# Patient Record
Sex: MALE | Race: WHITE | NOT HISPANIC OR LATINO | Employment: OTHER | URBAN - METROPOLITAN AREA
[De-identification: names, ages, dates, MRNs, and addresses within clinical notes are randomized per-mention and may not be internally consistent; named-entity substitution may affect disease eponyms.]

---

## 2023-08-30 ENCOUNTER — HOSPITAL ENCOUNTER (OUTPATIENT)
Dept: DATA CONVERSION | Facility: HOSPITAL | Age: 58
Discharge: HOME | End: 2023-08-30
Payer: MEDICARE

## 2023-08-30 DIAGNOSIS — E80.0: ICD-10-CM

## 2023-08-30 LAB
ALBUMIN SERPL-MCNC: 4.4 GM/DL (ref 3.5–5)
ALBUMIN/GLOB SERPL: 1.2 RATIO (ref 1.5–3)
ALP BLD-CCNC: 203 U/L (ref 35–125)
ALT SERPL-CCNC: 15 U/L (ref 5–40)
AST SERPL-CCNC: 23 U/L (ref 5–40)
BILIRUB DIRECT SERPL-MCNC: 0.5 MG/DL (ref 0–0.2)
BILIRUB INDIRECT SERPL-MCNC: 0.9 MG/DL (ref 0–0.8)
BILIRUB SERPL-MCNC: 1.4 MG/DL (ref 0.1–1.2)
GLOBULIN SER-MCNC: 3.7 G/DL (ref 1.9–3.7)
PROT SERPL-MCNC: 8.1 G/DL (ref 5.9–7.9)

## 2023-09-22 ENCOUNTER — HOSPITAL ENCOUNTER (OUTPATIENT)
Dept: DATA CONVERSION | Facility: HOSPITAL | Age: 58
Discharge: HOME | End: 2023-09-22
Payer: MEDICARE

## 2023-09-22 DIAGNOSIS — R17 UNSPECIFIED JAUNDICE: ICD-10-CM

## 2023-09-22 DIAGNOSIS — R74.8 ABNORMAL LEVELS OF OTHER SERUM ENZYMES: ICD-10-CM

## 2023-10-10 ENCOUNTER — LAB (OUTPATIENT)
Dept: LAB | Facility: LAB | Age: 58
End: 2023-10-10
Payer: MEDICARE

## 2023-10-10 DIAGNOSIS — R17 UNSPECIFIED JAUNDICE: ICD-10-CM

## 2023-10-10 DIAGNOSIS — R79.89 OTHER SPECIFIED ABNORMAL FINDINGS OF BLOOD CHEMISTRY: Primary | ICD-10-CM

## 2023-10-10 LAB
HGB RETIC QN: 34 PG (ref 28–38)
IMMATURE RETIC FRACTION: 11.1 %
LDH SERPL-CCNC: 247 U/L (ref 91–227)
RETICS #: 0.09 X10*6/UL (ref 0.02–0.12)
RETICS/RBC NFR AUTO: 2 % (ref 0.5–2)

## 2023-10-10 PROCEDURE — 82105 ALPHA-FETOPROTEIN SERUM: CPT | Mod: WAIVER OF LIABILITY ON FILE

## 2023-10-10 PROCEDURE — 36415 COLL VENOUS BLD VENIPUNCTURE: CPT

## 2023-10-10 PROCEDURE — 86235 NUCLEAR ANTIGEN ANTIBODY: CPT

## 2023-10-10 PROCEDURE — 86706 HEP B SURFACE ANTIBODY: CPT

## 2023-10-10 PROCEDURE — 80074 ACUTE HEPATITIS PANEL: CPT | Mod: WAIVER OF LIABILITY ON FILE

## 2023-10-10 PROCEDURE — 86381 MITOCHONDRIAL ANTIBODY EACH: CPT

## 2023-10-10 PROCEDURE — 82390 ASSAY OF CERULOPLASMIN: CPT

## 2023-10-10 PROCEDURE — 86225 DNA ANTIBODY NATIVE: CPT

## 2023-10-10 PROCEDURE — 82103 ALPHA-1-ANTITRYPSIN TOTAL: CPT

## 2023-10-10 PROCEDURE — 83010 ASSAY OF HAPTOGLOBIN QUANT: CPT

## 2023-10-10 PROCEDURE — 86038 ANTINUCLEAR ANTIBODIES: CPT

## 2023-10-10 PROCEDURE — 86015 ACTIN ANTIBODY EACH: CPT

## 2023-10-10 PROCEDURE — 83615 LACTATE (LD) (LDH) ENZYME: CPT

## 2023-10-10 PROCEDURE — 85045 AUTOMATED RETICULOCYTE COUNT: CPT

## 2023-10-11 LAB
A1AT SERPL NEPH-MCNC: 212 MG/DL (ref 84–218)
AFP SERPL-MCNC: <4 NG/ML (ref 0–9)
ANA PATTERN: ABNORMAL
ANA SER QL HEP2 SUBST: POSITIVE
ANA TITR SER IF: ABNORMAL {TITER}
CENTROMERE B AB SER-ACNC: <0.2 AI
CERULOPLASMIN SERPL-MCNC: 51.6 MG/DL (ref 20–60)
CHROMATIN AB SERPL-ACNC: <0.2 AI
DSDNA AB SER-ACNC: <1 IU/ML
ENA JO1 AB SER QL IA: <0.2 AI
ENA RNP AB SER IA-ACNC: <0.2 AI
ENA SCL70 AB SER QL IA: <0.2 AI
ENA SM AB SER IA-ACNC: <0.2 AI
ENA SM+RNP AB SER QL IA: <0.2 AI
ENA SS-A AB SER IA-ACNC: <0.2 AI
ENA SS-B AB SER IA-ACNC: <0.2 AI
HAPTOGLOB SERPL NEPH-MCNC: 210 MG/DL (ref 30–200)
HAV IGM SER QL: NONREACTIVE
HBV CORE IGM SER QL: NONREACTIVE
HBV SURFACE AB SER-ACNC: 20.8 MIU/ML
HBV SURFACE AG SERPL QL IA: NONREACTIVE
HCV AB SER QL: NONREACTIVE
MITOCHONDRIA AB SER QL IF: NEGATIVE
RIBOSOMAL P AB SER-ACNC: 0.2 AI
SMOOTH MUSCLE AB SER QL IF: NEGATIVE

## 2023-10-12 DIAGNOSIS — Z76.0 MEDICATION REFILL: ICD-10-CM

## 2023-10-12 RX ORDER — PAROXETINE 10 MG/1
10 TABLET, FILM COATED ORAL
Qty: 90 TABLET | Refills: 3 | Status: SHIPPED | OUTPATIENT
Start: 2023-10-12

## 2023-10-13 ENCOUNTER — HOSPITAL ENCOUNTER (OUTPATIENT)
Dept: RADIOLOGY | Facility: EXTERNAL LOCATION | Age: 58
Discharge: HOME | End: 2023-10-13
Payer: MEDICARE

## 2023-10-13 DIAGNOSIS — M25.522 LEFT ELBOW PAIN: ICD-10-CM

## 2023-10-18 ENCOUNTER — TELEPHONE (OUTPATIENT)
Dept: PRIMARY CARE | Facility: CLINIC | Age: 58
End: 2023-10-18
Payer: MEDICARE

## 2023-10-18 NOTE — TELEPHONE ENCOUNTER
It looks like pt has positive Hep B antibodies. This could be from Hep B vaccination or past exposure to Hep B.

## 2023-10-18 NOTE — TELEPHONE ENCOUNTER
Pt's wife left message stating that they are concerned about recent lab results from the GI doctor the pt was referred to. Pt's wife stated that they were told the pt tested positive for hep B. Pt's wife stated they have called the GI office multiple times but cannot get a hold of anyone. Pt wife also stated that it looks like it says the pt has hep B antibodies from the MyChart results. The pt wife is requesting clarification from our office if we have access to these results.

## 2023-10-20 ENCOUNTER — LAB (OUTPATIENT)
Dept: LAB | Facility: LAB | Age: 58
End: 2023-10-20
Payer: MEDICARE

## 2023-10-20 DIAGNOSIS — R79.89 OTHER SPECIFIED ABNORMAL FINDINGS OF BLOOD CHEMISTRY: Primary | ICD-10-CM

## 2023-10-20 LAB — GGT SERPL-CCNC: 188 U/L (ref 15–85)

## 2023-10-20 PROCEDURE — 82977 ASSAY OF GGT: CPT | Mod: WAIVER OF LIABILITY ON FILE

## 2023-10-20 PROCEDURE — 86803 HEPATITIS C AB TEST: CPT

## 2023-10-20 PROCEDURE — 84080 ASSAY ALKALINE PHOSPHATASES: CPT

## 2023-10-20 PROCEDURE — 84078 ASSAY ALKALINE PHOSPHATASE: CPT

## 2023-10-20 PROCEDURE — 36415 COLL VENOUS BLD VENIPUNCTURE: CPT

## 2023-10-21 LAB — HCV AB SER QL: NONREACTIVE

## 2023-10-23 LAB — ALP BONE SERPL-MCNC: 15.4 UG/L (ref 6.5–20.1)

## 2023-10-24 LAB
ALP BONE SERPL-CCNC: 40 U/L (ref 0–55)
ALP LIVER SERPL-CCNC: 168 U/L (ref 0–94)
ALP OTHER SERPL-CCNC: 0 U/L
ALP SERPL-CCNC: 208 U/L (ref 40–120)

## 2023-10-30 ENCOUNTER — TELEPHONE (OUTPATIENT)
Dept: GASTROENTEROLOGY | Facility: HOSPITAL | Age: 58
End: 2023-10-30

## 2023-10-30 DIAGNOSIS — K76.0 NON-ALCOHOLIC FATTY LIVER DISEASE: Primary | ICD-10-CM

## 2023-10-31 ENCOUNTER — TELEPHONE (OUTPATIENT)
Dept: PRIMARY CARE | Facility: CLINIC | Age: 58
End: 2023-10-31
Payer: MEDICARE

## 2023-10-31 DIAGNOSIS — M70.30 BURSITIS OF ELBOW, UNSPECIFIED BURSA, UNSPECIFIED LATERALITY: Primary | ICD-10-CM

## 2023-11-01 NOTE — TELEPHONE ENCOUNTER
Pt wife was callled and states she wants to know what you think she should do because uc said to drain and ortho said no. Would you like a uc follow up?

## 2023-11-01 NOTE — TELEPHONE ENCOUNTER
ORTHO would be the experts in this, not urgent care. If she would like a second opinion from a different ORTHO specialist, I can place the referral.

## 2023-11-01 NOTE — TELEPHONE ENCOUNTER
Spoke with wife - he went to  yesterday and afterwards was able to be see by Dr. Christie Mcmillan at [facility] ortho in [place]. Stated they will not drain them due to the risk of infection. Wife stated it was already previously infected and treated with abx. She wanted to know if you knew any ortho that would drain it for him? If so, they need a referral.

## 2023-11-01 NOTE — TELEPHONE ENCOUNTER
Spoke with patients wife. She stated patient went to  and was told he has bursitis of the elbow. Wife stated it is a sac that needs to be drained (can feel the fluid), but they cannot find anyone to drain it. They find places that say they will then they get there and tell them they dont do that. They want dr aguirre opinion on if you know somewhere they can go or if it just needs to be left alone?

## 2023-11-02 NOTE — TELEPHONE ENCOUNTER
Patients wife made aware. Stated they will hold off on the referral for now and will call back if they change their mind.

## 2023-11-20 ENCOUNTER — APPOINTMENT (OUTPATIENT)
Dept: PRIMARY CARE | Facility: CLINIC | Age: 58
End: 2023-11-20
Payer: MEDICARE

## 2023-11-25 ENCOUNTER — HOSPITAL ENCOUNTER (OUTPATIENT)
Dept: RADIOLOGY | Facility: EXTERNAL LOCATION | Age: 58
Discharge: HOME | End: 2023-11-25

## 2023-11-25 DIAGNOSIS — R05.1 ACUTE COUGH: ICD-10-CM

## 2023-12-25 NOTE — PROGRESS NOTES
"Hepatology Clinic Consult Note    Reason For Consult  Elevated alk phos, hyperbilirubinemia, nodular liver on US    History Of Present Illness  Carl Vazquez is a 58 y.o. male with a past medical history of CAD s/p quadruple bypass 2014 and PCI x2 2020, hypothyroidism, Hodgkin's diease (age 16) s/p chemo/radiation s/p splenectomy, bioprosthetic aortic valve replacement 2014, on chronic asa/plavix, DM, and HLD who presents for follow up of Elevated alk phos, hyperbilirubinemia, nodular liver on US    Has known he has an elevated alk phos for the last 20-25 years. Bilirubin is newly elevated this year. Has not noticed any yellowing of skin or eyes.    Saw a GI provider 3-4 times in the office at [facility] (Khoi Sosa). Got a fibroscan and was told there is quite a bit of liver disease in the liver, though he is not sure if it is \"fatty liver\" are \"scarring in the liver\". I am unable to see this report.     He has a long history of cardiac disease and follows with a cardiologist in [facility]. He had a quadruple bypass in 2014 then required PCI x2 in 2020. He had an aortic valve replacement (bioprosthetic) in 2014 as well. Last Echo reports EF 50-55% and moderate pulmonary hypertension with well-functioning bioprosthetic valve. Is on torsemide for leg swelling which he was told is due to his heart. Has been on this since 2014. Fluid retention usually happens in left leg for the last 10 years.    Regarding his Hodgkin's lymphoma dx at age 16, he did get radiation from the waist up. Got about 20 sessions in the chest, 30 sessions in the abdomen.    Used to be 210 now 175 lbs. Has made some dietary changes and is more active. Reports has been fairly stable at 175. May have had some additional weight loss in the last 6 months    Social History:  -Drugs: none  -Alcohol - social drinker on weekends 4 beers and Wednesdays 2 beers 12oz.  -Tobacco: denies    Surgery:  Thoracotomy with decortication 2018  LN neck " biopsy  Splenectomy  Tumor debulking in the abdomen  Tonsillectomy       Past Medical History  CAD s/p quadruple bypass 2014 and PCI x2 2020, hypothyroidism, Hodgkin's diease (age 16) s/p chemo/radiation s/p splenectomy, bioprosthetic aortic valve replacement 2014, on chronic asa/plavix, DM, and HLD    Surgical History  Past Surgical History:   Procedure Laterality Date    CT GUIDED CHEST TUBE PLACEMENT  7/25/2018    CT GUIDED CHEST TUBE PLACEMENT [facility]     Family History  No family history of liver disease in the family. No family history of colon cancer. Has an identical twin brother who is healthy       Allergies  Allergies   Allergen Reactions    Phenobarbital Rash       Home Medications    Current Outpatient Medications:     metFORMIN, OSM, (Fortamet) 500 mg 24 hr tablet, Take 1 tablet (500 mg) by mouth once daily in the evening. Take with meals. Do not crush, chew, or split., Disp: , Rfl:     PARoxetine (Paxil) 10 mg tablet, TAKE 1 TABLET DAILY IN THE MORNING (Patient taking differently: Take 0.5 tablets (5 mg) by mouth once daily in the morning. Take before meals.), Disp: 90 tablet, Rfl: 3    aspirin 81 mg EC tablet, Take 1 tablet (81 mg) by mouth once daily., Disp: , Rfl:     atorvastatin (Lipitor) 20 mg tablet, Take 1 tablet (20 mg) by mouth once daily., Disp: , Rfl:     clopidogrel (Plavix) 75 mg tablet, Take 1 tablet (75 mg) by mouth once daily., Disp: , Rfl:     levothyroxine (Synthroid, Levoxyl) 25 mcg tablet, Take 1 tablet (25 mcg) by mouth once daily in the morning. Take before meals., Disp: , Rfl:     lisinopril 5 mg tablet, Take 1 tablet (5 mg) by mouth once daily., Disp: , Rfl:     metoprolol succinate XL (Toprol-XL) 25 mg 24 hr tablet, Take 1 tablet (25 mg) by mouth 2 times a day. Do not crush or chew., Disp: , Rfl:     pantoprazole (ProtoNix) 40 mg EC tablet, Take 1 tablet (40 mg) by mouth once daily in the morning. Take before meals. Do not crush, chew, or split., Disp: , Rfl:      torsemide (Demadex) 100 mg tablet, Take 1 tablet (100 mg) by mouth once daily., Disp: , Rfl:     TORSEMIDE ORAL, Take 50 mg by mouth once daily., Disp: , Rfl:     Review of Systems  Constitutional/ General: No fever, + weight loss  Eyes: no yellow discoloration  ENT: normal   Cardiovascular: no chest pain, no palpitations  Respiratory: no shortness of breath  Gastrointestinal: denies abdominal pain, nausea, vomiting  Integumentary: no rashes, no yellow discoloration of skin  Neurologic: no weakness or numbness of extremities  Psychiatric: no mood fluctuations  Musculoskeletal: no joint swelling   Genitourinary: no dysuria, no hematuria    All other systems have been reviewed and are negative except as noted in the HPI and above.      Physical Exam  General: well-nourished, no acute distress  HEENT: PERRLA, EOM intact, no scleral icterus, moist MM  Respiratory: CTA bilaterally, normal work of breathing  Cardiovascular: RRR, no murmurs/rubs/gallops  Abdomen: Soft, nontender, nondistended, bowel sounds present, no masses palpated, no organomeagly  Extremities: no edema, no asterixis  Neuro: alert and oriented, CNII-XII grossly intact, moves all 4 extremities with no focal deficits     Last Recorded Vitals  Blood pressure 129/78, pulse 73, temperature 36.4 °C (97.6 °F).      Relevant Results    Current Outpatient Medications:     metFORMIN, OSM, (Fortamet) 500 mg 24 hr tablet, Take 1 tablet (500 mg) by mouth once daily in the evening. Take with meals. Do not crush, chew, or split., Disp: , Rfl:     PARoxetine (Paxil) 10 mg tablet, TAKE 1 TABLET DAILY IN THE MORNING (Patient taking differently: Take 0.5 tablets (5 mg) by mouth once daily in the morning. Take before meals.), Disp: 90 tablet, Rfl: 3    aspirin 81 mg EC tablet, Take 1 tablet (81 mg) by mouth once daily., Disp: , Rfl:     atorvastatin (Lipitor) 20 mg tablet, Take 1 tablet (20 mg) by mouth once daily., Disp: , Rfl:     clopidogrel (Plavix) 75 mg tablet, Take  1 tablet (75 mg) by mouth once daily., Disp: , Rfl:     levothyroxine (Synthroid, Levoxyl) 25 mcg tablet, Take 1 tablet (25 mcg) by mouth once daily in the morning. Take before meals., Disp: , Rfl:     lisinopril 5 mg tablet, Take 1 tablet (5 mg) by mouth once daily., Disp: , Rfl:     metoprolol succinate XL (Toprol-XL) 25 mg 24 hr tablet, Take 1 tablet (25 mg) by mouth 2 times a day. Do not crush or chew., Disp: , Rfl:     pantoprazole (ProtoNix) 40 mg EC tablet, Take 1 tablet (40 mg) by mouth once daily in the morning. Take before meals. Do not crush, chew, or split., Disp: , Rfl:     torsemide (Demadex) 100 mg tablet, Take 1 tablet (100 mg) by mouth once daily., Disp: , Rfl:     TORSEMIDE ORAL, Take 50 mg by mouth once daily., Disp: , Rfl:      Lab Results   Component Value Date    WBC 8.4 2023    HGB 14.0 2023    HCT 42.5 2023    MCV 95.9 2023     2023     Lab Results   Component Value Date    GLUCOSE 100 (H) 2023    CALCIUM 9.7 2023     2023    K 4.4 2023    CO2 30 2023    CL 96 (L) 2023    BUN 28 (H) 2023    CREATININE 1.2 2023     Lab Results   Component Value Date    ALT 15 2023    AST 23 2023     (H) 10/20/2023    ALKPHOS 208 (H) 10/20/2023    BILITOT 1.4 (H) 2023     AFP <4  HBV surface Ag nonreactive  HAV IgM nonreactive  HBV core IgM nonreactive  HBV surface Ab 20.8  HCV nonreactive  Ceruloplasmin 51.6  AMANDA 1:320, homogeneous  AMA negative  A1AT 212  ASMA negative  NICK panel negative    Imagin/2023 US abdomen  IMPRESSION:  Slight nonspecific lobulation of the hepatic contour, without focal lesions;  correlate clinically to exclude cirrhosis. Right renal cyst. No gallstones  or biliary duct dilatation.    2018 CT AP  IMPRESSION: Massive diffuse small bowel wall thickening involving all or  most of the ileum, worse compared to 7/15/2018. Large amount of ascites  present is also mildly  worse. The findings are of uncertain etiology and  again suggest a nonspecific enteritis which may be inflammatory or  infectious in nature. Ischemia is not excluded on the basis of this exam.     Continued large right pleural effusion with prominent parenchymal density at  the right base similar to prior study. This may relate to aspiration  pneumonia versus partial lung collapse. Neoplasm not excluded.       GI Procedures:    7/2018 EGD  Findings:       The esophagus and gastroesophageal junction were examined with white        light from a forward view and retroflexed position. There were        esophageal mucosal changes suspicious for short-segment Barboza's        esophagus. These changes involved the mucosa at the upper extent of the        gastric folds (39 cm from the incisors) extending to the Z-line (37 cm        from the incisors). Two tongues of salmon-colored mucosa were present at        37 cm. The maximum longitudinal extent of these esophageal mucosal        changes was 2 cm in length. Mucosa was biopsied with a cold forceps for        histology in a targeted manner at intervals of 1 cm in the lower third        of the esophagus. One specimen bottle was sent to pathology.        Verification of patient identification for the specimen was done by the        nurse.       Food was found on the greater curvature of the stomach.       The ampulla, duodenal bulb, first portion of the duodenum and second        portion of the duodenum were normal.  Impression:       - Esophageal mucosal changes suspicious for short-segment Barboza's        esophagus. Biopsied.       - Food was found in the stomach.    Pathology: focal inflammation, no Barboza's    7/2018 Colonoscopy  Findings:       The perianal and digital rectal examinations were normal.       Non-bleeding rectal varices were found.       Non-bleeding left-sided colonic varices were found.       The terminal ileum appeared normal. Biopsies were taken  with a cold        forceps for histology. Estimated blood loss was minimal.       The mucosa vascular pattern in the proximal ascending colon was        segmentally decreased. This was biopsied with a cold forceps for        histology. Estimated blood loss was minimal.       The transverse colon, hepatic flexure, mid ascending colon, distal        ascending colon, cecum, appendiceal orifice and ileocecal valve appeared        normal.  Impression:       - Rectal varices.       - Left-sided colonic varices.       - The examined portion of the ileum was normal. Biopsied.       - Decreased mucosa vascular pattern in the proximal ascending colon.        Biopsied.       - The transverse colon, hepatic flexure, mid ascending colon, distal        ascending colon, cecum, appendiceal orifice and ileocecal valve are        normal.    Pathology: TI and colonic biopsies normal mucosa     ASSESSMENT/PLAN:    Carl Vazquez is a 58 y.o. male with a past medical history of CAD s/p quadruple bypass 2014 and PCI x2 2020, hypothyroidism, Hodgkin's diease (age 16) s/p chemo/radiation s/p splenectomy, bioprosthetic aortic valve replacement 2014, on chronic asa/plavix, DM, and HLD who presents for follow up of Elevated alk phos, hyperbilirubinemia, nodular liver on US    Differential for his elevated alk phos and mild hyperbilirubinemia remains broad at this time and includes cirrhosis, chronic venous insufficiency/stenosis, PSC, PBC (though less likely), infiltrative disease, less likely infectious etiologies. Consider cardiac cirrhosis/hepatic congestion as well given long standing cardiac disease. He has a long standing history of elevated alk phos witch recent new mild hyperbilirubinemia. US liver 9/2023 showed slight nodularity of the liver for the first time. He had fibroscan at [facility] in 10/2023, but results of this are not available to view    Following labs were reviewed and noted from 10/2023  AFP <4  HBV surface Ag  nonreactive  HAV IgM nonreactive  HBV core IgM nonreactive  HBV surface Ab 20.8  HCV nonreactive  Ceruloplasmin 51.6  AMANDA 1:320, homogeneous  AMA negative  A1AT 212  ASMA negative  NICK panel negative    Plan:  -Obtain MRI/MRCP to assess for possible PSC and patency of vasculature given history of abdominal radiation  -Will attempt to obtain records from outside cardiologist  -Will attempt to obtain outside Fibroscan results  -Check MELD labs  -Finish chronic liver disease work up: ferritin/iron panel, immunoglobulins, TTG IgA.    Patient was seen and discussed with Dr. Alberto Reed MD  PGY4 Gastroenterology Fellow  [facility]

## 2023-12-26 ENCOUNTER — OFFICE VISIT (OUTPATIENT)
Dept: GASTROENTEROLOGY | Facility: CLINIC | Age: 58
End: 2023-12-26
Payer: MEDICARE

## 2023-12-26 VITALS — TEMPERATURE: 97.6 F | SYSTOLIC BLOOD PRESSURE: 129 MMHG | HEART RATE: 73 BPM | DIASTOLIC BLOOD PRESSURE: 78 MMHG

## 2023-12-26 DIAGNOSIS — R94.5 ABNORMAL RESULTS OF LIVER FUNCTION STUDIES: ICD-10-CM

## 2023-12-26 DIAGNOSIS — R74.8 ELEVATED ALKALINE PHOSPHATASE LEVEL: ICD-10-CM

## 2023-12-26 DIAGNOSIS — R93.2 ABNORMAL LIVER DIAGNOSTIC IMAGING: Primary | ICD-10-CM

## 2023-12-26 PROCEDURE — 1036F TOBACCO NON-USER: CPT | Performed by: STUDENT IN AN ORGANIZED HEALTH CARE EDUCATION/TRAINING PROGRAM

## 2023-12-26 PROCEDURE — 99214 OFFICE O/P EST MOD 30 MIN: CPT | Performed by: STUDENT IN AN ORGANIZED HEALTH CARE EDUCATION/TRAINING PROGRAM

## 2023-12-26 RX ORDER — METOPROLOL SUCCINATE 25 MG/1
25 TABLET, EXTENDED RELEASE ORAL 2 TIMES DAILY
COMMUNITY
End: 2024-01-22 | Stop reason: WASHOUT

## 2023-12-26 RX ORDER — ATORVASTATIN CALCIUM 20 MG/1
20 TABLET, FILM COATED ORAL DAILY
COMMUNITY
End: 2024-01-22 | Stop reason: WASHOUT

## 2023-12-26 RX ORDER — LEVOTHYROXINE SODIUM 25 UG/1
25 TABLET ORAL
COMMUNITY

## 2023-12-26 RX ORDER — ASPIRIN 81 MG/1
81 TABLET ORAL DAILY
COMMUNITY

## 2023-12-26 RX ORDER — LISINOPRIL 5 MG/1
5 TABLET ORAL DAILY
COMMUNITY

## 2023-12-26 RX ORDER — PANTOPRAZOLE SODIUM 40 MG/1
40 TABLET, DELAYED RELEASE ORAL
COMMUNITY
End: 2024-02-01

## 2023-12-26 RX ORDER — TORSEMIDE 100 MG/1
100 TABLET ORAL DAILY
COMMUNITY

## 2023-12-26 RX ORDER — CLOPIDOGREL BISULFATE 75 MG/1
75 TABLET ORAL DAILY
COMMUNITY
End: 2024-06-06

## 2023-12-26 RX ORDER — METFORMIN HYDROCHLORIDE EXTENDED-RELEASE TABLETS 500 MG/1
500 TABLET, FILM COATED, EXTENDED RELEASE ORAL
COMMUNITY

## 2023-12-26 NOTE — PATIENT INSTRUCTIONS
Carl Vazquez,     Thank you for coming in for your hepatology office visit today. As per our discussion, I recommend:     Have labs done for check of the liver.   Have an MRCP MRI of the liver, call [phone] to schedule this test.     I would like to see you back in the office in 4 months.   If you are not provided with a date for your follow up visit at the end of your encounter today at the check out desk, I would encourage you to call [phone] to schedule your appointment with me.   If you have any trouble or need assistance with having this done, please reach out to my 's office at [phone] (Ms Pricila Lai) or my nurse coordinator at [phone] (Ms Angelica MEDINA).     I look forward to seeing you again. Thank you for allowing me to participate in your care.     Sincerely,  Salas Dominguez MD  Hepatology

## 2023-12-27 PROBLEM — R74.8 ELEVATED ALKALINE PHOSPHATASE LEVEL: Status: ACTIVE | Noted: 2023-12-27

## 2023-12-27 PROBLEM — R93.2 ABNORMAL LIVER DIAGNOSTIC IMAGING: Status: ACTIVE | Noted: 2023-12-27

## 2024-01-04 ENCOUNTER — LAB (OUTPATIENT)
Dept: LAB | Facility: LAB | Age: 59
End: 2024-01-04
Payer: MEDICARE

## 2024-01-04 DIAGNOSIS — R94.5 ABNORMAL RESULTS OF LIVER FUNCTION STUDIES: ICD-10-CM

## 2024-01-04 DIAGNOSIS — R93.2 ABNORMAL LIVER DIAGNOSTIC IMAGING: ICD-10-CM

## 2024-01-04 DIAGNOSIS — R74.8 ELEVATED ALKALINE PHOSPHATASE LEVEL: ICD-10-CM

## 2024-01-04 LAB
ALBUMIN SERPL BCP-MCNC: 4.4 G/DL (ref 3.4–5)
ALP SERPL-CCNC: 135 U/L (ref 33–120)
ALT SERPL W P-5'-P-CCNC: 14 U/L (ref 10–52)
ANION GAP SERPL CALC-SCNC: 14 MMOL/L (ref 10–20)
AST SERPL W P-5'-P-CCNC: 17 U/L (ref 9–39)
BASOPHILS # BLD AUTO: 0.13 X10*3/UL (ref 0–0.1)
BASOPHILS NFR BLD AUTO: 1.5 %
BILIRUB DIRECT SERPL-MCNC: 0.3 MG/DL (ref 0–0.3)
BILIRUB SERPL-MCNC: 1 MG/DL (ref 0–1.2)
BUN SERPL-MCNC: 25 MG/DL (ref 6–23)
CALCIUM SERPL-MCNC: 9.6 MG/DL (ref 8.6–10.6)
CHLORIDE SERPL-SCNC: 99 MMOL/L (ref 98–107)
CO2 SERPL-SCNC: 28 MMOL/L (ref 21–32)
CREAT SERPL-MCNC: 1.21 MG/DL (ref 0.5–1.3)
EOSINOPHIL # BLD AUTO: 0.24 X10*3/UL (ref 0–0.7)
EOSINOPHIL NFR BLD AUTO: 2.8 %
ERYTHROCYTE [DISTWIDTH] IN BLOOD BY AUTOMATED COUNT: 15.3 % (ref 11.5–14.5)
FERRITIN SERPL-MCNC: 155 NG/ML (ref 20–300)
GFR SERPL CREATININE-BSD FRML MDRD: 69 ML/MIN/1.73M*2
GLUCOSE SERPL-MCNC: 122 MG/DL (ref 74–99)
HCT VFR BLD AUTO: 39.9 % (ref 41–52)
HGB BLD-MCNC: 13.5 G/DL (ref 13.5–17.5)
IMM GRANULOCYTES # BLD AUTO: 0.03 X10*3/UL (ref 0–0.7)
IMM GRANULOCYTES NFR BLD AUTO: 0.3 % (ref 0–0.9)
INR PPP: 1 (ref 0.9–1.1)
IRON SATN MFR SERPL: 23 % (ref 25–45)
IRON SERPL-MCNC: 84 UG/DL (ref 35–150)
LYMPHOCYTES # BLD AUTO: 2.25 X10*3/UL (ref 1.2–4.8)
LYMPHOCYTES NFR BLD AUTO: 25.8 %
MCH RBC QN AUTO: 32.8 PG (ref 26–34)
MCHC RBC AUTO-ENTMCNC: 33.8 G/DL (ref 32–36)
MCV RBC AUTO: 97 FL (ref 80–100)
MONOCYTES # BLD AUTO: 1.71 X10*3/UL (ref 0.1–1)
MONOCYTES NFR BLD AUTO: 19.6 %
NEUTROPHILS # BLD AUTO: 4.35 X10*3/UL (ref 1.2–7.7)
NEUTROPHILS NFR BLD AUTO: 50 %
NRBC BLD-RTO: 0 /100 WBCS (ref 0–0)
PLATELET # BLD AUTO: 260 X10*3/UL (ref 150–450)
POTASSIUM SERPL-SCNC: 4.3 MMOL/L (ref 3.5–5.3)
PROT SERPL-MCNC: 7.2 G/DL (ref 6.4–8.2)
PROTHROMBIN TIME: 11.5 SECONDS (ref 9.8–12.8)
RBC # BLD AUTO: 4.11 X10*6/UL (ref 4.5–5.9)
SODIUM SERPL-SCNC: 137 MMOL/L (ref 136–145)
TIBC SERPL-MCNC: 372 UG/DL (ref 240–445)
TTG IGA SER IA-ACNC: <1 U/ML
UIBC SERPL-MCNC: 288 UG/DL (ref 110–370)
WBC # BLD AUTO: 8.7 X10*3/UL (ref 4.4–11.3)

## 2024-01-04 PROCEDURE — 80053 COMPREHEN METABOLIC PANEL: CPT

## 2024-01-04 PROCEDURE — 83540 ASSAY OF IRON: CPT

## 2024-01-04 PROCEDURE — 85025 COMPLETE CBC W/AUTO DIFF WBC: CPT

## 2024-01-04 PROCEDURE — 83516 IMMUNOASSAY NONANTIBODY: CPT

## 2024-01-04 PROCEDURE — 82248 BILIRUBIN DIRECT: CPT

## 2024-01-04 PROCEDURE — 83550 IRON BINDING TEST: CPT

## 2024-01-04 PROCEDURE — 85610 PROTHROMBIN TIME: CPT

## 2024-01-04 PROCEDURE — 82104 ALPHA-1-ANTITRYPSIN PHENO: CPT

## 2024-01-04 PROCEDURE — 82784 ASSAY IGA/IGD/IGG/IGM EACH: CPT

## 2024-01-04 PROCEDURE — 36415 COLL VENOUS BLD VENIPUNCTURE: CPT

## 2024-01-04 PROCEDURE — 82728 ASSAY OF FERRITIN: CPT

## 2024-01-05 LAB
IGA SERPL-MCNC: 245 MG/DL (ref 70–400)
IGG SERPL-MCNC: 1400 MG/DL (ref 700–1600)
IGM SERPL-MCNC: 79 MG/DL (ref 40–230)

## 2024-01-08 LAB
A1AT PHENOTYP SERPL-IMP: NORMAL
A1AT SERPL-MCNC: 193 MG/DL (ref 90–200)

## 2024-01-09 ENCOUNTER — HOSPITAL ENCOUNTER (OUTPATIENT)
Dept: RADIOLOGY | Facility: HOSPITAL | Age: 59
Discharge: HOME | End: 2024-01-09
Payer: COMMERCIAL

## 2024-01-09 VITALS — BODY MASS INDEX: 25.48 KG/M2 | HEIGHT: 70 IN | WEIGHT: 178 LBS

## 2024-01-09 DIAGNOSIS — R74.8 ELEVATED ALKALINE PHOSPHATASE LEVEL: ICD-10-CM

## 2024-01-09 DIAGNOSIS — R93.2 ABNORMAL LIVER DIAGNOSTIC IMAGING: ICD-10-CM

## 2024-01-09 PROCEDURE — A9575 INJ GADOTERATE MEGLUMI 0.1ML: HCPCS | Performed by: STUDENT IN AN ORGANIZED HEALTH CARE EDUCATION/TRAINING PROGRAM

## 2024-01-09 PROCEDURE — 74183 MRI ABD W/O CNTR FLWD CNTR: CPT | Performed by: RADIOLOGY

## 2024-01-09 PROCEDURE — 74183 MRI ABD W/O CNTR FLWD CNTR: CPT

## 2024-01-09 PROCEDURE — 2550000001 HC RX 255 CONTRASTS: Performed by: STUDENT IN AN ORGANIZED HEALTH CARE EDUCATION/TRAINING PROGRAM

## 2024-01-09 RX ORDER — GADOTERATE MEGLUMINE 376.9 MG/ML
15 INJECTION INTRAVENOUS
Status: COMPLETED | OUTPATIENT
Start: 2024-01-09 | End: 2024-01-09

## 2024-01-09 RX ADMIN — GADOTERATE MEGLUMINE 15 ML: 376.9 INJECTION INTRAVENOUS at 14:29

## 2024-01-11 ENCOUNTER — TELEPHONE (OUTPATIENT)
Dept: GASTROENTEROLOGY | Facility: HOSPITAL | Age: 59
End: 2024-01-11
Payer: MEDICARE

## 2024-01-18 DIAGNOSIS — R74.8 ELEVATED ALKALINE PHOSPHATASE LEVEL: ICD-10-CM

## 2024-01-18 DIAGNOSIS — K76.0 NON-ALCOHOLIC FATTY LIVER DISEASE: ICD-10-CM

## 2024-01-22 PROBLEM — F41.9 ANXIETY: Status: ACTIVE | Noted: 2024-01-22

## 2024-01-22 PROBLEM — J90 PLEURAL EFFUSION: Status: ACTIVE | Noted: 2024-01-22

## 2024-01-22 PROBLEM — I27.20 PULMONARY HYPERTENSION (MULTI): Status: ACTIVE | Noted: 2024-01-22

## 2024-01-22 PROBLEM — R10.9 ABDOMINAL PAIN: Status: ACTIVE | Noted: 2024-01-22

## 2024-01-22 PROBLEM — J18.9 PNEUMONIA: Status: ACTIVE | Noted: 2024-01-22

## 2024-01-22 PROBLEM — K52.9 ILEITIS: Status: ACTIVE | Noted: 2024-01-22

## 2024-01-22 PROBLEM — E04.9 GOITER: Status: ACTIVE | Noted: 2024-01-22

## 2024-01-22 PROBLEM — E11.9 TYPE 2 DIABETES MELLITUS WITHOUT COMPLICATION (MULTI): Status: ACTIVE | Noted: 2023-08-02

## 2024-01-22 PROBLEM — E80.6 HYPERBILIRUBINEMIA: Status: ACTIVE | Noted: 2024-01-22

## 2024-01-22 PROBLEM — R94.31 ELECTROCARDIOGRAM ABNORMAL: Status: ACTIVE | Noted: 2024-01-22

## 2024-01-22 PROBLEM — E01.0 THYROMEGALY: Status: ACTIVE | Noted: 2024-01-22

## 2024-01-22 PROBLEM — C81.90 HODGKIN LYMPHOMA (MULTI): Status: ACTIVE | Noted: 2024-01-22

## 2024-01-22 PROBLEM — E78.5 DYSLIPIDEMIA: Status: ACTIVE | Noted: 2024-01-22

## 2024-01-22 PROBLEM — E80.0: Status: ACTIVE | Noted: 2024-01-22

## 2024-01-22 PROBLEM — C44.91 BASAL CELL CARCINOMA: Status: ACTIVE | Noted: 2024-01-22

## 2024-01-22 PROBLEM — K21.9 GASTROESOPHAGEAL REFLUX DISEASE: Status: ACTIVE | Noted: 2024-01-22

## 2024-01-22 PROBLEM — L08.9 FINGER INFECTION: Status: ACTIVE | Noted: 2023-03-20

## 2024-01-22 PROBLEM — I51.9 HEART DISEASE: Status: ACTIVE | Noted: 2024-01-22

## 2024-01-22 PROBLEM — I10 ESSENTIAL HYPERTENSION: Status: ACTIVE | Noted: 2024-01-22

## 2024-01-22 PROBLEM — R07.9 CHEST PAIN: Status: ACTIVE | Noted: 2024-01-22

## 2024-01-22 PROBLEM — I50.9 HEART FAILURE (MULTI): Status: ACTIVE | Noted: 2024-01-22

## 2024-01-22 PROBLEM — I95.9 LOW BLOOD PRESSURE: Status: ACTIVE | Noted: 2024-01-22

## 2024-01-22 PROBLEM — R05.1 ACUTE COUGH: Status: ACTIVE | Noted: 2023-11-25

## 2024-01-22 PROBLEM — Z95.2 HX OF REPLACEMENT OF AORTIC VALVE: Status: ACTIVE | Noted: 2024-01-22

## 2024-01-22 PROBLEM — R73.09 BLOOD GLUCOSE ABNORMAL: Status: ACTIVE | Noted: 2024-01-22

## 2024-01-22 PROBLEM — K43.2 INCISIONAL HERNIA: Status: ACTIVE | Noted: 2024-01-22

## 2024-01-22 PROBLEM — G89.18 POSTOPERATIVE PAIN: Status: ACTIVE | Noted: 2024-01-22

## 2024-01-22 PROBLEM — I82.90 VENOUS THROMBOSIS: Status: ACTIVE | Noted: 2024-01-22

## 2024-01-22 PROBLEM — I50.22 CHRONIC SYSTOLIC HEART FAILURE (MULTI): Status: ACTIVE | Noted: 2024-01-22

## 2024-01-22 PROBLEM — R91.8 LUNG MASS: Status: ACTIVE | Noted: 2024-01-22

## 2024-01-22 PROBLEM — R17 JAUNDICE: Status: ACTIVE | Noted: 2023-10-10

## 2024-01-22 PROBLEM — R06.02 SHORTNESS OF BREATH: Status: ACTIVE | Noted: 2024-01-22

## 2024-01-22 PROBLEM — I25.10 CORONARY ARTERIOSCLEROSIS: Status: ACTIVE | Noted: 2024-01-22

## 2024-01-22 PROBLEM — M25.529 ELBOW PAIN: Status: ACTIVE | Noted: 2023-10-08

## 2024-01-22 RX ORDER — METOPROLOL TARTRATE 25 MG/1
25 TABLET, FILM COATED ORAL 2 TIMES DAILY
COMMUNITY
Start: 2023-04-27 | End: 2024-04-15

## 2024-01-22 RX ORDER — ATORVASTATIN CALCIUM 40 MG/1
40 TABLET, FILM COATED ORAL DAILY
COMMUNITY
End: 2024-01-23 | Stop reason: SDUPTHER

## 2024-01-22 RX ORDER — MELOXICAM 15 MG/1
15 TABLET ORAL
COMMUNITY
Start: 2023-08-17 | End: 2024-01-23 | Stop reason: ALTCHOICE

## 2024-01-22 RX ORDER — ACETAMINOPHEN AND CODEINE PHOSPHATE 300; 30 MG/1; MG/1
1 TABLET ORAL EVERY 6 HOURS PRN
COMMUNITY
Start: 2023-06-29 | End: 2024-01-23 | Stop reason: ALTCHOICE

## 2024-01-22 RX ORDER — SILDENAFIL 50 MG/1
50 TABLET, FILM COATED ORAL AS NEEDED
COMMUNITY
End: 2024-04-04 | Stop reason: SDUPTHER

## 2024-01-23 ENCOUNTER — OFFICE VISIT (OUTPATIENT)
Dept: CARDIOLOGY | Facility: CLINIC | Age: 59
End: 2024-01-23
Payer: MEDICARE

## 2024-01-23 ENCOUNTER — APPOINTMENT (OUTPATIENT)
Dept: CARDIOLOGY | Facility: CLINIC | Age: 59
End: 2024-01-23
Payer: MEDICARE

## 2024-01-23 VITALS
BODY MASS INDEX: 24.5 KG/M2 | SYSTOLIC BLOOD PRESSURE: 120 MMHG | DIASTOLIC BLOOD PRESSURE: 67 MMHG | HEIGHT: 71 IN | RESPIRATION RATE: 18 BRPM | OXYGEN SATURATION: 99 % | TEMPERATURE: 98.6 F | HEART RATE: 68 BPM | WEIGHT: 175 LBS

## 2024-01-23 DIAGNOSIS — I10 ESSENTIAL HYPERTENSION: ICD-10-CM

## 2024-01-23 DIAGNOSIS — R07.9 CHEST PAIN, UNSPECIFIED TYPE: ICD-10-CM

## 2024-01-23 DIAGNOSIS — I25.10 CORONARY ARTERIOSCLEROSIS: ICD-10-CM

## 2024-01-23 DIAGNOSIS — E78.5 DYSLIPIDEMIA: ICD-10-CM

## 2024-01-23 DIAGNOSIS — I35.9 AORTIC VALVE DISORDER: Primary | ICD-10-CM

## 2024-01-23 PROCEDURE — 4010F ACE/ARB THERAPY RXD/TAKEN: CPT | Performed by: INTERNAL MEDICINE

## 2024-01-23 PROCEDURE — 93000 ELECTROCARDIOGRAM COMPLETE: CPT | Performed by: INTERNAL MEDICINE

## 2024-01-23 PROCEDURE — 3078F DIAST BP <80 MM HG: CPT | Performed by: INTERNAL MEDICINE

## 2024-01-23 PROCEDURE — 99214 OFFICE O/P EST MOD 30 MIN: CPT | Performed by: INTERNAL MEDICINE

## 2024-01-23 PROCEDURE — 3074F SYST BP LT 130 MM HG: CPT | Performed by: INTERNAL MEDICINE

## 2024-01-23 PROCEDURE — 1036F TOBACCO NON-USER: CPT | Performed by: INTERNAL MEDICINE

## 2024-01-23 RX ORDER — ATORVASTATIN CALCIUM 80 MG/1
80 TABLET, FILM COATED ORAL DAILY
Qty: 90 TABLET | Refills: 3 | Status: SHIPPED | OUTPATIENT
Start: 2024-01-23 | End: 2025-01-22

## 2024-01-23 RX ORDER — BISMUTH SUBSALICYLATE 262 MG
1 TABLET,CHEWABLE ORAL DAILY
COMMUNITY

## 2024-01-23 ASSESSMENT — PATIENT HEALTH QUESTIONNAIRE - PHQ9
2. FEELING DOWN, DEPRESSED OR HOPELESS: NOT AT ALL
SUM OF ALL RESPONSES TO PHQ9 QUESTIONS 1 AND 2: 0
1. LITTLE INTEREST OR PLEASURE IN DOING THINGS: NOT AT ALL

## 2024-01-23 ASSESSMENT — PAIN SCALES - GENERAL: PAINLEVEL: 0-NO PAIN

## 2024-01-23 NOTE — PROGRESS NOTES
History of present illness:  58 year old male patient here today following up on hx of CAD/CHF and angina status post CABG 4V, patient had open right thoracotomy with pleurodesis for recurrent pleural effusion. Patient had hospitalization for NSTEMI status post cardiac catheterization on 06/29/2020, had PCI to RCA with 2 overlapped stents, RCA graft was down and still has LM and filing LCX severe disease, asymptomatic now and similar to cath six years ago. 2D Echo on 08/24/2020 was normal. Patient returns to my office for follow-up. Has been having no chest pain or shortness of breath. Physically active. Denies any cardiac symptoms.     Past Medical History:   Diagnosis Date    Aortic valve disorder 01/06/2015    Basal cell carcinoma 01/22/2024    Chest pain 01/22/2024    Chronic systolic heart failure (CMS/HCC) 01/22/2024    Coronary arteriosclerosis 01/22/2024    Essential hypertension 01/22/2024    Hodgkin lymphoma (CMS/HCC) 01/22/2024    Hx of replacement of aortic valve 01/22/2024    Mixed hyperlipidemia 02/23/2005    Pleural effusion 01/22/2024    Shortness of breath 01/22/2024    Type 2 diabetes mellitus without complication (CMS/HCC) 08/02/2023    Venous thrombosis 01/22/2024       Past Surgical History:   Procedure Laterality Date    CT GUIDED CHEST TUBE PLACEMENT  7/25/2018    CT GUIDED CHEST TUBE PLACEMENT [facility]       Allergies   Allergen Reactions    Phenobarbital Rash and Hives        reports that he has never smoked. He has never been exposed to tobacco smoke. He has never used smokeless tobacco. He reports current alcohol use of about 4.0 standard drinks of alcohol per week. He reports that he does not use drugs.    Family History   Problem Relation Name Age of Onset    No Known Problems Mother      No Known Problems Father         Patient's Medications   New Prescriptions    No medications on file   Previous Medications    ASPIRIN 81 MG EC TABLET    Take 1 tablet (81 mg) by mouth once  daily.    ATORVASTATIN (LIPITOR) 40 MG TABLET    Take 1 tablet (40 mg) by mouth once daily.    CLOPIDOGREL (PLAVIX) 75 MG TABLET    Take 1 tablet (75 mg) by mouth once daily.    LEVOTHYROXINE (SYNTHROID, LEVOXYL) 25 MCG TABLET    Take 1 tablet (25 mcg) by mouth once daily in the morning. Take before meals.    LISINOPRIL 5 MG TABLET    Take 1 tablet (5 mg) by mouth once daily.    METFORMIN, OSM, (FORTAMET) 500 MG 24 HR TABLET    Take 1 tablet (500 mg) by mouth once daily in the evening. Take with meals. Do not crush, chew, or split.    METOPROLOL TARTRATE (LOPRESSOR) 25 MG TABLET    Take 1 tablet (25 mg) by mouth 2 times a day.    MULTIVITAMIN TABLET    Take 1 tablet by mouth once daily.    PANTOPRAZOLE (PROTONIX) 40 MG EC TABLET    Take 1 tablet (40 mg) by mouth once daily in the morning. Take before meals. Do not crush, chew, or split.    PAROXETINE (PAXIL) 10 MG TABLET    TAKE 1 TABLET DAILY IN THE MORNING    SILDENAFIL (VIAGRA) 50 MG TABLET    Take 1 tablet (50 mg) by mouth if needed.    TORSEMIDE (DEMADEX) 100 MG TABLET    Take 1 tablet (100 mg) by mouth once daily.   Modified Medications    No medications on file   Discontinued Medications    ACETAMINOPHEN-CODEINE (TYLENOL W/ CODEINE #3) 300-30 MG TABLET    Take 1 tablet by mouth every 6 hours if needed.    MELOXICAM (MOBIC) 15 MG TABLET    Take 1 tablet (15 mg) by mouth once daily.       Objective   Physical Exam  General: Patient in no acute distress   HEENT: Atraumatic normocephalic.  Neck: Supple, jugular venous pressure within normal limit.  No bruits  Lungs: Clear to auscultation bilaterally  Cardiovascular: Regular rate and rhythm, normal heart sounds, no murmurs rubs or gallops  Abdomen: Soft nontender nondistended.  Normal bowel sounds.  Extremities: Warm to touch, no edema.      Lab Review   Lab on 01/04/2024   Component Date Value    Glucose 01/04/2024 122 (H)     Sodium 01/04/2024 137     Potassium 01/04/2024 4.3     Chloride 01/04/2024 99      Bicarbonate 01/04/2024 28     Anion Gap 01/04/2024 14     Urea Nitrogen 01/04/2024 25 (H)     Creatinine 01/04/2024 1.21     eGFR 01/04/2024 69     Calcium 01/04/2024 9.6     WBC 01/04/2024 8.7     nRBC 01/04/2024 0.0     RBC 01/04/2024 4.11 (L)     Hemoglobin 01/04/2024 13.5     Hematocrit 01/04/2024 39.9 (L)     MCV 01/04/2024 97     MCH 01/04/2024 32.8     MCHC 01/04/2024 33.8     RDW 01/04/2024 15.3 (H)     Platelets 01/04/2024 260     Neutrophils % 01/04/2024 50.0     Immature Granulocytes %,* 01/04/2024 0.3     Lymphocytes % 01/04/2024 25.8     Monocytes % 01/04/2024 19.6     Eosinophils % 01/04/2024 2.8     Basophils % 01/04/2024 1.5     Neutrophils Absolute 01/04/2024 4.35     Immature Granulocytes Ab* 01/04/2024 0.03     Lymphocytes Absolute 01/04/2024 2.25     Monocytes Absolute 01/04/2024 1.71 (H)     Eosinophils Absolute 01/04/2024 0.24     Basophils Absolute 01/04/2024 0.13 (H)     Albumin 01/04/2024 4.4     Bilirubin, Total 01/04/2024 1.0     Bilirubin, Direct 01/04/2024 0.3     Alkaline Phosphatase 01/04/2024 135 (H)     ALT 01/04/2024 14     AST 01/04/2024 17     Total Protein 01/04/2024 7.2     Protime 01/04/2024 11.5     INR 01/04/2024 1.0     Alpha-1 Antitrypsin Phen* 01/04/2024 MM     Alpha-1-Antitrypsin 01/04/2024 193     IgG 01/04/2024 1,400     IgA 01/04/2024 245     IgM 01/04/2024 79     Tissue Transglutaminase,* 01/04/2024 <1.0     Ferritin 01/04/2024 155     Iron 01/04/2024 84     UIBC 01/04/2024 288     TIBC 01/04/2024 372     % Saturation 01/04/2024 23 (L)         Assessment/Plan   Patient Active Problem List   Diagnosis    Abnormal liver diagnostic imaging    High alkaline phosphatase    Acute cough    Abdominal pain    Chest pain    Lumbago    Anxiety    Aortic valve disorder    Coronary arteriosclerosis    Basal cell carcinoma    Blood glucose abnormal    Chronic systolic heart failure (CMS/HCC)    DM (diabetes mellitus) (CMS/HCC)    Dyslipidemia    Elbow pain    Electrocardiogram  abnormal    Erythropoietic porphyria (CMS/HCC)    Essential hypertension    Finger infection    Gastroesophageal reflux disease    Heart disease    Low blood pressure    Heart failure (CMS/HCC)    Hodgkin lymphoma (CMS/HCC)    Hyperbilirubinemia    Mixed hyperlipidemia    Ileitis    Incisional hernia    Jaundice    Lung mass    Pleural effusion    Pneumonia    Postoperative pain    Pulmonary hypertension (CMS/HCC)    Shortness of breath    Goiter    Thyromegaly    Type 2 diabetes mellitus without complication (CMS/HCC)    Venous thrombosis    Hx of replacement of aortic valve      58 year old male patient here today following up on hx of CAD/CHF and angina status post CABG 4V, patient had open right thoracotomy with pleurodesis for recurrent pleural effusion. Patient had hospitalization for NSTEMI status post cardiac catheterization on 06/29/2020, had PCI to RCA with 2 overlapped stents, RCA graft was down and still has LM and filing LCX severe disease, asymptomatic now and similar to cath six years ago. 2D Echo on 08/24/2020 was normal. Patient returns to my office for follow-up. Has been having no chest pain or shortness of breath. Physically active. Denies any cardiac symptoms. EKG today showed normal sinus rhythm nonsmalities. Patient appears stable discussed with him lifestyle modification. Will continue current medications follow-up in 6 months.    Andrea Hong MD

## 2024-01-30 ENCOUNTER — APPOINTMENT (OUTPATIENT)
Dept: GASTROENTEROLOGY | Facility: CLINIC | Age: 59
End: 2024-01-30
Payer: MEDICARE

## 2024-01-30 ENCOUNTER — TELEPHONE (OUTPATIENT)
Dept: PRIMARY CARE | Facility: CLINIC | Age: 59
End: 2024-01-30

## 2024-01-30 DIAGNOSIS — F41.9 ANXIETY: Primary | ICD-10-CM

## 2024-01-30 NOTE — TELEPHONE ENCOUNTER
Call placed to patient, a female answered the phone, stated she was going to get the patient and the call failed. Will try to reach patient again.

## 2024-01-30 NOTE — TELEPHONE ENCOUNTER
"Patient called back, states he had to put his dog down yesterday and has been experiencing some anxiety and feels \"edgy\". Pt is asking if PCP can prescribe something to help with his anxiety. Pt is already on paxil but is still very upset. Pt states he feels the next week will be difficult for him. Please advise.   "

## 2024-01-31 RX ORDER — HYDROXYZINE PAMOATE 25 MG/1
25 CAPSULE ORAL 3 TIMES DAILY PRN
Qty: 30 CAPSULE | Refills: 0 | Status: SHIPPED | OUTPATIENT
Start: 2024-01-31 | End: 2024-02-10

## 2024-01-31 NOTE — TELEPHONE ENCOUNTER
Spoke with pt wife who states it will be [facility] in [place]. Please advise as pharmacy was populated.

## 2024-04-04 DIAGNOSIS — N52.9 ERECTILE DYSFUNCTION, UNSPECIFIED ERECTILE DYSFUNCTION TYPE: ICD-10-CM

## 2024-04-04 RX ORDER — SILDENAFIL 50 MG/1
50 TABLET, FILM COATED ORAL AS NEEDED
Qty: 10 TABLET | Refills: 5 | Status: SHIPPED | OUTPATIENT
Start: 2024-04-04

## 2024-04-04 NOTE — TELEPHONE ENCOUNTER
Patient is requesting a refill of the following medication(s) for a 10 day supply with refills.   Please send the attached prescription(s) as soon as possible.   Thank you.     Requested Prescriptions     Pending Prescriptions Disp Refills    sildenafil (Viagra) 50 mg tablet 10 tablet 5     Sig: Take 1 tablet (50 mg) by mouth if needed for erectile dysfunction for up to 60 doses.

## 2024-04-12 DIAGNOSIS — I10 ESSENTIAL HYPERTENSION: ICD-10-CM

## 2024-04-12 NOTE — TELEPHONE ENCOUNTER
Patients pharmacy is requesting refill of the following medication(s) for a 90 day supply with refills.   Please send the attached prescription(s) as soon as possible.   Thank you.    Requested Prescriptions     Pending Prescriptions Disp Refills    metoprolol tartrate (Lopressor) 25 mg tablet [Pharmacy Med Name: METOPROLOL TARTRATE TABS 25MG] 180 tablet 3     Sig: TAKE 1 TABLET TWICE A DAY WITH FOOD

## 2024-04-15 RX ORDER — METOPROLOL TARTRATE 25 MG/1
25 TABLET, FILM COATED ORAL
Qty: 180 TABLET | Refills: 3 | Status: SHIPPED | OUTPATIENT
Start: 2024-04-15

## 2024-04-18 ENCOUNTER — APPOINTMENT (OUTPATIENT)
Dept: RADIOLOGY | Facility: CLINIC | Age: 59
End: 2024-04-18
Payer: COMMERCIAL

## 2024-05-02 ENCOUNTER — APPOINTMENT (OUTPATIENT)
Dept: RADIOLOGY | Facility: CLINIC | Age: 59
End: 2024-05-02
Payer: MEDICARE

## 2024-05-06 ENCOUNTER — APPOINTMENT (OUTPATIENT)
Dept: RADIOLOGY | Facility: CLINIC | Age: 59
End: 2024-05-06
Payer: MEDICARE

## 2024-05-14 ENCOUNTER — APPOINTMENT (OUTPATIENT)
Dept: GASTROENTEROLOGY | Facility: CLINIC | Age: 59
End: 2024-05-14
Payer: COMMERCIAL

## 2024-06-06 DIAGNOSIS — I50.22 CHRONIC SYSTOLIC HEART FAILURE (MULTI): ICD-10-CM

## 2024-06-06 RX ORDER — CLOPIDOGREL BISULFATE 75 MG/1
75 TABLET ORAL DAILY
Qty: 90 TABLET | Refills: 3 | Status: SHIPPED | OUTPATIENT
Start: 2024-06-06

## 2024-06-06 NOTE — TELEPHONE ENCOUNTER
Patients pharmacy is requesting refill of the following medication(s) for a 90 day supply with refills.   Please send the attached prescription(s) as soon as possible.   Thank you.    Requested Prescriptions     Pending Prescriptions Disp Refills    clopidogrel (Plavix) 75 mg tablet [Pharmacy Med Name: CLOPIDOGREL BISULFATE TABS 75MG] 90 tablet 3     Sig: TAKE 1 TABLET DAILY

## 2024-07-24 ENCOUNTER — APPOINTMENT (OUTPATIENT)
Dept: CARDIOLOGY | Facility: CLINIC | Age: 59
End: 2024-07-24
Payer: COMMERCIAL

## 2024-07-24 VITALS
SYSTOLIC BLOOD PRESSURE: 113 MMHG | WEIGHT: 178 LBS | TEMPERATURE: 98 F | OXYGEN SATURATION: 98 % | RESPIRATION RATE: 18 BRPM | DIASTOLIC BLOOD PRESSURE: 73 MMHG | HEIGHT: 71 IN | HEART RATE: 85 BPM | BODY MASS INDEX: 24.92 KG/M2

## 2024-07-24 DIAGNOSIS — R94.31 ELECTROCARDIOGRAM ABNORMAL: ICD-10-CM

## 2024-07-24 DIAGNOSIS — I51.9 HEART DISEASE: ICD-10-CM

## 2024-07-24 DIAGNOSIS — I10 ESSENTIAL HYPERTENSION: ICD-10-CM

## 2024-07-24 DIAGNOSIS — I50.30 DIASTOLIC HEART FAILURE, UNSPECIFIED HF CHRONICITY (MULTI): ICD-10-CM

## 2024-07-24 DIAGNOSIS — E78.2 MIXED HYPERLIPIDEMIA: ICD-10-CM

## 2024-07-24 DIAGNOSIS — Z95.2 HX OF REPLACEMENT OF AORTIC VALVE: ICD-10-CM

## 2024-07-24 DIAGNOSIS — R07.9 CHEST PAIN, UNSPECIFIED TYPE: ICD-10-CM

## 2024-07-24 DIAGNOSIS — E78.5 DYSLIPIDEMIA: ICD-10-CM

## 2024-07-24 DIAGNOSIS — I27.20 PULMONARY HYPERTENSION (MULTI): Primary | ICD-10-CM

## 2024-07-24 DIAGNOSIS — I95.89 OTHER SPECIFIED HYPOTENSION: ICD-10-CM

## 2024-07-24 PROCEDURE — 3078F DIAST BP <80 MM HG: CPT | Performed by: INTERNAL MEDICINE

## 2024-07-24 PROCEDURE — 3074F SYST BP LT 130 MM HG: CPT | Performed by: INTERNAL MEDICINE

## 2024-07-24 PROCEDURE — 3008F BODY MASS INDEX DOCD: CPT | Performed by: INTERNAL MEDICINE

## 2024-07-24 PROCEDURE — 99214 OFFICE O/P EST MOD 30 MIN: CPT | Performed by: INTERNAL MEDICINE

## 2024-07-24 PROCEDURE — 4010F ACE/ARB THERAPY RXD/TAKEN: CPT | Performed by: INTERNAL MEDICINE

## 2024-07-24 ASSESSMENT — LIFESTYLE VARIABLES
HAS A RELATIVE, FRIEND, DOCTOR, OR ANOTHER HEALTH PROFESSIONAL EXPRESSED CONCERN ABOUT YOUR DRINKING OR SUGGESTED YOU CUT DOWN: NO
AUDIT-C TOTAL SCORE: 5
HOW MANY STANDARD DRINKS CONTAINING ALCOHOL DO YOU HAVE ON A TYPICAL DAY: 3 OR 4
HOW OFTEN DO YOU HAVE SIX OR MORE DRINKS ON ONE OCCASION: NEVER
HOW OFTEN DURING THE LAST YEAR HAVE YOU BEEN UNABLE TO REMEMBER WHAT HAPPENED THE NIGHT BEFORE BECAUSE YOU HAD BEEN DRINKING: NEVER
HOW OFTEN DO YOU HAVE A DRINK CONTAINING ALCOHOL: 4 OR MORE TIMES A WEEK
HOW OFTEN DURING THE LAST YEAR HAVE YOU HAD A FEELING OF GUILT OR REMORSE AFTER DRINKING: NEVER
HOW OFTEN DURING THE LAST YEAR HAVE YOU FAILED TO DO WHAT WAS NORMALLY EXPECTED FROM YOU BECAUSE OF DRINKING: NEVER
SKIP TO QUESTIONS 9-10: 0
HOW OFTEN DURING THE LAST YEAR HAVE YOU NEEDED AN ALCOHOLIC DRINK FIRST THING IN THE MORNING TO GET YOURSELF GOING AFTER A NIGHT OF HEAVY DRINKING: NEVER
HAVE YOU OR SOMEONE ELSE BEEN INJURED AS A RESULT OF YOUR DRINKING: NO
HOW OFTEN DURING THE LAST YEAR HAVE YOU FOUND THAT YOU WERE NOT ABLE TO STOP DRINKING ONCE YOU HAD STARTED: NEVER
AUDIT TOTAL SCORE: 5

## 2024-07-24 ASSESSMENT — ENCOUNTER SYMPTOMS
OCCASIONAL FEELINGS OF UNSTEADINESS: 0
DEPRESSION: 0
LOSS OF SENSATION IN FEET: 0

## 2024-07-24 ASSESSMENT — PATIENT HEALTH QUESTIONNAIRE - PHQ9
1. LITTLE INTEREST OR PLEASURE IN DOING THINGS: NOT AT ALL
2. FEELING DOWN, DEPRESSED OR HOPELESS: NOT AT ALL
SUM OF ALL RESPONSES TO PHQ9 QUESTIONS 1 AND 2: 0

## 2024-07-24 ASSESSMENT — PAIN SCALES - GENERAL: PAINLEVEL: 0-NO PAIN

## 2024-07-24 NOTE — PROGRESS NOTES
History of present illness:  59 year old male patient here today following up on hx of CAD/CHF and angina status post CABG 4V, patient had open right thoracotomy with pleurodesis for recurrent pleural effusion. Patient had hospitalization for NSTEMI status post cardiac catheterization on 06/29/2020, had PCI to RCA with 2 overlapped stents, RCA graft was down and still has LM and filing LCX severe disease, asymptomatic now and similar to cath six years ago. 2D Echo on 08/24/2020 was normal. Patient returns to my office for follow-up. Has been having no chest pain or shortness of breath. Physically active. Denies any cardiac symptoms.    Past Medical History:   Diagnosis Date    Aortic valve disorder 01/06/2015    Basal cell carcinoma 01/22/2024    Chest pain 01/22/2024    Chronic systolic heart failure (Multi) 01/22/2024    Coronary arteriosclerosis 01/22/2024    Essential hypertension 01/22/2024    Hodgkin lymphoma (Multi) 01/22/2024    Hx of replacement of aortic valve 01/22/2024    Mixed hyperlipidemia 02/23/2005    Pleural effusion 01/22/2024    Shortness of breath 01/22/2024    Type 2 diabetes mellitus without complication (Multi) 08/02/2023    Venous thrombosis 01/22/2024       Past Surgical History:   Procedure Laterality Date    CT GUIDED CHEST TUBE PLACEMENT  7/25/2018    CT GUIDED CHEST TUBE PLACEMENT [facility]       Allergies   Allergen Reactions    Phenobarbital Rash and Hives        reports that he has never smoked. He has never been exposed to tobacco smoke. He has never used smokeless tobacco. He reports current alcohol use of about 6.0 standard drinks of alcohol per week. He reports that he does not use drugs.    Family History   Problem Relation Name Age of Onset    No Known Problems Mother      No Known Problems Father         Patient's Medications   New Prescriptions    No medications on file   Previous Medications    ASPIRIN 81 MG EC TABLET    Take 1 tablet (81 mg) by mouth once daily.     ATORVASTATIN (LIPITOR) 80 MG TABLET    Take 1 tablet (80 mg) by mouth once daily.    CLOPIDOGREL (PLAVIX) 75 MG TABLET    TAKE 1 TABLET DAILY    HYDROXYZINE PAMOATE (VISTARIL) 25 MG CAPSULE    Take 1 capsule (25 mg) by mouth 3 times a day as needed for anxiety for up to 10 days.    LEVOTHYROXINE (SYNTHROID, LEVOXYL) 25 MCG TABLET    Take 1 tablet (25 mcg) by mouth once daily in the morning. Take before meals.    LISINOPRIL 5 MG TABLET    Take 1 tablet (5 mg) by mouth once daily.    METFORMIN, OSM, (FORTAMET) 500 MG 24 HR TABLET    Take 1 tablet (500 mg) by mouth once daily in the evening. Take with meals. Do not crush, chew, or split.    METOPROLOL TARTRATE (LOPRESSOR) 25 MG TABLET    TAKE 1 TABLET TWICE A DAY WITH FOOD    MULTIVITAMIN TABLET    Take 1 tablet by mouth once daily.    PANTOPRAZOLE (PROTONIX) 40 MG EC TABLET    Take 1 tablet (40 mg) by mouth once daily in the morning. Take before meals.    PAROXETINE (PAXIL) 10 MG TABLET    TAKE 1 TABLET DAILY IN THE MORNING    SILDENAFIL (VIAGRA) 50 MG TABLET    Take 1 tablet (50 mg) by mouth if needed for erectile dysfunction for up to 60 doses.    TORSEMIDE (DEMADEX) 100 MG TABLET    Take 1 tablet (100 mg) by mouth once daily.   Modified Medications    No medications on file   Discontinued Medications    No medications on file       Objective   Physical Exam  General: Patient in no acute distress   HEENT: Atraumatic normocephalic.  Neck: Supple, jugular venous pressure within normal limit.  No bruits  Lungs: Clear to auscultation bilaterally  Cardiovascular: Regular rate and rhythm, normal heart sounds, no murmurs rubs or gallops  Abdomen: Soft nontender nondistended.  Normal bowel sounds.  Extremities: Warm to touch, no edema.      Lab Review   No visits with results within 2 Month(s) from this visit.   Latest known visit with results is:   Lab on 01/04/2024   Component Date Value    Glucose 01/04/2024 122 (H)     Sodium 01/04/2024 137     Potassium 01/04/2024  4.3     Chloride 01/04/2024 99     Bicarbonate 01/04/2024 28     Anion Gap 01/04/2024 14     Urea Nitrogen 01/04/2024 25 (H)     Creatinine 01/04/2024 1.21     eGFR 01/04/2024 69     Calcium 01/04/2024 9.6     WBC 01/04/2024 8.7     nRBC 01/04/2024 0.0     RBC 01/04/2024 4.11 (L)     Hemoglobin 01/04/2024 13.5     Hematocrit 01/04/2024 39.9 (L)     MCV 01/04/2024 97     MCH 01/04/2024 32.8     MCHC 01/04/2024 33.8     RDW 01/04/2024 15.3 (H)     Platelets 01/04/2024 260     Neutrophils % 01/04/2024 50.0     Immature Granulocytes %,* 01/04/2024 0.3     Lymphocytes % 01/04/2024 25.8     Monocytes % 01/04/2024 19.6     Eosinophils % 01/04/2024 2.8     Basophils % 01/04/2024 1.5     Neutrophils Absolute 01/04/2024 4.35     Immature Granulocytes Ab* 01/04/2024 0.03     Lymphocytes Absolute 01/04/2024 2.25     Monocytes Absolute 01/04/2024 1.71 (H)     Eosinophils Absolute 01/04/2024 0.24     Basophils Absolute 01/04/2024 0.13 (H)     Albumin 01/04/2024 4.4     Bilirubin, Total 01/04/2024 1.0     Bilirubin, Direct 01/04/2024 0.3     Alkaline Phosphatase 01/04/2024 135 (H)     ALT 01/04/2024 14     AST 01/04/2024 17     Total Protein 01/04/2024 7.2     Protime 01/04/2024 11.5     INR 01/04/2024 1.0     Alpha-1 Antitrypsin Phen* 01/04/2024 MM     Alpha-1-Antitrypsin 01/04/2024 193     IgG 01/04/2024 1,400     IgA 01/04/2024 245     IgM 01/04/2024 79     Tissue Transglutaminase,* 01/04/2024 <1.0     Ferritin 01/04/2024 155     Iron 01/04/2024 84     UIBC 01/04/2024 288     TIBC 01/04/2024 372     % Saturation 01/04/2024 23 (L)         Assessment/Plan   Patient Active Problem List   Diagnosis    Abnormal liver diagnostic imaging    High alkaline phosphatase    Acute cough    Abdominal pain    Chest pain    Lumbago    Anxiety    Aortic valve disorder    Coronary arteriosclerosis    Basal cell carcinoma    Blood glucose abnormal    Chronic systolic heart failure (Multi)    DM (diabetes mellitus) (Multi)    Dyslipidemia    Elbow  pain    Electrocardiogram abnormal    Erythropoietic porphyria (Multi)    Essential hypertension    Finger infection    Gastroesophageal reflux disease    Heart disease    Low blood pressure    Heart failure (Multi)    Hodgkin lymphoma (Multi)    Hyperbilirubinemia    Mixed hyperlipidemia    Ileitis    Incisional hernia    Jaundice    Lung mass    Pleural effusion    Pneumonia    Postoperative pain    Pulmonary hypertension (Multi)    Shortness of breath    Goiter    Thyromegaly    Type 2 diabetes mellitus without complication (Multi)    Venous thrombosis    Hx of replacement of aortic valve      59 year old male patient here today following up on hx of CAD/CHF and angina status post CABG 4V, patient had open right thoracotomy with pleurodesis for recurrent pleural effusion. Patient had hospitalization for NSTEMI status post cardiac catheterization on 06/29/2020, had PCI to RCA with 2 overlapped stents, RCA graft was down and still has LM and filing LCX severe disease, asymptomatic now and similar to cath six years ago. 2D Echo on 08/24/2020 was normal. Patient returns to my office for follow-up. Has been having no chest pain or shortness of breath. Physically active. Denies any cardiac symptoms.  At this point we will continue current treatment.  We will repeat his lipid panel to make sure his LDL at target we will follow-up in 6 months  Andrea Hong MD

## 2024-10-07 ENCOUNTER — OFFICE VISIT (OUTPATIENT)
Dept: URGENT CARE | Age: 59
End: 2024-10-07
Payer: MEDICARE

## 2024-10-07 ENCOUNTER — HOSPITAL ENCOUNTER (EMERGENCY)
Facility: HOSPITAL | Age: 59
Discharge: HOME | End: 2024-10-08
Attending: STUDENT IN AN ORGANIZED HEALTH CARE EDUCATION/TRAINING PROGRAM
Payer: MEDICARE

## 2024-10-07 ENCOUNTER — APPOINTMENT (OUTPATIENT)
Dept: CARDIOLOGY | Facility: HOSPITAL | Age: 59
End: 2024-10-07
Payer: MEDICARE

## 2024-10-07 ENCOUNTER — APPOINTMENT (OUTPATIENT)
Dept: RADIOLOGY | Facility: HOSPITAL | Age: 59
End: 2024-10-07
Payer: MEDICARE

## 2024-10-07 ENCOUNTER — TELEPHONE (OUTPATIENT)
Dept: CARDIOLOGY | Facility: CLINIC | Age: 59
End: 2024-10-07
Payer: COMMERCIAL

## 2024-10-07 ENCOUNTER — HOSPITAL ENCOUNTER (OUTPATIENT)
Dept: RADIOLOGY | Facility: CLINIC | Age: 59
Discharge: HOME | End: 2024-10-07
Payer: MEDICARE

## 2024-10-07 VITALS
TEMPERATURE: 97.9 F | BODY MASS INDEX: 24.41 KG/M2 | RESPIRATION RATE: 22 BRPM | OXYGEN SATURATION: 94 % | HEART RATE: 82 BPM | SYSTOLIC BLOOD PRESSURE: 127 MMHG | WEIGHT: 175 LBS | DIASTOLIC BLOOD PRESSURE: 71 MMHG

## 2024-10-07 DIAGNOSIS — J18.9 PNEUMONIA OF RIGHT LOWER LOBE DUE TO INFECTIOUS ORGANISM: Primary | ICD-10-CM

## 2024-10-07 DIAGNOSIS — R06.02 SHORTNESS OF BREATH: ICD-10-CM

## 2024-10-07 DIAGNOSIS — F41.9 ANXIETY: ICD-10-CM

## 2024-10-07 DIAGNOSIS — Z76.0 MEDICATION REFILL: ICD-10-CM

## 2024-10-07 DIAGNOSIS — E80.0: ICD-10-CM

## 2024-10-07 DIAGNOSIS — R04.2 HEMOPTYSIS: ICD-10-CM

## 2024-10-07 DIAGNOSIS — R04.2 HEMOPTYSIS: Primary | ICD-10-CM

## 2024-10-07 LAB
ALBUMIN SERPL BCP-MCNC: 4 G/DL (ref 3.4–5)
ALP SERPL-CCNC: 174 U/L (ref 33–120)
ALT SERPL W P-5'-P-CCNC: 13 U/L (ref 10–52)
ANION GAP SERPL CALCULATED.3IONS-SCNC: 14 MMOL/L (ref 10–20)
APPEARANCE UR: CLEAR
AST SERPL W P-5'-P-CCNC: 18 U/L (ref 9–39)
BASOPHILS # BLD AUTO: 0.09 X10*3/UL (ref 0–0.1)
BASOPHILS NFR BLD AUTO: 1.1 %
BILIRUB SERPL-MCNC: 2.5 MG/DL (ref 0–1.2)
BILIRUB UR STRIP.AUTO-MCNC: NEGATIVE MG/DL
BNP SERPL-MCNC: 520 PG/ML (ref 0–99)
BUN SERPL-MCNC: 19 MG/DL (ref 6–23)
CALCIUM SERPL-MCNC: 9.3 MG/DL (ref 8.6–10.3)
CARDIAC TROPONIN I PNL SERPL HS: 10 NG/L (ref 0–20)
CARDIAC TROPONIN I PNL SERPL HS: 11 NG/L (ref 0–20)
CHLORIDE SERPL-SCNC: 98 MMOL/L (ref 98–107)
CO2 SERPL-SCNC: 30 MMOL/L (ref 21–32)
COLOR UR: COLORLESS
CREAT SERPL-MCNC: 1.03 MG/DL (ref 0.5–1.3)
EGFRCR SERPLBLD CKD-EPI 2021: 84 ML/MIN/1.73M*2
EOSINOPHIL # BLD AUTO: 0.04 X10*3/UL (ref 0–0.7)
EOSINOPHIL NFR BLD AUTO: 0.5 %
ERYTHROCYTE [DISTWIDTH] IN BLOOD BY AUTOMATED COUNT: 15.1 % (ref 11.5–14.5)
GLUCOSE SERPL-MCNC: 120 MG/DL (ref 74–99)
GLUCOSE UR STRIP.AUTO-MCNC: NORMAL MG/DL
HCT VFR BLD AUTO: 42.4 % (ref 41–52)
HGB BLD-MCNC: 14.3 G/DL (ref 13.5–17.5)
IMM GRANULOCYTES # BLD AUTO: 0.03 X10*3/UL (ref 0–0.7)
IMM GRANULOCYTES NFR BLD AUTO: 0.4 % (ref 0–0.9)
INR PPP: 1.2 (ref 0.9–1.2)
KETONES UR STRIP.AUTO-MCNC: NEGATIVE MG/DL
LEUKOCYTE ESTERASE UR QL STRIP.AUTO: NEGATIVE
LYMPHOCYTES # BLD AUTO: 1.47 X10*3/UL (ref 1.2–4.8)
LYMPHOCYTES NFR BLD AUTO: 17.8 %
MCH RBC QN AUTO: 31.9 PG (ref 26–34)
MCHC RBC AUTO-ENTMCNC: 33.7 G/DL (ref 32–36)
MCV RBC AUTO: 95 FL (ref 80–100)
MONOCYTES # BLD AUTO: 1.23 X10*3/UL (ref 0.1–1)
MONOCYTES NFR BLD AUTO: 14.9 %
NEUTROPHILS # BLD AUTO: 5.39 X10*3/UL (ref 1.2–7.7)
NEUTROPHILS NFR BLD AUTO: 65.3 %
NITRITE UR QL STRIP.AUTO: NEGATIVE
NRBC BLD-RTO: 0 /100 WBCS (ref 0–0)
PH UR STRIP.AUTO: 7 [PH]
PLATELET # BLD AUTO: 232 X10*3/UL (ref 150–450)
POTASSIUM SERPL-SCNC: 3.2 MMOL/L (ref 3.5–5.3)
PROT SERPL-MCNC: 7.9 G/DL (ref 6.4–8.2)
PROT UR STRIP.AUTO-MCNC: NEGATIVE MG/DL
PROTHROMBIN TIME: 12.1 SECONDS (ref 9.3–12.7)
RBC # BLD AUTO: 4.48 X10*6/UL (ref 4.5–5.9)
RBC # UR STRIP.AUTO: NEGATIVE /UL
SODIUM SERPL-SCNC: 139 MMOL/L (ref 136–145)
SP GR UR STRIP.AUTO: 1.01
UROBILINOGEN UR STRIP.AUTO-MCNC: NORMAL MG/DL
WBC # BLD AUTO: 8.3 X10*3/UL (ref 4.4–11.3)

## 2024-10-07 PROCEDURE — 71275 CT ANGIOGRAPHY CHEST: CPT

## 2024-10-07 PROCEDURE — 71046 X-RAY EXAM CHEST 2 VIEWS: CPT | Performed by: RADIOLOGY

## 2024-10-07 PROCEDURE — 83880 ASSAY OF NATRIURETIC PEPTIDE: CPT | Performed by: PHYSICIAN ASSISTANT

## 2024-10-07 PROCEDURE — 81003 URINALYSIS AUTO W/O SCOPE: CPT | Performed by: PHYSICIAN ASSISTANT

## 2024-10-07 PROCEDURE — 84484 ASSAY OF TROPONIN QUANT: CPT | Performed by: PHYSICIAN ASSISTANT

## 2024-10-07 PROCEDURE — 99285 EMERGENCY DEPT VISIT HI MDM: CPT | Mod: 25

## 2024-10-07 PROCEDURE — 71046 X-RAY EXAM CHEST 2 VIEWS: CPT

## 2024-10-07 PROCEDURE — 71275 CT ANGIOGRAPHY CHEST: CPT | Performed by: RADIOLOGY

## 2024-10-07 PROCEDURE — 2550000001 HC RX 255 CONTRASTS: Performed by: STUDENT IN AN ORGANIZED HEALTH CARE EDUCATION/TRAINING PROGRAM

## 2024-10-07 PROCEDURE — 84075 ASSAY ALKALINE PHOSPHATASE: CPT | Performed by: PHYSICIAN ASSISTANT

## 2024-10-07 PROCEDURE — 36415 COLL VENOUS BLD VENIPUNCTURE: CPT | Performed by: PHYSICIAN ASSISTANT

## 2024-10-07 PROCEDURE — 93005 ELECTROCARDIOGRAM TRACING: CPT

## 2024-10-07 PROCEDURE — 85610 PROTHROMBIN TIME: CPT | Performed by: PHYSICIAN ASSISTANT

## 2024-10-07 PROCEDURE — 85025 COMPLETE CBC W/AUTO DIFF WBC: CPT | Performed by: PHYSICIAN ASSISTANT

## 2024-10-07 ASSESSMENT — COLUMBIA-SUICIDE SEVERITY RATING SCALE - C-SSRS
1. IN THE PAST MONTH, HAVE YOU WISHED YOU WERE DEAD OR WISHED YOU COULD GO TO SLEEP AND NOT WAKE UP?: YES
2. HAVE YOU ACTUALLY HAD ANY THOUGHTS OF KILLING YOURSELF?: YES
6. HAVE YOU EVER DONE ANYTHING, STARTED TO DO ANYTHING, OR PREPARED TO DO ANYTHING TO END YOUR LIFE?: NO
4. HAVE YOU HAD THESE THOUGHTS AND HAD SOME INTENTION OF ACTING ON THEM?: NO
5. HAVE YOU STARTED TO WORK OUT OR WORKED OUT THE DETAILS OF HOW TO KILL YOURSELF? DO YOU INTEND TO CARRY OUT THIS PLAN?: NO

## 2024-10-07 ASSESSMENT — ENCOUNTER SYMPTOMS
APNEA: 0
ARTHRALGIAS: 0
SORE THROAT: 0
PALPITATIONS: 0
CONFUSION: 0
VOICE CHANGE: 0
COUGH: 1
CHILLS: 1
SHORTNESS OF BREATH: 1
LIGHT-HEADEDNESS: 0
NAUSEA: 0
VOMITING: 1

## 2024-10-07 ASSESSMENT — PAIN SCALES - GENERAL
PAINLEVEL_OUTOF10: 0 - NO PAIN
PAINLEVEL: 0-NO PAIN

## 2024-10-07 ASSESSMENT — PAIN - FUNCTIONAL ASSESSMENT: PAIN_FUNCTIONAL_ASSESSMENT: 0-10

## 2024-10-07 NOTE — TELEPHONE ENCOUNTER
Patient has had a bad cough in which last night he stated he almost felt like he was drowning. The phlegm was light orange/red in color. I advised patient's wife to take him to urgent care/ER and to keep us updated.

## 2024-10-07 NOTE — PATIENT INSTRUCTIONS
59-year-old with complex past medical history with acute exacerbation of symptoms with hemoptysis with x-ray indicated of right sided pleural effusion moderate to large.  Patient needs evaluation in the emergency room with additional test imaging studies.  [facility]  This note has been CCed to his cardiologist and his PCP.  I have called report to the request ER.  His wife will drive him there.

## 2024-10-07 NOTE — PROGRESS NOTES
Subjective   Patient ID: Carl Vazquez is a 59 y.o. male. They present today with a chief complaint of Cough and coughing up mucus  (SOB/ coughing up blood yesterday ).    History of Present Illness  59 year old male patient here today with spouse for worsening shortness of breath several weeks ,cough 2 weeks, hemoptysis 2 episodes last night, no recent COVID infection.  Chills,  swelling left leg greater than right. Vomiting last night .  Dizziness or palpitations.  Remote history of malignancy with chemo and radiation to chest causing significant complications.     hx of CAD/CHF and angina status post CABG 4V, patient had open right thoracotomy with pleurodesis for recurrent pleural effusion. Patient had hospitalization for NSTEMI status post cardiac catheterization on 06/29/2020, had PCI to RCA with 2 overlapped stents, RCA graft was down and still has LM and filing LCX severe disease, asymptomatic now and similar to cath six years ago.       Cough  Associated symptoms include chest pain, chills and shortness of breath. Pertinent negatives include no sore throat.       Past Medical History  Allergies as of 10/07/2024 - Reviewed 10/07/2024   Allergen Reaction Noted    Phenobarbital Rash and Hives 02/23/2005       (Not in a hospital admission)       Past Medical History:   Diagnosis Date    Aortic valve disorder 01/06/2015    Basal cell carcinoma 01/22/2024    Chest pain 01/22/2024    Chronic systolic heart failure 01/22/2024    Coronary arteriosclerosis 01/22/2024    Essential hypertension 01/22/2024    Hodgkin lymphoma 01/22/2024    Hx of replacement of aortic valve 01/22/2024    Mixed hyperlipidemia 02/23/2005    Pleural effusion 01/22/2024    Shortness of breath 01/22/2024    Type 2 diabetes mellitus without complication (Multi) 08/02/2023    Venous thrombosis 01/22/2024       Past Surgical History:   Procedure Laterality Date    CT GUIDED CHEST TUBE PLACEMENT  7/25/2018    [facility]        reports that he has never smoked. He has never been exposed to tobacco smoke. He has never used smokeless tobacco. He reports current alcohol use of about 6.0 standard drinks of alcohol per week. He reports that he does not use drugs.    Review of Systems  Review of Systems   Constitutional:  Positive for chills.   HENT:  Negative for sore throat and voice change.    Respiratory:  Positive for cough and shortness of breath. Negative for apnea.    Cardiovascular:  Positive for chest pain and leg swelling. Negative for palpitations.   Gastrointestinal:  Positive for vomiting. Negative for nausea.   Endocrine: Positive for cold intolerance.   Musculoskeletal:  Negative for arthralgias.   Neurological:  Negative for syncope and light-headedness.   Psychiatric/Behavioral:  Negative for confusion.                                   Objective    Vitals:    10/07/24 1853   BP: 127/71   Pulse: 82   Resp: 22   Temp: 36.6 °C (97.9 °F)   SpO2: 94%   Weight: 79.4 kg (175 lb)     No LMP for male patient.    Physical Exam  Vitals and nursing note reviewed. Exam conducted with a chaperone present.   Constitutional:       General: He is not in acute distress.     Appearance: Normal appearance. He is not ill-appearing or toxic-appearing.   HENT:      Head: Normocephalic and atraumatic.      Nose: Congestion present. No rhinorrhea.      Mouth/Throat:      Mouth: Mucous membranes are dry.   Eyes:      General: No scleral icterus.  Cardiovascular:      Rate and Rhythm: Normal rate and regular rhythm.      Pulses: Normal pulses.      Heart sounds: Normal heart sounds.   Pulmonary:      Effort: Pulmonary effort is normal. No respiratory distress.      Breath sounds: No stridor. No rhonchi.      Comments: Diminished breath sounds right entire lung  Chest:      Chest wall: No tenderness.   Abdominal:      General: Abdomen is flat.   Musculoskeletal:      Left lower leg: Edema present.   Skin:     Coloration: Skin  is pale.   Neurological:      General: No focal deficit present.      Mental Status: He is alert and oriented to person, place, and time.   Psychiatric:         Mood and Affect: Mood normal.         Behavior: Behavior normal.         Procedures    Point of Care Test & Imaging Results from this visit  No results found for this visit on 10/07/24.   XR chest 2 views    Result Date: 10/7/2024  Interpreted By:  Rene Vincent, STUDY: XR CHEST 2 VIEWS;  10/7/2024 7:22 pm   INDICATION: Signs/Symptoms:58 yo with cough, hemoptysis with diminished right lung sounds..   ,R04.2 Hemoptysis   COMPARISON: 06/28/2020   ACCESSION NUMBER(S): [id number]   ORDERING CLINICIAN: SEJAL CHARLES   FINDINGS:         CARDIOMEDIASTINAL SILHOUETTE: There is enlargement of the cardiac silhouette. Sternotomy wires present.   LUNGS: There is a moderate-sized right pleural effusion. There is dense right basilar airspace disease. Mild left basilar airspace disease as well   ABDOMEN: No remarkable upper abdominal findings.   BONES: No acute osseous changes.       1. Moderate-sized right pleural effusion with associated basilar airspace disease. Underlying pneumonia in the differential. Recommend radiographic follow-up to resolution. 2. Enlarged cardiac silhouette       MACRO: None   Signed by: Rene Vincent 10/7/2024 7:46 PM Dictation workstation:   [id number]     Diagnostic study results (if any) were reviewed by Sejal Charles.    Assessment/Plan   Allergies, medications, history, and pertinent labs/EKGs/Imaging reviewed by Sejal Charles.     Medical Decision Making  59-year-old with complex past medical history with acute exacerbation of symptoms with hemoptysis with x-ray indicated of right sided pleural effusion moderate to large.  Patient needs evaluation in the emergency room with additional test imaging studies.  [facility]  This note has been CCed to his cardiologist and his PCP.  I have called report to the  request ER.  His wife will drive him there.    Orders and Diagnoses  Diagnoses and all orders for this visit:  Hemoptysis  -     XR chest 2 views; Future  Erythropoietic porphyria (Multi)      Medical Admin Record      Patient disposition: ED    Electronically signed by Sejal Adame  7:52 PM

## 2024-10-08 VITALS
RESPIRATION RATE: 16 BRPM | SYSTOLIC BLOOD PRESSURE: 119 MMHG | BODY MASS INDEX: 24.64 KG/M2 | OXYGEN SATURATION: 96 % | DIASTOLIC BLOOD PRESSURE: 73 MMHG | TEMPERATURE: 98.8 F | HEIGHT: 71 IN | WEIGHT: 176 LBS | HEART RATE: 72 BPM

## 2024-10-08 LAB — HOLD SPECIMEN: NORMAL

## 2024-10-08 PROCEDURE — 2500000001 HC RX 250 WO HCPCS SELF ADMINISTERED DRUGS (ALT 637 FOR MEDICARE OP): Performed by: STUDENT IN AN ORGANIZED HEALTH CARE EDUCATION/TRAINING PROGRAM

## 2024-10-08 RX ORDER — DOXYCYCLINE 100 MG/1
100 CAPSULE ORAL 2 TIMES DAILY
Qty: 20 CAPSULE | Refills: 0 | Status: SHIPPED | OUTPATIENT
Start: 2024-10-08 | End: 2024-10-08

## 2024-10-08 RX ORDER — DOXYCYCLINE 100 MG/1
100 CAPSULE ORAL ONCE
Status: COMPLETED | OUTPATIENT
Start: 2024-10-08 | End: 2024-10-08

## 2024-10-08 RX ORDER — DOXYCYCLINE 100 MG/1
100 CAPSULE ORAL 2 TIMES DAILY
Qty: 20 CAPSULE | Refills: 0 | Status: SHIPPED | OUTPATIENT
Start: 2024-10-08 | End: 2024-10-18

## 2024-10-08 NOTE — ED TRIAGE NOTES
Pt arrives to ED per recommendation of Urgent Care with c/o hemoptysis and SOB x 2 weeks, xray today showed right plural effusion. Pt with complex medical history including CAD/CHF, s/p CABG, thoracotomy for recurrent pleural effusions in 2018, NSTEMI with 2 stent placements in 2020, on plavix and lasix, endorses compliance with all medications. Denies fevers however endorses chills. Also endorsing one episode of vomiting, denies nausea or diarrhea. VSS currently

## 2024-10-08 NOTE — DISCHARGE INSTRUCTIONS
You were seen in the emergency apartment today for shortness of breath.  At this time you have evidence of pneumonia in the right lower lung.  This is likely causing your shortness of breath.  I have prescribed antibiotics and given you your first dose here in the Emergency Department.  I have also placed a referral for pulmonology and provided you with the name of a pulmonologist who does cover this hospital.  I do recommend speaking with the appointment center at [phone].

## 2024-10-10 RX ORDER — PAROXETINE 10 MG/1
10 TABLET, FILM COATED ORAL
Qty: 90 TABLET | Refills: 0 | Status: SHIPPED | OUTPATIENT
Start: 2024-10-10

## 2024-10-12 LAB
ATRIAL RATE: 87 BPM
P OFFSET: 154 MS
P ONSET: 105 MS
PR INTERVAL: 214 MS
Q ONSET: 212 MS
QRS COUNT: 15 BEATS
QRS DURATION: 106 MS
QT INTERVAL: 402 MS
QTC CALCULATION(BAZETT): 483 MS
QTC FREDERICIA: 454 MS
R AXIS: 29 DEGREES
T AXIS: 195 DEGREES
T OFFSET: 413 MS
VENTRICULAR RATE: 87 BPM

## 2024-10-28 NOTE — ED PROVIDER NOTES
History of Present Illness     History provided by: Patient and Significant Other  Limitations to History: None  External Records Reviewed with Brief Summary:  Prior outpatient notes    HPI:  Carl Vazquez is a 59 y.o. male presents with hemoptysis.  Patient has a prior medical history of CAD status post CABG, CHF, recurrent pleural effusions requiring thoracentesis.  Patient states that he is currently on Plavix and has been compliant with medications.  Patient notes increased shortness of breath over the past 2 weeks and hemoptysis today.  Patient was seen at urgent care and had a chest x-ray done which showed a possible pleural effusion and given his prior history presented to the emergency department for evaluation.  Associated nausea vomiting, no diarrhea.  No fevers or chills.  No recent sick contacts.    Physical Exam   Triage vitals:  T 37.1 °C (98.8 °F)  HR 89  /74  RR 16  O2 95 % None (Room air)    Physical Exam  Vitals and nursing note reviewed.   Constitutional:       General: He is not in acute distress.     Appearance: He is not ill-appearing.   HENT:      Head: Normocephalic and atraumatic.      Mouth/Throat:      Mouth: Mucous membranes are moist.      Pharynx: Oropharynx is clear.   Eyes:      Extraocular Movements: Extraocular movements intact.      Conjunctiva/sclera: Conjunctivae normal.      Pupils: Pupils are equal, round, and reactive to light.   Cardiovascular:      Rate and Rhythm: Normal rate and regular rhythm.   Pulmonary:      Effort: Pulmonary effort is normal. No respiratory distress.      Breath sounds: Normal breath sounds.   Abdominal:      General: There is no distension.      Palpations: Abdomen is soft.      Tenderness: There is no abdominal tenderness.   Musculoskeletal:         General: No swelling or deformity. Normal range of motion.      Cervical back: Normal range of motion and neck supple.   Skin:     General: Skin is warm and dry.      Capillary Refill:  "Capillary refill takes less than 2 seconds.   Neurological:      General: No focal deficit present.      Mental Status: He is alert and oriented to person, place, and time. Mental status is at baseline.   Psychiatric:         Mood and Affect: Mood normal.         Behavior: Behavior normal.          Medical Decision Making & ED Course   Medical Decision Makin y.o. male presents with hemoptysis.  Considered PE, pneumonia, pleural effusion, malignancy.  Patient currently not on supplemental oxygen, breathing comfortably on room air.  Review of patient's outpatient chest x-ray shows possible pleural effusion.  Labs obtained, no leukocytosis.  Normal renal function, mildly low potassium at 3.2.  BNP is elevated to 520 with troponin x 2 negative.  CT PE obtained to better evaluate the patient parenchyma as well as evaluate possible blood clot.  CT angiogram without evidence of PE and no evidence of pleural effusion.  Patient does describe having history of a \"tacked down lung\", a portion of lung that was unable to be reinflated after thoracentesis.  At this time I suspect the patient's previously seen pleural effusion is actually this portion of the lung that has been improperly reinflated.  Patient does have evidence of multifocal pneumonia seen on CT imaging.  Given this finding, will start patient on antibiotics.  Patient stable for discharge with recommended outpatient follow-up.  ----  Social Determinants of Health which Significantly Impact Care: None identified     EKG Independent Interpretation: EKG interpreted by myself. Please see ED Course for full interpretation.    Independent Result Review and Interpretation: Relevant laboratory and radiographic results were reviewed and independently interpreted by myself.  As necessary, they are commented on in the ED Course.    Chronic conditions affecting the patient's care: As documented above in MDM    The patient was discussed with the following " consultants/services: None    Care Considerations: As documented above in [facility]    ED Course:  ED Course as of 10/28/24 1323   Mon Oct 07, 2024   2124 ECG 12 lead  Performed at  2118, HR of 87, NSR, NAD, QTc 483, no sign of STEMI, T wave inversions to I, II, aVL, V1    Reviewed and interpreted by me at time performed   [JM]   2248 Review of patient's CXR from  shows a large R sided pleural effusion.  Not on supplemental oxygen []   Tue Oct 08, 2024   0132 CT angio chest for pulmonary embolism  IMPRESSION:  1. There is no main or lobar pulmonary embolus visualized. Evaluation  of the segmental and subsegmental branches of the lower lobe is  limited secondary to respiratory motion.  2. There are extensive regions of airspace consolidation noted within  the right lung field. There are bilateral trace pleural effusions  noted. There are scattered ground-glass airspace opacities noted in  the left lung field. Findings are concerning for multifocal pneumonia.   []      ED Course User Index  [] Brittany Leiva MD         Diagnoses as of 10/28/24 1323   Pneumonia of right lower lobe due to infectious organism   Shortness of breath       Procedures   Procedures    Brittany Leiva MD  Emergency Medicine     Brittany Leiva MD  10/28/24 1331

## 2024-10-31 ENCOUNTER — OFFICE VISIT (OUTPATIENT)
Dept: PRIMARY CARE | Facility: CLINIC | Age: 59
End: 2024-10-31
Payer: MEDICARE

## 2024-10-31 VITALS
DIASTOLIC BLOOD PRESSURE: 80 MMHG | BODY MASS INDEX: 24.41 KG/M2 | TEMPERATURE: 99.1 F | OXYGEN SATURATION: 99 % | SYSTOLIC BLOOD PRESSURE: 130 MMHG | WEIGHT: 175 LBS | HEART RATE: 79 BPM

## 2024-10-31 DIAGNOSIS — I10 ESSENTIAL HYPERTENSION: ICD-10-CM

## 2024-10-31 DIAGNOSIS — K21.9 GASTROESOPHAGEAL REFLUX DISEASE WITHOUT ESOPHAGITIS: ICD-10-CM

## 2024-10-31 DIAGNOSIS — Z12.5 PROSTATE CANCER SCREENING: ICD-10-CM

## 2024-10-31 DIAGNOSIS — E11.9 TYPE 2 DIABETES MELLITUS WITHOUT COMPLICATION, WITHOUT LONG-TERM CURRENT USE OF INSULIN (MULTI): ICD-10-CM

## 2024-10-31 DIAGNOSIS — R53.82 CHRONIC FATIGUE: ICD-10-CM

## 2024-10-31 DIAGNOSIS — J18.9 PNEUMONIA OF RIGHT LUNG DUE TO INFECTIOUS ORGANISM, UNSPECIFIED PART OF LUNG: Primary | ICD-10-CM

## 2024-10-31 PROCEDURE — 99214 OFFICE O/P EST MOD 30 MIN: CPT | Performed by: INTERNAL MEDICINE

## 2024-10-31 PROCEDURE — 3075F SYST BP GE 130 - 139MM HG: CPT | Performed by: INTERNAL MEDICINE

## 2024-10-31 PROCEDURE — 1036F TOBACCO NON-USER: CPT | Performed by: INTERNAL MEDICINE

## 2024-10-31 PROCEDURE — 4010F ACE/ARB THERAPY RXD/TAKEN: CPT | Performed by: INTERNAL MEDICINE

## 2024-10-31 PROCEDURE — 3079F DIAST BP 80-89 MM HG: CPT | Performed by: INTERNAL MEDICINE

## 2024-10-31 RX ORDER — AZITHROMYCIN 250 MG/1
TABLET, FILM COATED ORAL
Qty: 6 TABLET | Refills: 0 | Status: SHIPPED | OUTPATIENT
Start: 2024-10-31 | End: 2024-11-05

## 2024-10-31 RX ORDER — ALBUTEROL SULFATE 90 UG/1
2 INHALANT RESPIRATORY (INHALATION) EVERY 4 HOURS PRN
Qty: 18 G | Refills: 0 | Status: SHIPPED | OUTPATIENT
Start: 2024-10-31 | End: 2025-10-31

## 2024-10-31 ASSESSMENT — PATIENT HEALTH QUESTIONNAIRE - PHQ9
2. FEELING DOWN, DEPRESSED OR HOPELESS: NOT AT ALL
1. LITTLE INTEREST OR PLEASURE IN DOING THINGS: NOT AT ALL
SUM OF ALL RESPONSES TO PHQ9 QUESTIONS 1 AND 2: 0

## 2024-10-31 ASSESSMENT — ENCOUNTER SYMPTOMS
POLYDIPSIA: 0
DYSPHORIC MOOD: 1
COUGH: 1
DIZZINESS: 0
FEVER: 0
WHEEZING: 1
SHORTNESS OF BREATH: 0
HEADACHES: 0
FATIGUE: 1
ACTIVITY CHANGE: 0
APPETITE CHANGE: 1
POLYPHAGIA: 0
CHILLS: 0

## 2024-10-31 ASSESSMENT — PAIN SCALES - GENERAL: PAINLEVEL_OUTOF10: 0-NO PAIN

## 2024-11-04 ENCOUNTER — TELEPHONE (OUTPATIENT)
Dept: PRIMARY CARE | Facility: CLINIC | Age: 59
End: 2024-11-04

## 2024-11-04 ENCOUNTER — TELEMEDICINE (OUTPATIENT)
Dept: PRIMARY CARE | Facility: CLINIC | Age: 59
End: 2024-11-04
Payer: COMMERCIAL

## 2024-11-04 ENCOUNTER — HOSPITAL ENCOUNTER (OUTPATIENT)
Dept: RADIOLOGY | Facility: HOSPITAL | Age: 59
Discharge: HOME | End: 2024-11-04
Payer: MEDICARE

## 2024-11-04 ENCOUNTER — APPOINTMENT (OUTPATIENT)
Dept: CARDIOLOGY | Facility: CLINIC | Age: 59
End: 2024-11-04
Payer: MEDICARE

## 2024-11-04 DIAGNOSIS — R05.1 ACUTE COUGH: ICD-10-CM

## 2024-11-04 DIAGNOSIS — J18.9 PNEUMONIA OF RIGHT LUNG DUE TO INFECTIOUS ORGANISM, UNSPECIFIED PART OF LUNG: ICD-10-CM

## 2024-11-04 DIAGNOSIS — J18.9 PNEUMONIA OF RIGHT LUNG DUE TO INFECTIOUS ORGANISM, UNSPECIFIED PART OF LUNG: Primary | ICD-10-CM

## 2024-11-04 PROCEDURE — 1036F TOBACCO NON-USER: CPT | Performed by: INTERNAL MEDICINE

## 2024-11-04 PROCEDURE — 71046 X-RAY EXAM CHEST 2 VIEWS: CPT | Performed by: RADIOLOGY

## 2024-11-04 PROCEDURE — 4010F ACE/ARB THERAPY RXD/TAKEN: CPT | Performed by: INTERNAL MEDICINE

## 2024-11-04 PROCEDURE — 71046 X-RAY EXAM CHEST 2 VIEWS: CPT

## 2024-11-04 PROCEDURE — 99442 PR PHYS/QHP TELEPHONE EVALUATION 11-20 MIN: CPT | Performed by: INTERNAL MEDICINE

## 2024-11-04 RX ORDER — PROMETHAZINE HYDROCHLORIDE AND DEXTROMETHORPHAN HYDROBROMIDE 6.25; 15 MG/5ML; MG/5ML
5 SYRUP ORAL EVERY 4 HOURS PRN
Qty: 118 ML | Refills: 0 | Status: SHIPPED | OUTPATIENT
Start: 2024-11-04 | End: 2024-11-04

## 2024-11-04 RX ORDER — PROMETHAZINE HYDROCHLORIDE AND DEXTROMETHORPHAN HYDROBROMIDE 6.25; 15 MG/5ML; MG/5ML
5 SYRUP ORAL EVERY 4 HOURS PRN
Qty: 118 ML | Refills: 0 | Status: SHIPPED | OUTPATIENT
Start: 2024-11-04 | End: 2024-11-04 | Stop reason: SDUPTHER

## 2024-11-04 RX ORDER — PROMETHAZINE HYDROCHLORIDE AND DEXTROMETHORPHAN HYDROBROMIDE 6.25; 15 MG/5ML; MG/5ML
5 SYRUP ORAL EVERY 4 HOURS PRN
Qty: 118 ML | Refills: 0 | Status: ON HOLD | OUTPATIENT
Start: 2024-11-04

## 2024-11-04 ASSESSMENT — ENCOUNTER SYMPTOMS
FEVER: 0
FATIGUE: 1
DIZZINESS: 0
SHORTNESS OF BREATH: 0
HEADACHES: 0
COUGH: 1
CHILLS: 0
WHEEZING: 0

## 2024-11-04 NOTE — PROGRESS NOTES
Subjective   Patient ID: Carl Vazquez is a 59 y.o. male who presents for No chief complaint on file..    This is a 59 y.o. who was diagnosed with RUL PNA a month ago and was treated with Doxycycline. He had a follow up appt on 10/31 and was c/o continued cough. He was treated with Zithromax, but states that his cough has not improved. He has tried Benzonatate with no relief. He does not have a fever. His pulse ox is 97%.         Review of Systems   Constitutional:  Positive for fatigue. Negative for chills and fever.   HENT:  Negative for congestion.    Respiratory:  Positive for cough. Negative for shortness of breath and wheezing.    Cardiovascular:  Negative for chest pain.   Neurological:  Negative for dizziness and headaches.       Objective   There were no vitals taken for this visit.    Physical Exam  Pulmonary:      Effort: Pulmonary effort is normal.   Neurological:      Mental Status: He is alert and oriented to person, place, and time.   Psychiatric:         Mood and Affect: Mood normal.         Assessment/Plan   Diagnoses and all orders for this visit:  Pneumonia of right lung due to infectious organism, unspecified part of lung  -     XR chest 2 views; Future  Acute cough  -     promethazine-DM (Phenergan-DM) 6.25-15 mg/5 mL syrup; Take 5 mL by mouth every 4 hours if needed for cough.

## 2024-11-04 NOTE — TELEPHONE ENCOUNTER
PT wife calling stating thar RX Promethazine has not been received by the pharmacy-Please resend to [facility] in [place]

## 2024-11-04 NOTE — TELEPHONE ENCOUNTER
Patient left message that he was put on a zpak and an inhaler for his pneumonia but he thinks it has gotten worse. He is running 100.3F temp the last few days but subsided with some advil. Asking if there is something else that can be sent in? Stated he was on a medication from an ER dr for 10 days that worked for him and asking if this can be sent in for him again. Did not leave the medication name. From ER visit, it was  doxycycline hyclate 100 mg oral 2 times daily. Please advise.

## 2024-11-05 ENCOUNTER — TELEPHONE (OUTPATIENT)
Dept: PRIMARY CARE | Facility: CLINIC | Age: 59
End: 2024-11-05
Payer: COMMERCIAL

## 2024-11-06 NOTE — TELEPHONE ENCOUNTER
Results not available. Call placed to patient's wife, advised we are waiting for results from radiology and will call once received and reviewed by PCP. Pt's wife verbalized understanding.

## 2024-11-07 ENCOUNTER — APPOINTMENT (OUTPATIENT)
Dept: RADIOLOGY | Facility: HOSPITAL | Age: 59
DRG: 003 | End: 2024-11-07
Payer: MEDICARE

## 2024-11-07 ENCOUNTER — HOSPITAL ENCOUNTER (INPATIENT)
Facility: HOSPITAL | Age: 59
LOS: 3 days | Discharge: ANOTHER HOSPITAL | End: 2024-11-10
Attending: STUDENT IN AN ORGANIZED HEALTH CARE EDUCATION/TRAINING PROGRAM | Admitting: INTERNAL MEDICINE
Payer: MEDICARE

## 2024-11-07 ENCOUNTER — TELEPHONE (OUTPATIENT)
Dept: PRIMARY CARE | Facility: CLINIC | Age: 59
End: 2024-11-07
Payer: COMMERCIAL

## 2024-11-07 ENCOUNTER — APPOINTMENT (OUTPATIENT)
Dept: CARDIOLOGY | Facility: HOSPITAL | Age: 59
DRG: 003 | End: 2024-11-07
Payer: MEDICARE

## 2024-11-07 DIAGNOSIS — J86.9: ICD-10-CM

## 2024-11-07 DIAGNOSIS — J18.9 PNEUMONIA OF RIGHT LOWER LOBE DUE TO INFECTIOUS ORGANISM: ICD-10-CM

## 2024-11-07 DIAGNOSIS — J86.9 EMPYEMA (MULTI): ICD-10-CM

## 2024-11-07 DIAGNOSIS — J90 PLEURAL EFFUSION ON RIGHT: ICD-10-CM

## 2024-11-07 DIAGNOSIS — I27.20 PULMONARY HYPERTENSION (MULTI): ICD-10-CM

## 2024-11-07 DIAGNOSIS — I26.99 OTHER ACUTE PULMONARY EMBOLISM WITHOUT ACUTE COR PULMONALE: Primary | ICD-10-CM

## 2024-11-07 DIAGNOSIS — Z95.2 HX OF REPLACEMENT OF AORTIC VALVE: ICD-10-CM

## 2024-11-07 DIAGNOSIS — I26.99 ACUTE PULMONARY EMBOLISM, UNSPECIFIED PULMONARY EMBOLISM TYPE, UNSPECIFIED WHETHER ACUTE COR PULMONALE PRESENT (MULTI): ICD-10-CM

## 2024-11-07 PROBLEM — I21.4 NSTEMI (NON-ST ELEVATED MYOCARDIAL INFARCTION) (MULTI): Status: ACTIVE | Noted: 2024-11-07

## 2024-11-07 PROBLEM — N17.9 ACUTE RENAL FAILURE (ARF) (CMS-HCC): Status: ACTIVE | Noted: 2024-11-07

## 2024-11-07 PROBLEM — K81.0 ACUTE CHOLECYSTITIS: Status: ACTIVE | Noted: 2024-11-07

## 2024-11-07 PROBLEM — E44.0 MODERATE PROTEIN-CALORIE MALNUTRITION (MULTI): Status: ACTIVE | Noted: 2024-11-07

## 2024-11-07 PROBLEM — I26.09 ACUTE PULMONARY EMBOLISM WITH ACUTE COR PULMONALE (MULTI): Status: ACTIVE | Noted: 2024-11-07

## 2024-11-07 PROBLEM — R94.31 QT PROLONGATION: Status: ACTIVE | Noted: 2024-11-07

## 2024-11-07 PROBLEM — K52.9 COLITIS: Status: ACTIVE | Noted: 2024-11-07

## 2024-11-07 PROBLEM — D72.829 LEUKOCYTOSIS: Status: ACTIVE | Noted: 2024-11-07

## 2024-11-07 PROBLEM — R74.8 ALKALINE PHOSPHATASE ELEVATION: Status: ACTIVE | Noted: 2024-11-07

## 2024-11-07 PROBLEM — J85.2: Status: ACTIVE | Noted: 2024-11-07

## 2024-11-07 LAB
ALBUMIN SERPL BCP-MCNC: 3 G/DL (ref 3.4–5)
ALP SERPL-CCNC: 259 U/L (ref 33–120)
ALT SERPL W P-5'-P-CCNC: 12 U/L (ref 10–52)
ANION GAP SERPL CALCULATED.3IONS-SCNC: 13 MMOL/L (ref 10–20)
APPEARANCE UR: CLEAR
APTT PPP: 34.2 SECONDS (ref 22–32.5)
AST SERPL W P-5'-P-CCNC: 22 U/L (ref 9–39)
BASOPHILS # BLD AUTO: 0.1 X10*3/UL (ref 0–0.1)
BASOPHILS NFR BLD AUTO: 0.6 %
BILIRUB SERPL-MCNC: 2.7 MG/DL (ref 0–1.2)
BILIRUB UR STRIP.AUTO-MCNC: NEGATIVE MG/DL
BNP SERPL-MCNC: 1095 PG/ML (ref 0–99)
BUN SERPL-MCNC: 30 MG/DL (ref 6–23)
CALCIUM SERPL-MCNC: 9 MG/DL (ref 8.6–10.3)
CARDIAC TROPONIN I PNL SERPL HS: 151 NG/L (ref 0–20)
CARDIAC TROPONIN I PNL SERPL HS: 182 NG/L (ref 0–20)
CHLORIDE SERPL-SCNC: 91 MMOL/L (ref 98–107)
CO2 SERPL-SCNC: 33 MMOL/L (ref 21–32)
COLOR UR: NORMAL
CREAT SERPL-MCNC: 1.41 MG/DL (ref 0.5–1.3)
EGFRCR SERPLBLD CKD-EPI 2021: 57 ML/MIN/1.73M*2
EOSINOPHIL # BLD AUTO: 0.03 X10*3/UL (ref 0–0.7)
EOSINOPHIL NFR BLD AUTO: 0.2 %
ERYTHROCYTE [DISTWIDTH] IN BLOOD BY AUTOMATED COUNT: 14.8 % (ref 11.5–14.5)
ERYTHROCYTE [DISTWIDTH] IN BLOOD BY AUTOMATED COUNT: 14.9 % (ref 11.5–14.5)
GLUCOSE SERPL-MCNC: 139 MG/DL (ref 74–99)
GLUCOSE UR STRIP.AUTO-MCNC: NORMAL MG/DL
HCT VFR BLD AUTO: 37.7 % (ref 41–52)
HCT VFR BLD AUTO: 38 % (ref 41–52)
HGB BLD-MCNC: 13 G/DL (ref 13.5–17.5)
HGB BLD-MCNC: 13.1 G/DL (ref 13.5–17.5)
IMM GRANULOCYTES # BLD AUTO: 0.25 X10*3/UL (ref 0–0.7)
IMM GRANULOCYTES NFR BLD AUTO: 1.5 % (ref 0–0.9)
KETONES UR STRIP.AUTO-MCNC: NEGATIVE MG/DL
LACTATE SERPL-SCNC: 1.9 MMOL/L (ref 0.4–2)
LEUKOCYTE ESTERASE UR QL STRIP.AUTO: NEGATIVE
LYMPHOCYTES # BLD AUTO: 1.87 X10*3/UL (ref 1.2–4.8)
LYMPHOCYTES NFR BLD AUTO: 10.9 %
MCH RBC QN AUTO: 32.2 PG (ref 26–34)
MCH RBC QN AUTO: 32.2 PG (ref 26–34)
MCHC RBC AUTO-ENTMCNC: 34.5 G/DL (ref 32–36)
MCHC RBC AUTO-ENTMCNC: 34.5 G/DL (ref 32–36)
MCV RBC AUTO: 93 FL (ref 80–100)
MCV RBC AUTO: 93 FL (ref 80–100)
MONOCYTES # BLD AUTO: 2.5 X10*3/UL (ref 0.1–1)
MONOCYTES NFR BLD AUTO: 14.5 %
NEUTROPHILS # BLD AUTO: 12.47 X10*3/UL (ref 1.2–7.7)
NEUTROPHILS NFR BLD AUTO: 72.3 %
NITRITE UR QL STRIP.AUTO: NEGATIVE
NRBC BLD-RTO: 0 /100 WBCS (ref 0–0)
NRBC BLD-RTO: 0.1 /100 WBCS (ref 0–0)
PH UR STRIP.AUTO: 6 [PH]
PLATELET # BLD AUTO: 344 X10*3/UL (ref 150–450)
PLATELET # BLD AUTO: 352 X10*3/UL (ref 150–450)
POTASSIUM SERPL-SCNC: 3.5 MMOL/L (ref 3.5–5.3)
PROT SERPL-MCNC: 7.1 G/DL (ref 6.4–8.2)
PROT UR STRIP.AUTO-MCNC: NEGATIVE MG/DL
RBC # BLD AUTO: 4.04 X10*6/UL (ref 4.5–5.9)
RBC # BLD AUTO: 4.07 X10*6/UL (ref 4.5–5.9)
RBC # UR STRIP.AUTO: NEGATIVE /UL
SODIUM SERPL-SCNC: 133 MMOL/L (ref 136–145)
SP GR UR STRIP.AUTO: 1.02
UROBILINOGEN UR STRIP.AUTO-MCNC: NORMAL MG/DL
WBC # BLD AUTO: 17.2 X10*3/UL (ref 4.4–11.3)
WBC # BLD AUTO: 18 X10*3/UL (ref 4.4–11.3)

## 2024-11-07 PROCEDURE — 87075 CULTR BACTERIA EXCEPT BLOOD: CPT | Mod: WESLAB | Performed by: PHYSICIAN ASSISTANT

## 2024-11-07 PROCEDURE — 74177 CT ABD & PELVIS W/CONTRAST: CPT

## 2024-11-07 PROCEDURE — 2060000001 HC INTERMEDIATE ICU ROOM DAILY

## 2024-11-07 PROCEDURE — 36415 COLL VENOUS BLD VENIPUNCTURE: CPT | Performed by: PHYSICIAN ASSISTANT

## 2024-11-07 PROCEDURE — 93970 EXTREMITY STUDY: CPT

## 2024-11-07 PROCEDURE — 99223 1ST HOSP IP/OBS HIGH 75: CPT | Performed by: INTERNAL MEDICINE

## 2024-11-07 PROCEDURE — 81003 URINALYSIS AUTO W/O SCOPE: CPT | Performed by: PHYSICIAN ASSISTANT

## 2024-11-07 PROCEDURE — 2500000004 HC RX 250 GENERAL PHARMACY W/ HCPCS (ALT 636 FOR OP/ED): Performed by: PHYSICIAN ASSISTANT

## 2024-11-07 PROCEDURE — 85027 COMPLETE CBC AUTOMATED: CPT | Performed by: PHYSICIAN ASSISTANT

## 2024-11-07 PROCEDURE — 71046 X-RAY EXAM CHEST 2 VIEWS: CPT

## 2024-11-07 PROCEDURE — 2500000002 HC RX 250 W HCPCS SELF ADMINISTERED DRUGS (ALT 637 FOR MEDICARE OP, ALT 636 FOR OP/ED): Performed by: PHYSICIAN ASSISTANT

## 2024-11-07 PROCEDURE — 0BH17EZ INSERTION OF ENDOTRACHEAL AIRWAY INTO TRACHEA, VIA NATURAL OR ARTIFICIAL OPENING: ICD-10-PCS | Performed by: THORACIC SURGERY (CARDIOTHORACIC VASCULAR SURGERY)

## 2024-11-07 PROCEDURE — 93970 EXTREMITY STUDY: CPT | Performed by: RADIOLOGY

## 2024-11-07 PROCEDURE — 76705 ECHO EXAM OF ABDOMEN: CPT | Performed by: RADIOLOGY

## 2024-11-07 PROCEDURE — 87040 BLOOD CULTURE FOR BACTERIA: CPT | Mod: WESLAB | Performed by: PHYSICIAN ASSISTANT

## 2024-11-07 PROCEDURE — 83605 ASSAY OF LACTIC ACID: CPT | Performed by: PHYSICIAN ASSISTANT

## 2024-11-07 PROCEDURE — 96374 THER/PROPH/DIAG INJ IV PUSH: CPT | Mod: 59

## 2024-11-07 PROCEDURE — 84484 ASSAY OF TROPONIN QUANT: CPT | Performed by: PHYSICIAN ASSISTANT

## 2024-11-07 PROCEDURE — 5A1522G EXTRACORPOREAL OXYGENATION, MEMBRANE, PERIPHERAL VENO-ARTERIAL: ICD-10-PCS | Performed by: THORACIC SURGERY (CARDIOTHORACIC VASCULAR SURGERY)

## 2024-11-07 PROCEDURE — 2550000001 HC RX 255 CONTRASTS: Performed by: STUDENT IN AN ORGANIZED HEALTH CARE EDUCATION/TRAINING PROGRAM

## 2024-11-07 PROCEDURE — 93010 ELECTROCARDIOGRAM REPORT: CPT | Performed by: INTERNAL MEDICINE

## 2024-11-07 PROCEDURE — 5A1935Z RESPIRATORY VENTILATION, LESS THAN 24 CONSECUTIVE HOURS: ICD-10-PCS | Performed by: THORACIC SURGERY (CARDIOTHORACIC VASCULAR SURGERY)

## 2024-11-07 PROCEDURE — 02HV33Z INSERTION OF INFUSION DEVICE INTO SUPERIOR VENA CAVA, PERCUTANEOUS APPROACH: ICD-10-PCS | Performed by: THORACIC SURGERY (CARDIOTHORACIC VASCULAR SURGERY)

## 2024-11-07 PROCEDURE — 96375 TX/PRO/DX INJ NEW DRUG ADDON: CPT

## 2024-11-07 PROCEDURE — 99291 CRITICAL CARE FIRST HOUR: CPT | Mod: 25

## 2024-11-07 PROCEDURE — 2500000002 HC RX 250 W HCPCS SELF ADMINISTERED DRUGS (ALT 637 FOR MEDICARE OP, ALT 636 FOR OP/ED): Performed by: STUDENT IN AN ORGANIZED HEALTH CARE EDUCATION/TRAINING PROGRAM

## 2024-11-07 PROCEDURE — 94640 AIRWAY INHALATION TREATMENT: CPT

## 2024-11-07 PROCEDURE — 71275 CT ANGIOGRAPHY CHEST: CPT

## 2024-11-07 PROCEDURE — 85730 THROMBOPLASTIN TIME PARTIAL: CPT | Performed by: PHYSICIAN ASSISTANT

## 2024-11-07 PROCEDURE — 87449 NOS EACH ORGANISM AG IA: CPT | Mod: WESLAB | Performed by: INTERNAL MEDICINE

## 2024-11-07 PROCEDURE — 74177 CT ABD & PELVIS W/CONTRAST: CPT | Performed by: RADIOLOGY

## 2024-11-07 PROCEDURE — 87899 AGENT NOS ASSAY W/OPTIC: CPT | Mod: WESLAB | Performed by: INTERNAL MEDICINE

## 2024-11-07 PROCEDURE — 83880 ASSAY OF NATRIURETIC PEPTIDE: CPT | Performed by: PHYSICIAN ASSISTANT

## 2024-11-07 PROCEDURE — 0CJS8ZZ INSPECTION OF LARYNX, VIA NATURAL OR ARTIFICIAL OPENING ENDOSCOPIC: ICD-10-PCS | Performed by: THORACIC SURGERY (CARDIOTHORACIC VASCULAR SURGERY)

## 2024-11-07 PROCEDURE — 93005 ELECTROCARDIOGRAM TRACING: CPT

## 2024-11-07 PROCEDURE — 80053 COMPREHEN METABOLIC PANEL: CPT | Performed by: PHYSICIAN ASSISTANT

## 2024-11-07 PROCEDURE — 85025 COMPLETE CBC W/AUTO DIFF WBC: CPT | Performed by: PHYSICIAN ASSISTANT

## 2024-11-07 PROCEDURE — 71275 CT ANGIOGRAPHY CHEST: CPT | Performed by: RADIOLOGY

## 2024-11-07 PROCEDURE — 76705 ECHO EXAM OF ABDOMEN: CPT

## 2024-11-07 PROCEDURE — 96365 THER/PROPH/DIAG IV INF INIT: CPT

## 2024-11-07 PROCEDURE — 2500000004 HC RX 250 GENERAL PHARMACY W/ HCPCS (ALT 636 FOR OP/ED): Performed by: STUDENT IN AN ORGANIZED HEALTH CARE EDUCATION/TRAINING PROGRAM

## 2024-11-07 PROCEDURE — 2500000001 HC RX 250 WO HCPCS SELF ADMINISTERED DRUGS (ALT 637 FOR MEDICARE OP): Performed by: STUDENT IN AN ORGANIZED HEALTH CARE EDUCATION/TRAINING PROGRAM

## 2024-11-07 RX ORDER — METOPROLOL TARTRATE 25 MG/1
25 TABLET, FILM COATED ORAL
Status: DISCONTINUED | OUTPATIENT
Start: 2024-11-08 | End: 2024-11-10 | Stop reason: HOSPADM

## 2024-11-07 RX ORDER — HYDROCODONE BITARTRATE AND HOMATROPINE METHYLBROMIDE ORAL SOLUTION 5; 1.5 MG/5ML; MG/5ML
5 LIQUID ORAL EVERY 6 HOURS PRN
Status: DISCONTINUED | OUTPATIENT
Start: 2024-11-07 | End: 2024-11-07 | Stop reason: SDUPTHER

## 2024-11-07 RX ORDER — VANCOMYCIN 2 G/400ML
2 INJECTION, SOLUTION INTRAVENOUS ONCE
Status: COMPLETED | OUTPATIENT
Start: 2024-11-07 | End: 2024-11-08

## 2024-11-07 RX ORDER — HYDROXYZINE PAMOATE 25 MG/1
25 CAPSULE ORAL 3 TIMES DAILY PRN
Status: DISCONTINUED | OUTPATIENT
Start: 2024-11-07 | End: 2024-11-10

## 2024-11-07 RX ORDER — ALBUTEROL SULFATE 0.83 MG/ML
5 SOLUTION RESPIRATORY (INHALATION) EVERY 4 HOURS PRN
Status: DISCONTINUED | OUTPATIENT
Start: 2024-11-07 | End: 2024-11-10

## 2024-11-07 RX ORDER — VANCOMYCIN HYDROCHLORIDE 1 G/20ML
INJECTION, POWDER, LYOPHILIZED, FOR SOLUTION INTRAVENOUS DAILY PRN
Status: DISCONTINUED | OUTPATIENT
Start: 2024-11-07 | End: 2024-11-09

## 2024-11-07 RX ORDER — ASPIRIN 81 MG/1
81 TABLET ORAL EVERY OTHER DAY
Status: DISCONTINUED | OUTPATIENT
Start: 2024-11-08 | End: 2024-11-10 | Stop reason: HOSPADM

## 2024-11-07 RX ORDER — PAROXETINE 10 MG/1
10 TABLET, FILM COATED ORAL
Status: DISCONTINUED | OUTPATIENT
Start: 2024-11-08 | End: 2024-11-10 | Stop reason: HOSPADM

## 2024-11-07 RX ORDER — LEVOTHYROXINE SODIUM 25 UG/1
25 TABLET ORAL
Status: DISCONTINUED | OUTPATIENT
Start: 2024-11-08 | End: 2024-11-10 | Stop reason: HOSPADM

## 2024-11-07 RX ORDER — HYDROXYZINE PAMOATE 25 MG/1
25 CAPSULE ORAL ONCE
Status: COMPLETED | OUTPATIENT
Start: 2024-11-07 | End: 2024-11-07

## 2024-11-07 RX ORDER — HEPARIN SODIUM 5000 [USP'U]/ML
80 INJECTION, SOLUTION INTRAVENOUS; SUBCUTANEOUS ONCE
Status: COMPLETED | OUTPATIENT
Start: 2024-11-07 | End: 2024-11-07

## 2024-11-07 RX ORDER — VANCOMYCIN 1.5 G/300ML
1500 INJECTION, SOLUTION INTRAVENOUS EVERY 24 HOURS
Status: DISCONTINUED | OUTPATIENT
Start: 2024-11-08 | End: 2024-11-09

## 2024-11-07 RX ORDER — HEPARIN SODIUM 5000 [USP'U]/ML
3000-6000 INJECTION, SOLUTION INTRAVENOUS; SUBCUTANEOUS AS NEEDED
Status: DISCONTINUED | OUTPATIENT
Start: 2024-11-07 | End: 2024-11-10 | Stop reason: HOSPADM

## 2024-11-07 RX ORDER — ATORVASTATIN CALCIUM 80 MG/1
80 TABLET, FILM COATED ORAL DAILY
Status: DISCONTINUED | OUTPATIENT
Start: 2024-11-08 | End: 2024-11-10 | Stop reason: HOSPADM

## 2024-11-07 RX ORDER — GUAIFENESIN 600 MG/1
600 TABLET, EXTENDED RELEASE ORAL EVERY 12 HOURS PRN
Status: DISCONTINUED | OUTPATIENT
Start: 2024-11-07 | End: 2024-11-10

## 2024-11-07 RX ORDER — ALBUTEROL SULFATE 90 UG/1
2 INHALANT RESPIRATORY (INHALATION) EVERY 4 HOURS PRN
Status: DISCONTINUED | OUTPATIENT
Start: 2024-11-07 | End: 2024-11-07 | Stop reason: RX

## 2024-11-07 RX ORDER — SODIUM CHLORIDE 9 MG/ML
75 INJECTION, SOLUTION INTRAVENOUS CONTINUOUS
Status: DISCONTINUED | OUTPATIENT
Start: 2024-11-08 | End: 2024-11-08

## 2024-11-07 RX ORDER — IPRATROPIUM BROMIDE AND ALBUTEROL SULFATE 2.5; .5 MG/3ML; MG/3ML
3 SOLUTION RESPIRATORY (INHALATION) ONCE
Status: COMPLETED | OUTPATIENT
Start: 2024-11-07 | End: 2024-11-07

## 2024-11-07 RX ORDER — PANTOPRAZOLE SODIUM 40 MG/1
40 TABLET, DELAYED RELEASE ORAL
Status: DISCONTINUED | OUTPATIENT
Start: 2024-11-08 | End: 2024-11-10 | Stop reason: HOSPADM

## 2024-11-07 RX ORDER — HEPARIN SODIUM 10000 [USP'U]/100ML
0-4500 INJECTION, SOLUTION INTRAVENOUS CONTINUOUS
Status: DISCONTINUED | OUTPATIENT
Start: 2024-11-07 | End: 2024-11-10 | Stop reason: HOSPADM

## 2024-11-07 RX ORDER — GUAIFENESIN/DEXTROMETHORPHAN 100-10MG/5
5 SYRUP ORAL EVERY 4 HOURS PRN
Status: DISCONTINUED | OUTPATIENT
Start: 2024-11-07 | End: 2024-11-10

## 2024-11-07 SDOH — SOCIAL STABILITY: SOCIAL INSECURITY: ARE YOU OR HAVE YOU BEEN THREATENED OR ABUSED PHYSICALLY, EMOTIONALLY, OR SEXUALLY BY ANYONE?: NO

## 2024-11-07 SDOH — SOCIAL STABILITY: SOCIAL INSECURITY: WITHIN THE LAST YEAR, HAVE YOU BEEN AFRAID OF YOUR PARTNER OR EX-PARTNER?: NO

## 2024-11-07 SDOH — SOCIAL STABILITY: SOCIAL INSECURITY
WITHIN THE LAST YEAR, HAVE YOU BEEN RAPED OR FORCED TO HAVE ANY KIND OF SEXUAL ACTIVITY BY YOUR PARTNER OR EX-PARTNER?: NO

## 2024-11-07 SDOH — SOCIAL STABILITY: SOCIAL INSECURITY: HAVE YOU HAD THOUGHTS OF HARMING ANYONE ELSE?: NO

## 2024-11-07 SDOH — SOCIAL STABILITY: SOCIAL INSECURITY: DO YOU FEEL UNSAFE GOING BACK TO THE PLACE WHERE YOU ARE LIVING?: NO

## 2024-11-07 SDOH — SOCIAL STABILITY: SOCIAL INSECURITY: HAVE YOU HAD ANY THOUGHTS OF HARMING ANYONE ELSE?: NO

## 2024-11-07 SDOH — SOCIAL STABILITY: SOCIAL INSECURITY: WITHIN THE LAST YEAR, HAVE YOU BEEN HUMILIATED OR EMOTIONALLY ABUSED IN OTHER WAYS BY YOUR PARTNER OR EX-PARTNER?: NO

## 2024-11-07 SDOH — ECONOMIC STABILITY: FOOD INSECURITY: WITHIN THE PAST 12 MONTHS, YOU WORRIED THAT YOUR FOOD WOULD RUN OUT BEFORE YOU GOT THE MONEY TO BUY MORE.: NEVER TRUE

## 2024-11-07 SDOH — ECONOMIC STABILITY: INCOME INSECURITY: IN THE PAST 12 MONTHS HAS THE ELECTRIC, GAS, OIL, OR WATER COMPANY THREATENED TO SHUT OFF SERVICES IN YOUR HOME?: NO

## 2024-11-07 SDOH — SOCIAL STABILITY: SOCIAL INSECURITY: WERE YOU ABLE TO COMPLETE ALL THE BEHAVIORAL HEALTH SCREENINGS?: YES

## 2024-11-07 SDOH — SOCIAL STABILITY: SOCIAL INSECURITY: ABUSE: ADULT

## 2024-11-07 SDOH — ECONOMIC STABILITY: FOOD INSECURITY: WITHIN THE PAST 12 MONTHS, THE FOOD YOU BOUGHT JUST DIDN'T LAST AND YOU DIDN'T HAVE MONEY TO GET MORE.: NEVER TRUE

## 2024-11-07 SDOH — SOCIAL STABILITY: SOCIAL INSECURITY
WITHIN THE LAST YEAR, HAVE YOU BEEN KICKED, HIT, SLAPPED, OR OTHERWISE PHYSICALLY HURT BY YOUR PARTNER OR EX-PARTNER?: NO

## 2024-11-07 SDOH — SOCIAL STABILITY: SOCIAL INSECURITY: DO YOU FEEL ANYONE HAS EXPLOITED OR TAKEN ADVANTAGE OF YOU FINANCIALLY OR OF YOUR PERSONAL PROPERTY?: NO

## 2024-11-07 SDOH — SOCIAL STABILITY: SOCIAL INSECURITY: DOES ANYONE TRY TO KEEP YOU FROM HAVING/CONTACTING OTHER FRIENDS OR DOING THINGS OUTSIDE YOUR HOME?: NO

## 2024-11-07 SDOH — SOCIAL STABILITY: SOCIAL INSECURITY: HAS ANYONE EVER THREATENED TO HURT YOUR FAMILY OR YOUR PETS?: NO

## 2024-11-07 SDOH — SOCIAL STABILITY: SOCIAL INSECURITY: ARE THERE ANY APPARENT SIGNS OF INJURIES/BEHAVIORS THAT COULD BE RELATED TO ABUSE/NEGLECT?: NO

## 2024-11-07 ASSESSMENT — PAIN SCALES - GENERAL
PAINLEVEL_OUTOF10: 0 - NO PAIN
PAINLEVEL_OUTOF10: 0 - NO PAIN

## 2024-11-07 ASSESSMENT — COGNITIVE AND FUNCTIONAL STATUS - GENERAL
PATIENT BASELINE BEDBOUND: NO
DAILY ACTIVITIY SCORE: 24
MOBILITY SCORE: 24

## 2024-11-07 ASSESSMENT — LIFESTYLE VARIABLES
PRESCIPTION_ABUSE_PAST_12_MONTHS: NO
SUBSTANCE_ABUSE_PAST_12_MONTHS: NO
HOW OFTEN DO YOU HAVE 6 OR MORE DRINKS ON ONE OCCASION: NEVER
HOW MANY STANDARD DRINKS CONTAINING ALCOHOL DO YOU HAVE ON A TYPICAL DAY: 3 OR 4
HOW OFTEN DO YOU HAVE A DRINK CONTAINING ALCOHOL: MONTHLY OR LESS
SKIP TO QUESTIONS 9-10: 0
AUDIT-C TOTAL SCORE: 2
AUDIT-C TOTAL SCORE: 2

## 2024-11-07 ASSESSMENT — PAIN - FUNCTIONAL ASSESSMENT: PAIN_FUNCTIONAL_ASSESSMENT: 0-10

## 2024-11-07 ASSESSMENT — COLUMBIA-SUICIDE SEVERITY RATING SCALE - C-SSRS
6. HAVE YOU EVER DONE ANYTHING, STARTED TO DO ANYTHING, OR PREPARED TO DO ANYTHING TO END YOUR LIFE?: NO
2. HAVE YOU ACTUALLY HAD ANY THOUGHTS OF KILLING YOURSELF?: NO
1. IN THE PAST MONTH, HAVE YOU WISHED YOU WERE DEAD OR WISHED YOU COULD GO TO SLEEP AND NOT WAKE UP?: NO

## 2024-11-07 ASSESSMENT — ACTIVITIES OF DAILY LIVING (ADL)
LACK_OF_TRANSPORTATION: NO
FEEDING YOURSELF: INDEPENDENT
BATHING: INDEPENDENT
HEARING - LEFT EAR: FUNCTIONAL
GROOMING: INDEPENDENT
TOILETING: INDEPENDENT
JUDGMENT_ADEQUATE_SAFELY_COMPLETE_DAILY_ACTIVITIES: YES
WALKS IN HOME: INDEPENDENT
HEARING - RIGHT EAR: FUNCTIONAL
PATIENT'S MEMORY ADEQUATE TO SAFELY COMPLETE DAILY ACTIVITIES?: YES
ADEQUATE_TO_COMPLETE_ADL: YES
DRESSING YOURSELF: INDEPENDENT

## 2024-11-07 ASSESSMENT — PATIENT HEALTH QUESTIONNAIRE - PHQ9
2. FEELING DOWN, DEPRESSED OR HOPELESS: NOT AT ALL
1. LITTLE INTEREST OR PLEASURE IN DOING THINGS: NOT AT ALL
SUM OF ALL RESPONSES TO PHQ9 QUESTIONS 1 & 2: 0

## 2024-11-07 ASSESSMENT — PAIN DESCRIPTION - PROGRESSION: CLINICAL_PROGRESSION: NOT CHANGED

## 2024-11-07 NOTE — TELEPHONE ENCOUNTER
Spoke to [facility] - she is getting the xray sent out for a stat read. Can you advise on second half of message?

## 2024-11-07 NOTE — TELEPHONE ENCOUNTER
PT wife calling stating that PT had virtual appointment 11/4/2024 with Dr. Montague. Xray ordered and preformed at [facility], but results still not available in PT's chart. PT's wife states that she has contacted the hospital herself to request the results, but was told that the doctor's office would have to call and try to request the results sooner.    PT wife also stating that PT told Dr. Montague that he did not have a fever as he forgot, but he has had a fever for a few days. Today was 101.3.  PT wife requesting a return call to discuss what PT should do. Please call wife Trista as PT is trying to sleep/rest. [phone]

## 2024-11-07 NOTE — TELEPHONE ENCOUNTER
PT wife calling in stating they did receive xray results via [employer]. PT wife states results seem to be worse than before, PT has a fever and has no appetite and will not eat, he is drinking water.

## 2024-11-07 NOTE — TELEPHONE ENCOUNTER
Meir Montague MD  11/7/2024  1:30 PM EST Back to Top      Pt has a minimal amount of WBC's in her urine. I need to await her urine cx results to determine if she has a UTI. Other labs are normal thus far.

## 2024-11-08 ENCOUNTER — APPOINTMENT (OUTPATIENT)
Dept: CARDIOLOGY | Facility: HOSPITAL | Age: 59
End: 2024-11-08
Payer: MEDICARE

## 2024-11-08 ENCOUNTER — APPOINTMENT (OUTPATIENT)
Dept: RADIOLOGY | Facility: HOSPITAL | Age: 59
DRG: 003 | End: 2024-11-08
Payer: MEDICARE

## 2024-11-08 LAB
AFP SERPL-MCNC: <4 NG/ML (ref 0–9)
ALBUMIN SERPL BCP-MCNC: 2.9 G/DL (ref 3.4–5)
ALP SERPL-CCNC: 222 U/L (ref 33–120)
ALT SERPL W P-5'-P-CCNC: 14 U/L (ref 10–52)
ANION GAP SERPL CALCULATED.3IONS-SCNC: 15 MMOL/L
AORTIC VALVE MEAN GRADIENT: 9 MMHG
AORTIC VALVE PEAK VELOCITY: 2.09 M/S
APTT PPP: 55 SECONDS (ref 22–32.5)
APTT PPP: 76.3 SECONDS (ref 22–32.5)
AST SERPL W P-5'-P-CCNC: 28 U/L (ref 9–39)
ATRIAL RATE: 88 BPM
AV PEAK GRADIENT: 17 MMHG
BASOPHILS # BLD AUTO: 0.1 X10*3/UL (ref 0–0.1)
BASOPHILS NFR BLD AUTO: 0.6 %
BILIRUB SERPL-MCNC: 2.5 MG/DL (ref 0–1.2)
BUN SERPL-MCNC: 28 MG/DL (ref 6–23)
CALCIUM SERPL-MCNC: 8.3 MG/DL (ref 8.6–10.3)
CANCER AG19-9 SERPL-ACNC: 26.97 U/ML
CEA SERPL-MCNC: <0.5 UG/L
CHLORIDE SERPL-SCNC: 92 MMOL/L (ref 98–107)
CO2 SERPL-SCNC: 28 MMOL/L (ref 21–32)
CREAT SERPL-MCNC: 1.2 MG/DL (ref 0.5–1.3)
EGFRCR SERPLBLD CKD-EPI 2021: 70 ML/MIN/1.73M*2
EJECTION FRACTION: 53 %
EOSINOPHIL # BLD AUTO: 0.06 X10*3/UL (ref 0–0.7)
EOSINOPHIL NFR BLD AUTO: 0.3 %
ERYTHROCYTE [DISTWIDTH] IN BLOOD BY AUTOMATED COUNT: 14.8 % (ref 11.5–14.5)
FLUAV RNA RESP QL NAA+PROBE: NOT DETECTED
FLUBV RNA RESP QL NAA+PROBE: NOT DETECTED
GLUCOSE SERPL-MCNC: 93 MG/DL (ref 74–99)
HCT VFR BLD AUTO: 34.7 % (ref 41–52)
HGB BLD-MCNC: 12.5 G/DL (ref 13.5–17.5)
HOLD SPECIMEN: NORMAL
IMM GRANULOCYTES # BLD AUTO: 0.24 X10*3/UL (ref 0–0.7)
IMM GRANULOCYTES NFR BLD AUTO: 1.4 % (ref 0–0.9)
INR PPP: 1.3 (ref 0.9–1.2)
LEFT ATRIUM VOLUME AREA LENGTH INDEX BSA: 48.3 ML/M2
LEFT VENTRICLE INTERNAL DIMENSION DIASTOLE: 3.92 CM (ref 3.5–6)
LYMPHOCYTES # BLD AUTO: 1.94 X10*3/UL (ref 1.2–4.8)
LYMPHOCYTES NFR BLD AUTO: 11.1 %
MCH RBC QN AUTO: 32.7 PG (ref 26–34)
MCHC RBC AUTO-ENTMCNC: 36 G/DL (ref 32–36)
MCV RBC AUTO: 91 FL (ref 80–100)
MITRAL VALVE E/A RATIO: 3.59
MITRAL VALVE E/E' RATIO: 30.78
MONOCYTES # BLD AUTO: 2.26 X10*3/UL (ref 0.1–1)
MONOCYTES NFR BLD AUTO: 12.9 %
MRSA DNA SPEC QL NAA+PROBE: NOT DETECTED
NEUTROPHILS # BLD AUTO: 12.9 X10*3/UL (ref 1.2–7.7)
NEUTROPHILS NFR BLD AUTO: 73.7 %
NRBC BLD-RTO: 0.1 /100 WBCS (ref 0–0)
P AXIS: 29 DEGREES
P OFFSET: 141 MS
P ONSET: 115 MS
PLATELET # BLD AUTO: 325 X10*3/UL (ref 150–450)
POTASSIUM SERPL-SCNC: 2.8 MMOL/L (ref 3.5–5.3)
PR INTERVAL: 190 MS
PREALB SERPL-MCNC: 5.9 MG/DL (ref 18–40)
PROT SERPL-MCNC: 6.6 G/DL (ref 6.4–8.2)
PROTHROMBIN TIME: 13.9 SECONDS (ref 9.3–12.7)
PSA SERPL-MCNC: 2.3 NG/ML
Q ONSET: 210 MS
QRS COUNT: 14 BEATS
QRS DURATION: 108 MS
QT INTERVAL: 402 MS
QTC CALCULATION(BAZETT): 486 MS
QTC FREDERICIA: 456 MS
R AXIS: 157 DEGREES
RBC # BLD AUTO: 3.82 X10*6/UL (ref 4.5–5.9)
RIGHT VENTRICLE FREE WALL PEAK S': 3.53 CM/S
RIGHT VENTRICLE PEAK SYSTOLIC PRESSURE: 41.5 MMHG
SARS-COV-2 RNA RESP QL NAA+PROBE: NOT DETECTED
SODIUM SERPL-SCNC: 132 MMOL/L (ref 136–145)
T AXIS: 41 DEGREES
T OFFSET: 411 MS
VANCOMYCIN SERPL-MCNC: 35.9 UG/ML (ref 5–20)
VENTRICULAR RATE: 88 BPM
WBC # BLD AUTO: 17.5 X10*3/UL (ref 4.4–11.3)

## 2024-11-08 PROCEDURE — 99223 1ST HOSP IP/OBS HIGH 75: CPT | Performed by: INTERNAL MEDICINE

## 2024-11-08 PROCEDURE — 2500000004 HC RX 250 GENERAL PHARMACY W/ HCPCS (ALT 636 FOR OP/ED): Performed by: INTERNAL MEDICINE

## 2024-11-08 PROCEDURE — 3430000001 HC RX 343 DIAGNOSTIC RADIOPHARMACEUTICALS: Performed by: INTERNAL MEDICINE

## 2024-11-08 PROCEDURE — 2500000002 HC RX 250 W HCPCS SELF ADMINISTERED DRUGS (ALT 637 FOR MEDICARE OP, ALT 636 FOR OP/ED): Performed by: INTERNAL MEDICINE

## 2024-11-08 PROCEDURE — 99222 1ST HOSP IP/OBS MODERATE 55: CPT | Performed by: INTERNAL MEDICINE

## 2024-11-08 PROCEDURE — 87636 SARSCOV2 & INF A&B AMP PRB: CPT | Performed by: INTERNAL MEDICINE

## 2024-11-08 PROCEDURE — 78306 BONE IMAGING WHOLE BODY: CPT

## 2024-11-08 PROCEDURE — 84153 ASSAY OF PSA TOTAL: CPT | Mod: WESLAB | Performed by: INTERNAL MEDICINE

## 2024-11-08 PROCEDURE — 85730 THROMBOPLASTIN TIME PARTIAL: CPT | Performed by: PHYSICIAN ASSISTANT

## 2024-11-08 PROCEDURE — 85610 PROTHROMBIN TIME: CPT | Performed by: INTERNAL MEDICINE

## 2024-11-08 PROCEDURE — 2500000001 HC RX 250 WO HCPCS SELF ADMINISTERED DRUGS (ALT 637 FOR MEDICARE OP): Performed by: INTERNAL MEDICINE

## 2024-11-08 PROCEDURE — 87205 SMEAR GRAM STAIN: CPT | Mod: WESLAB | Performed by: INTERNAL MEDICINE

## 2024-11-08 PROCEDURE — 86301 IMMUNOASSAY TUMOR CA 19-9: CPT | Mod: WESLAB | Performed by: INTERNAL MEDICINE

## 2024-11-08 PROCEDURE — 78306 BONE IMAGING WHOLE BODY: CPT | Performed by: NUCLEAR MEDICINE

## 2024-11-08 PROCEDURE — 2500000004 HC RX 250 GENERAL PHARMACY W/ HCPCS (ALT 636 FOR OP/ED): Performed by: PHYSICIAN ASSISTANT

## 2024-11-08 PROCEDURE — 36415 COLL VENOUS BLD VENIPUNCTURE: CPT | Performed by: INTERNAL MEDICINE

## 2024-11-08 PROCEDURE — 80202 ASSAY OF VANCOMYCIN: CPT

## 2024-11-08 PROCEDURE — 36415 COLL VENOUS BLD VENIPUNCTURE: CPT | Performed by: PHYSICIAN ASSISTANT

## 2024-11-08 PROCEDURE — 87070 CULTURE OTHR SPECIMN AEROBIC: CPT | Mod: WESLAB | Performed by: INTERNAL MEDICINE

## 2024-11-08 PROCEDURE — 82378 CARCINOEMBRYONIC ANTIGEN: CPT | Mod: WESLAB | Performed by: INTERNAL MEDICINE

## 2024-11-08 PROCEDURE — A9503 TC99M MEDRONATE: HCPCS | Performed by: INTERNAL MEDICINE

## 2024-11-08 PROCEDURE — 2500000004 HC RX 250 GENERAL PHARMACY W/ HCPCS (ALT 636 FOR OP/ED): Mod: JZ

## 2024-11-08 PROCEDURE — 87641 MR-STAPH DNA AMP PROBE: CPT | Performed by: PHYSICIAN ASSISTANT

## 2024-11-08 PROCEDURE — 2060000001 HC INTERMEDIATE ICU ROOM DAILY

## 2024-11-08 PROCEDURE — 85025 COMPLETE CBC W/AUTO DIFF WBC: CPT | Performed by: INTERNAL MEDICINE

## 2024-11-08 PROCEDURE — 82105 ALPHA-FETOPROTEIN SERUM: CPT | Mod: WESLAB | Performed by: INTERNAL MEDICINE

## 2024-11-08 PROCEDURE — 84134 ASSAY OF PREALBUMIN: CPT | Mod: WESLAB | Performed by: INTERNAL MEDICINE

## 2024-11-08 PROCEDURE — 87635 SARS-COV-2 COVID-19 AMP PRB: CPT | Performed by: INTERNAL MEDICINE

## 2024-11-08 PROCEDURE — 99221 1ST HOSP IP/OBS SF/LOW 40: CPT | Performed by: PHYSICIAN ASSISTANT

## 2024-11-08 PROCEDURE — 84075 ASSAY ALKALINE PHOSPHATASE: CPT | Performed by: INTERNAL MEDICINE

## 2024-11-08 PROCEDURE — 93306 TTE W/DOPPLER COMPLETE: CPT

## 2024-11-08 PROCEDURE — 93306 TTE W/DOPPLER COMPLETE: CPT | Performed by: INTERNAL MEDICINE

## 2024-11-08 RX ORDER — POTASSIUM CHLORIDE 20 MEQ/1
40 TABLET, EXTENDED RELEASE ORAL ONCE
Status: COMPLETED | OUTPATIENT
Start: 2024-11-08 | End: 2024-11-08

## 2024-11-08 RX ORDER — ACETAMINOPHEN 325 MG/1
650 TABLET ORAL EVERY 6 HOURS PRN
Status: DISCONTINUED | OUTPATIENT
Start: 2024-11-08 | End: 2024-11-10 | Stop reason: HOSPADM

## 2024-11-08 RX ORDER — POTASSIUM CHLORIDE 14.9 MG/ML
20 INJECTION INTRAVENOUS ONCE
Status: DISCONTINUED | OUTPATIENT
Start: 2024-11-08 | End: 2024-11-10

## 2024-11-08 RX ORDER — IPRATROPIUM BROMIDE AND ALBUTEROL SULFATE 2.5; .5 MG/3ML; MG/3ML
3 SOLUTION RESPIRATORY (INHALATION) EVERY 8 HOURS PRN
Status: DISCONTINUED | OUTPATIENT
Start: 2024-11-08 | End: 2024-11-08

## 2024-11-08 RX ORDER — SODIUM CHLORIDE AND POTASSIUM CHLORIDE 150; 900 MG/100ML; MG/100ML
75 INJECTION, SOLUTION INTRAVENOUS CONTINUOUS
Status: DISCONTINUED | OUTPATIENT
Start: 2024-11-08 | End: 2024-11-08

## 2024-11-08 RX ORDER — POTASSIUM CHLORIDE 1.5 G/1.58G
40 POWDER, FOR SOLUTION ORAL ONCE
Status: COMPLETED | OUTPATIENT
Start: 2024-11-08 | End: 2024-11-08

## 2024-11-08 RX ORDER — POTASSIUM CHLORIDE 20 MEQ/1
40 TABLET, EXTENDED RELEASE ORAL 2 TIMES DAILY
Status: DISCONTINUED | OUTPATIENT
Start: 2024-11-08 | End: 2024-11-08

## 2024-11-08 RX ORDER — SODIUM CHLORIDE 9 MG/ML
75 INJECTION, SOLUTION INTRAVENOUS CONTINUOUS
Status: ACTIVE | OUTPATIENT
Start: 2024-11-08 | End: 2024-11-08

## 2024-11-08 ASSESSMENT — COGNITIVE AND FUNCTIONAL STATUS - GENERAL
DRESSING REGULAR UPPER BODY CLOTHING: A LITTLE
MOBILITY SCORE: 19
WALKING IN HOSPITAL ROOM: A LITTLE
DAILY ACTIVITIY SCORE: 19
DRESSING REGULAR LOWER BODY CLOTHING: A LITTLE
TOILETING: A LITTLE
EATING MEALS: A LITTLE
MOBILITY SCORE: 21
DRESSING REGULAR LOWER BODY CLOTHING: A LITTLE
MOVING TO AND FROM BED TO CHAIR: A LITTLE
DAILY ACTIVITIY SCORE: 18
STANDING UP FROM CHAIR USING ARMS: A LITTLE
HELP NEEDED FOR BATHING: A LITTLE
DRESSING REGULAR UPPER BODY CLOTHING: A LITTLE
CLIMB 3 TO 5 STEPS WITH RAILING: A LOT
TOILETING: A LITTLE
STANDING UP FROM CHAIR USING ARMS: A LITTLE
WALKING IN HOSPITAL ROOM: A LITTLE
PERSONAL GROOMING: A LITTLE
PERSONAL GROOMING: A LITTLE
HELP NEEDED FOR BATHING: A LITTLE
CLIMB 3 TO 5 STEPS WITH RAILING: A LITTLE

## 2024-11-08 ASSESSMENT — PAIN SCALES - GENERAL
PAINLEVEL_OUTOF10: 0 - NO PAIN
PAINLEVEL_OUTOF10: 0 - NO PAIN

## 2024-11-08 ASSESSMENT — PAIN - FUNCTIONAL ASSESSMENT: PAIN_FUNCTIONAL_ASSESSMENT: 0-10

## 2024-11-08 NOTE — PROGRESS NOTES
Carl Vazquez is a 59 y.o. male on day 1 of admission presenting with Acute pulmonary embolism with acute cor pulmonale (Multi).      Subjective   Patient seen and examined in bed. Wife at bedside.   Coughing throughout exam and history.       Objective     Last Recorded Vitals  BP 93/60 (BP Location: Left arm, Patient Position: Lying)   Pulse 94   Temp 36.1 °C (97 °F) (Temporal)   Resp 17   Wt 80.2 kg (176 lb 12.9 oz)   SpO2 96%   Intake/Output last 3 Shifts:    Intake/Output Summary (Last 24 hours) at 11/8/2024 1632  Last data filed at 11/8/2024 0528  Gross per 24 hour   Intake 1749 ml   Output 740 ml   Net 1009 ml       Admission Weight  Weight: 79.4 kg (175 lb) (11/07/24 1551)    Daily Weight  11/08/24 : 80.2 kg (176 lb 12.9 oz)    Image Results  NM bone whole body  Narrative: Interpreted By:  Jerry Upton and Ogievich Taessa   STUDY:  NM BONE WHOLE BODY;  11/8/2024 1:57 pm      INDICATION:  Signs/Symptoms:rule out bone metastases.      Per EMR: Was admitted with fatigue, malaise, and productive cough. CT  with pneumonia and possible lung mass.      COMPARISON:  CTA chest 11/07/2020      ACCESSION NUMBER(S):  [id number]      ORDERING CLINICIAN:  ASPEN SEN      TECHNIQUE:  DIVISION OF NUCLEAR MEDICINE  BONE SCAN, WHOLE BODY plus REGIONAL VIEWS      The patient received an intravenous dose of  26.7 mCi of Tc-99m MDP.  Anterior and posterior images of the skeleton from skull vertex to  feet were then acquired.  Additional regional skeletal images were  also obtained.      FINDINGS:  No foci with decreased radiotracer uptake to suggest osseous  metastatic disease.      Mild increased radiotracer uptake in the right 8th posterior rib  which correlates with a healed fracture deformity on CT dated  11/07/2024.      Expected, excreted activity is noted in the kidneys and bladder.      Increased radiotracer uptake is seen in the bilateral shoulders,  knees, and feet, most consistent with an arthritic  pattern.      Impression: No evidence of osseous metastatic disease.      I personally reviewed the images/study and I agree with the findings  as stated by Ruthann Wang DO, PGY-3. This study was interpreted  at [facility], [place], [place].      MACRO:  None      Signed by: Jerry Alexsandra 11/8/2024 4:05 PM  Dictation workstation:   [id number]  ECG 12 lead  Normal sinus rhythm  Right ventricular hypertrophy  Nonspecific ST and T wave abnormality  Prolonged QT  Abnormal ECG  Confirmed by Andrea Hong ([id number]) on 11/8/2024 8:41:12 AM      Physical Exam  Constitutional:       Comments: Ill appearing   HENT:      Head: Normocephalic and atraumatic.   Eyes:      Extraocular Movements: Extraocular movements intact.      Pupils: Pupils are equal, round, and reactive to light.   Cardiovascular:      Rate and Rhythm: Normal rate and regular rhythm.   Pulmonary:      Breath sounds: No wheezing or rales.      Comments: Diminished BS throughout >> Right  Musculoskeletal:         General: Normal range of motion.      Cervical back: Normal range of motion and neck supple.      Right lower leg: No edema.      Left lower leg: No edema.   Skin:     General: Skin is warm and dry.   Neurological:      General: No focal deficit present.      Mental Status: He is alert and oriented to person, place, and time.   Psychiatric:         Mood and Affect: Mood normal.         Behavior: Behavior normal.         Relevant Results               Assessment/Plan    58yo male presenting with fatigue and persistent cough since October 7 per patient.  Was treated outpatient for PNA with doxycycline.  Improved for a few days then fatigue and SOB returned. Treated with Z-pack outpatient by PCP. Then sent to ED for further evaluation when no improvement.  ON ED evaluation found to have PE, RLL PNA and Lung Mass, NSTEMI.  Admitted for further w/u.              Assessment & Plan  Acute pulmonary embolism with acute cor  pulmonale (Multi)  -Heparin drip  -Pulmonary consult  -Planned for bronchoscopy on Monday    Pneumonia of right lower lobe due to infectious organism  -Vanco discontinued  -Continue Zosyn  -follow up sputum culture results    Acute renal failure (ARF) (CMS-HCC)  -IV fluid hydration  -Hold home medication: metformin  -Renal consult if no improvement    Aortic valve disorder  -has bioprosthetic valve  -Cardiology consult    Hodgkin lymphoma  -in remission  -s/p chemoradiation in the '80s  Lung mass  -Thorasic surgery consulted, follow up recommendations  Type 2 diabetes mellitus without complication (Multi)  -resume home medications  -SSI coverage  -check A1C  NSTEMI (non-ST elevated myocardial infarction) (Multi)  -no complaint of chest pain  -trend troponins  -follow up cardiology consult recommendations                  Arlyn Gomes MD

## 2024-11-08 NOTE — ED PROVIDER NOTES
HPI   Chief Complaint   Patient presents with    Pneumonia     Patient diagnosed with pneumonia and has not gotten any better despite being on doxy and zpac. Can not be seen by pulmonologist until December. Primary doctor prescibed zpak       59-year-old male presented emergency department with a chief complaint of shortness of breath that has been ongoing for the last month.  He was seen in the emergency department.  He has completed 2 courses of antibiotics doxycycline and Zithromax.  He initially felt better but is now feeling worse.  He is not hypoxic.  He is nontoxic appearing.  He is ambulatory.  He denies lightheadedness dizziness.  No other complaint.              Patient History   Past Medical History:   Diagnosis Date    Aortic valve disorder 01/06/2015    Basal cell carcinoma 01/22/2024    Chest pain 01/22/2024    Chronic systolic heart failure 01/22/2024    Coronary arteriosclerosis 01/22/2024    Essential hypertension 01/22/2024    Hodgkin lymphoma 01/22/2024    Hx of replacement of aortic valve 01/22/2024    Mixed hyperlipidemia 02/23/2005    Pleural effusion 01/22/2024    Shortness of breath 01/22/2024    Type 2 diabetes mellitus without complication (Multi) 08/02/2023    Venous thrombosis 01/22/2024     Past Surgical History:   Procedure Laterality Date    CT GUIDED CHEST TUBE PLACEMENT  7/25/2018    CT GUIDED CHEST TUBE PLACEMENT [facility]     Family History   Problem Relation Name Age of Onset    No Known Problems Mother      No Known Problems Father       Social History     Tobacco Use    Smoking status: Never     Passive exposure: Never    Smokeless tobacco: Never   Substance Use Topics    Alcohol use: Yes     Alcohol/week: 6.0 standard drinks of alcohol     Types: 6 Cans of beer per week    Drug use: Never       Physical Exam   ED Triage Vitals [11/07/24 1551]   Temp Heart Rate Resp BP   36.8 °C (98.2 °F) 90 18 101/61      SpO2 Temp Source Heart Rate Source Patient Position   95 %  Temporal Monitor Sitting      BP Location FiO2 (%)     Left arm --       Physical Exam  Vitals and nursing note reviewed.   Constitutional:       Appearance: Normal appearance.   HENT:      Head: Normocephalic.      Nose: Nose normal.      Mouth/Throat:      Mouth: Mucous membranes are moist.   Cardiovascular:      Rate and Rhythm: Normal rate.   Pulmonary:      Effort: Pulmonary effort is normal.   Abdominal:      General: Abdomen is flat.   Musculoskeletal:         General: Normal range of motion.      Cervical back: Normal range of motion.   Skin:     General: Skin is warm.   Neurological:      General: No focal deficit present.      Mental Status: He is alert and oriented to person, place, and time.   Psychiatric:         Mood and Affect: Mood normal.           ED Course & MDM   ED Course as of 11/07/24 2328   Thu Nov 07, 2024   1602 ECG 12 lead  Performed at  1556, HR of 88, NSR, NAD, QTc 486, no sign of STEMI, no Q wave or T wave abnormality noted.    Reviewed and interpreted by me at time performed   [JM]      ED Course User Index  [JM] Brittany Leiva MD         Diagnoses as of 11/07/24 2328   Other acute pulmonary embolism without acute cor pulmonale   Empyema (Multi)   Pneumonia of right lower lobe due to infectious organism                 No data recorded     Jamey Coma Scale Score: 15 (11/07/24 2225 : Frances Marrero RN)                           Medical Decision Making  I have seen and evaluated this patient.  The attending physician has also seen and evaluated this patient.  Vital signs, laboratory testing and diagnostic images if applicable have been reviewed.  All laboratory and imaging is interpreted by myself unless otherwise stated.  Radiology studies are also formally interpreted by radiologist.    Patient with 70,000 leukocytosis no significant anemia metabolic panel without shelley renal impairment, CT angiogram demonstrates pulmonary embolism concern for empyema, pulmonology  consulted.  Antibiotics initiated, blood cultures are sent.  Anticoagulation initiated.  The patient does not have oxygen requirement at this point in time.  He is admitted for further treatment and management.    I have seen and evaluated this patient and independently provided 31 minutes of nonconcurrent critical care time. This does not include separately billable procedures. Patient with high potential for deterioration, required frequent monitoring and assessment.    Labs Reviewed  CBC WITH AUTO DIFFERENTIAL - Abnormal     WBC                           17.2 (*)               nRBC                          0.1 (*)                RBC                           4.07 (*)               Hemoglobin                    13.1 (*)               Hematocrit                    38.0 (*)               MCV                           93                     MCH                           32.2                   MCHC                          34.5                   RDW                           14.8 (*)               Platelets                     344                    Neutrophils %                 72.3                   Immature Granulocytes %, Automated   1.5 (*)                Lymphocytes %                 10.9                   Monocytes %                   14.5                   Eosinophils %                 0.2                    Basophils %                   0.6                    Neutrophils Absolute          12.47 (*)               Immature Granulocytes Absolute, Au*   0.25                   Lymphocytes Absolute          1.87                   Monocytes Absolute            2.50 (*)               Eosinophils Absolute          0.03                   Basophils Absolute            0.10                COMPREHENSIVE METABOLIC PANEL - Abnormal     Glucose                       139 (*)                Sodium                        133 (*)                Potassium                     3.5                    Chloride                      91  (*)                 Bicarbonate                   33 (*)                 Anion Gap                     13                     Urea Nitrogen                 30 (*)                 Creatinine                    1.41 (*)               eGFR                          57 (*)                 Calcium                       9.0                    Albumin                       3.0 (*)                Alkaline Phosphatase          259 (*)                Total Protein                 7.1                    AST                           22                     Bilirubin, Total              2.7 (*)                ALT                           12                  B-TYPE NATRIURETIC PEPTIDE - Abnormal     BNP                           1,095 (*)                 Narrative:    <100 pg/mL - Heart failure unlikely                100-299 pg/mL - Intermediate probability of acute heart                                failure exacerbation. Correlate with clinical                                context and patient history.                  >=300 pg/mL - Heart Failure likely. Correlate with clinical                                context and patient history.                                BNP testing is performed using different testing methodology at [facility] than at other [facility]. Direct result comparisons should only be made within the same method.                   SERIAL TROPONIN-INITIAL - Abnormal     Troponin I, High Sensitivity   182 (*)                  Narrative: Less than 99th percentile of normal range cutoff-                Female and children under 18 years old <14 ng/L; Male <21 ng/L: Negative                Repeat testing should be performed if clinically indicated.                                 Female and children under 18 years old 14-50 ng/L; Male 21-50 ng/L:                Consistent with possible cardiac damage and possible increased clinical                 risk. Serial measurements may help to  assess extent of myocardial damage.                                 >50 ng/L: Consistent with cardiac damage, increased clinical risk and                myocardial infarction. Serial measurements may help assess extent of                 myocardial damage.                                  NOTE: Children less than 1 year old may have higher baseline troponin                 levels and results should be interpreted in conjunction with the overall                 clinical context.                                 NOTE: Troponin I testing is performed using a different                 testing methodology at [facility] than at other                 [facility]. Direct result comparisons should only                 be made within the same method.  SERIAL TROPONIN, 1 HOUR - Abnormal     Troponin I, High Sensitivity   151 (*)                  Narrative: Less than 99th percentile of normal range cutoff-                Female and children under 18 years old <14 ng/L; Male <21 ng/L: Negative                Repeat testing should be performed if clinically indicated.                                 Female and children under 18 years old 14-50 ng/L; Male 21-50 ng/L:                Consistent with possible cardiac damage and possible increased clinical                 risk. Serial measurements may help to assess extent of myocardial damage.                                 >50 ng/L: Consistent with cardiac damage, increased clinical risk and                myocardial infarction. Serial measurements may help assess extent of                 myocardial damage.                                  NOTE: Children less than 1 year old may have higher baseline troponin                 levels and results should be interpreted in conjunction with the overall                 clinical context.                                 NOTE: Troponin I testing is performed using a different                 testing methodology at [facility] than at other                 system hospitals. Direct result comparisons should only                 be made within the same method.  APTT - Abnormal     aPTT                          34.2 (*)            CBC - Abnormal     WBC                           18.0 (*)               nRBC                          0.0                    RBC                           4.04 (*)               Hemoglobin                    13.0 (*)               Hematocrit                    37.7 (*)               MCV                           93                     MCH                           32.2                   MCHC                          34.5                   RDW                           14.9 (*)               Platelets                     352                 CBC WITH AUTO DIFFERENTIAL - Abnormal     WBC                           17.5 (*)               nRBC                          0.1 (*)                RBC                           3.82 (*)               Hemoglobin                    12.5 (*)               Hematocrit                    34.7 (*)               MCV                           91                     MCH                           32.7                   MCHC                          36.0                   RDW                           14.8 (*)               Platelets                     325                    Neutrophils %                 73.7                   Immature Granulocytes %, Automated   1.4 (*)                Lymphocytes %                 11.1                   Monocytes %                   12.9                   Eosinophils %                 0.3                    Basophils %                   0.6                    Neutrophils Absolute          12.90 (*)               Immature Granulocytes Absolute, Au*   0.24                   Lymphocytes Absolute          1.94                   Monocytes Absolute            2.26 (*)               Eosinophils Absolute          0.06                   Basophils  Absolute            0.10                COMPREHENSIVE METABOLIC PANEL - Abnormal     Glucose                       93                     Sodium                        132 (*)                Potassium                     2.8 (*)                Chloride                      92 (*)                 Bicarbonate                   28                     Anion Gap                     15                     Urea Nitrogen                 28 (*)                 Creatinine                    1.20                   eGFR                          70                     Calcium                       8.3 (*)                Albumin                       2.9 (*)                Alkaline Phosphatase          222 (*)                Total Protein                 6.6                    AST                           28                     Bilirubin, Total              2.5 (*)                ALT                           14                  PREALBUMIN - Abnormal     Prealbumin                    5.9 (*)             PROTIME-INR - Abnormal     Protime                       13.9 (*)               INR                           1.3 (*)                  Narrative: INR Therapeutic Range: 2.0-3.5  VANCOMYCIN - Abnormal     Vancomycin                    35.9 (*)                 Narrative: Vancomycin levels can be monitored according to area under the curve (AUC) or concentration (ug/mL). The preferred monitoring strategy is determined by the patient's renal function and indication for therapy.                                 For AUC monitoring, a random vancomycin level should be interpreted in the context of AUC rather than the concentration at a single point in time.                                For concentration monitoring, a trough concentration drawn immediately prior to the next dose is preferred.                                   Therapeutic ranges using concentration-guided results:                Peak (all ages):                   30.0-40.0 ug/mL                Trough (all ages):                10.0-20.0 ug/mL                                                                                   APTT - Abnormal     aPTT                          76.3 (*)                 Narrative: Patient is on HEPARIN.  APTT - Abnormal     aPTT                          55.0 (*)            BLOOD CULTURE - Normal     Blood Culture                                     BLOOD CULTURE - Normal     Blood Culture                                     MRSA SURVEILLANCE FOR VANCOMYCIN DE-ESCALATION, PCR - Normal     MRSA PCR                                               Narrative: This assay is an FDA-approved in vitro diagnostic nucleic acid amplification test for the detection of methicillin-resistant Staphylococcus aureus (MRSA) DNA directly from nasal swabs in patients at risk for nasal colonization. MRSA NxG is intended to aid in the prevention and control of MRSA infections in healthcare settings. This assay is NOT intended to diagnose, guide, or monitor treatment for MRSA infections, or provide results of susceptibility to methicillin. A negative result does not preclude MRSA nasal colonization. Test performance has not been evaluated in patients less than two years of age.  URINALYSIS WITH REFLEX CULTURE AND MICROSCOPIC - Normal     Color, Urine                                         Appearance, Urine             Clear                  Specific Gravity, Urine       1.017                  pH, Urine                     6.0                    Protein, Urine                NEGATIVE                Glucose, Urine                Normal                 Blood, Urine                  NEGATIVE                Ketones, Urine                NEGATIVE                Bilirubin, Urine              NEGATIVE                Urobilinogen, Urine           Normal                 Nitrite, Urine                NEGATIVE                Leukocyte Esterase, Urine     NEGATIVE             LACTATE -  Normal     Lactate                       1.9                      Narrative: Venipuncture immediately after or during the administration of Metamizole may lead to falsely low results. Testing should be performed immediately prior to Metamizole dosing.  SARS-COV-2 PCR - Normal     Coronavirus 2019, PCR                                  Narrative: This assay has received FDA Emergency Use Authorization (EUA) and is only authorized for the duration of time that circumstances exist to justify the authorization of the emergency use of in vitro diagnostic tests for the detection of SARS-CoV-2 virus and/or diagnosis of COVID-19 infection under section 564(b)(1) of the Act, 21 U.S.C. 360bbb-3(b)(1). This assay is an in vitro diagnostic nucleic acid amplification test for the qualitative detection of SARS-CoV-2 from nasopharyngeal specimens and has been validated for use at [facility]. Negative results do not preclude COVID-19 infections and should not be used as the sole basis for diagnosis, treatment, or other management decisions.                  INFLUENZA A AND B PCR - Normal     Flu A Result                                         Flu B Result                                           Narrative: This assay is an in vitro diagnostic multiplex nucleic acid amplification test for the detection and discrimination of Influenza A & B from nasopharyngeal specimens, and has been validated for use at [facility]. Negative results do not preclude Influenza A/B infections, and should not be used as the sole basis for diagnosis, treatment, or other management decisions. If Influenza A/B and RSV PCR results are negative, testing for Parainfluenza virus, Adenovirus and Metapneumovirus is routinely performed for [facility] pediatric oncology and intensive care inpatients, and is available on other patients by placing an add-on request.  RESPIRATORY CULTURE/SMEAR  STREPTOCOCCUS PNEUMONIAE  ANTIGEN, URINE  LEGIONELLA ANTIGEN, URINE  URINALYSIS WITH REFLEX CULTURE AND MICROSCOPIC       Narrative: The following orders were created for panel order Urinalysis with Reflex Culture and Microscopic.                Procedure                               Abnormality         Status                                   ---------                               -----------         ------                                   Urinalysis with Reflex C...[[id number]]  Normal              Final result                             Extra Urine Gray Tube[[id number]]                            Final result                                             Please view results for these tests on the individual orders.  TROPONIN SERIES- (INITIAL, 1 HR)       Narrative: The following orders were created for panel order Troponin Series, (0, 1 HR).                Procedure                               Abnormality         Status                                   ---------                               -----------         ------                                   Troponin I, High Sensiti...[[id number]]  Abnormal            Final result                             Troponin, High Sensitivi...[[id number]]  Abnormal            Final result                                             Please view results for these tests on the individual orders.  EXTRA URINE GRAY TUBE     Extra Tube                                        PROSTATE SPECIFIC ANTIGEN  CARCINOEMBRYONIC ANTIGEN  CANCER ANTIGEN 19-9  ALPHA-FETOPROTEIN  Lower extremity venous duplex bilateral   Final Result    No sign of acute deep venous thrombosis in the lower extremities.                MACRO:    none          Signed by: Shelby Stein 11/7/2024 10:56 PM    Dictation workstation:   [id number]     US right upper quadrant   Final Result    1. There is gallbladder wall thickening that may be due to incomplete    distention of the gallbladder    2. Mild hepatomegaly    3. No biliary distention           MACRO:    None          Signed by: Shelby Stein 11/7/2024 10:38 PM    Dictation workstation:   [id number]     CT angio chest for pulmonary embolism   Final Result    1. Low attenuating regions concerning for pulmonary emboli within the    distal right main pulmonary artery and segmental arteries of the    right middle and lower lobes. This may in part be related    to/accentuated by mixing artifact.    2. CT findings concerning for right heart strain. Consider    echocardiographic correlation.    3. Extensive, progressive consolidative opacities involving the right    perihilar region, right middle lobe, and right lower lobe. While this    may reflect pneumonia, superimposed malignancy is not excluded.    Pulmonary consultation is recommended.    4. Small loculated pleural effusion dependently in the right lung    base suspicious for empyema.    5. An ovoid consolidative focus laterally in the right lower/middle    lobes appears to demonstrate areas of internal complex fluid density.    Considerations include loculated effusion, region of pneumonia or    atelectasis with possible pulmonary abscess/necrosis, or a necrotic    lesion.    6. Cardiomegaly.          MACRO:    Sushil Isabel discussed the significance and urgency of this critical    finding by telephone with  LAURA CHAVARRIA on 11/7/2024 at 6:32    pm.  (**-RCF-**) Findings:  See findings.          Signed by: Sushil Isabel 11/7/2024 6:37 PM    Dictation workstation:   [id number]     CT abdomen pelvis w IV contrast   Final Result    1. Borderline hepatomegaly and trace perihepatic ascites. Correlate    with LFTs.    2. Mild gallbladder wall thickening in the setting of    underdistention. Correlate for clinical evidence of cholecystitis.    Ultrasound may be performed in further assessment.    3. Mild wall thickening of the hepatic flexure and mild wall    thickening and small amount of fluid of the splenic flexure. Findings    may be related to  underdistention or focal colitis.    4. Splenectomy.    5. Right basilar consolidations. Small effusion or empyema in the    dependent right lung base. Ovoid consolidative focus partially    visualized on this exam with internal peripherally enhancing fluid    which could reflect an area of empyema, pulmonary abscess, or    pulmonary necrosis/necrotic lesion.          MACRO:    None          Signed by: Sushil Isabel 11/7/2024 6:55 PM    Dictation workstation:   [id number]     XR chest 2 views   Final Result    1.  Unchanged small right pleural effusion but progression of volume    loss right middle and lower lobes.                      MACRO:    None          Signed by: Adela Ferreira 11/7/2024 4:40 PM    Dictation workstation:   [id number]     NM bone whole body    (Results Pending)  US thoracentesis    (Results Pending)  Medications  heparin 25,000 Units in dextrose 5% 250 mL (100 Units/mL) infusion (premix) (1,200 Units/hr intravenous Rate/Dose Verify 11/8/24 0949)  heparin (porcine) injection 3,000-6,000 Units (has no administration in time range)  piperacillin-tazobactam (Zosyn) 3.375 g in dextrose (iso) IV 50 mL (0 g intravenous Stopped 11/8/24 0618)  vancomycin (Vancocin) pharmacy to dose - pharmacy monitoring (has no administration in time range)  aspirin EC tablet 81 mg (81 mg oral Given 11/8/24 0858)  atorvastatin (Lipitor) tablet 80 mg (80 mg oral Given 11/8/24 0858)  hydrOXYzine pamoate (Vistaril) capsule 25 mg (25 mg oral Given 11/8/24 0716)   levothyroxine (Synthroid, Levoxyl) tablet 25 mcg (25 mcg oral Given 11/8/24 0603)  metoprolol tartrate (Lopressor) tablet 25 mg (25 mg oral Given 11/8/24 0858)   pantoprazole (ProtoNix) EC tablet 40 mg (40 mg oral Given 11/8/24 0603)  PARoxetine (Paxil) tablet 10 mg (10 mg oral Given 11/8/24 0603)  benzocaine-menthol (Cepastat Sore Throat) lozenge 1 lozenge (has no administration in time range)  dextromethorphan-guaifenesin (Robitussin DM)  mg/5 mL oral  liquid 5 mL (5 mL oral Given 11/8/24 0716)  guaiFENesin (Mucinex) 12 hr tablet 600 mg (has no administration in time range)  vancomycin (Xellia) 1.5 g in diluent combination  mL (has no administration in time range)  albuterol 2.5 mg /3 mL (0.083 %) nebulizer solution 5 mg (has no administration in time range)  potassium chloride 20 mEq in sterile water for injection 100 mL (20 mEq intravenous Not Given 11/8/24 1103)  acetaminophen (Tylenol) tablet 650 mg (650 mg oral Given 11/8/24 0857)  sodium chloride 0.9% infusion (75 mL/hr intravenous New Bag 11/8/24 1110)  ipratropium-albuteroL (Duo-Neb) 0.5-2.5 mg/3 mL nebulizer solution 3 mL (3 mL nebulization Given 11/7/24 1711)  iohexol (OMNIPaque) 350 mg iodine/mL solution 75 mL (75 mL intravenous Given 11/7/24 1807)  heparin (porcine) injection 6,250 Units (6,250 Units intravenous Given 11/7/24 1933)  hydrOXYzine pamoate (Vistaril) capsule 25 mg (25 mg oral Given 11/7/24 1934)   piperacillin-tazobactam (Zosyn) 4.5 g in dextrose (iso)  mL (0 g intravenous Stopped 11/7/24 2001)  azithromycin 500 mg in dextrose 5% 250 mL IV (0 mg intravenous Stopped 11/7/24 2057)  vancomycin (Xellia) 2 g in diluent combination  mL (0 g intravenous Stopped 11/8/24 0251)  sodium chloride 0.9 % bolus 1,000 mL (0 mL intravenous Stopped 11/7/24 2113)   potassium chloride (Klor-Con) packet 40 mEq (40 mEq oral Given 11/8/24 0712)   potassium chloride CR (Klor-Con M20) ER tablet 40 mEq (40 mEq oral Given 11/8/24 0857)  Tc-99m-medronate sodium (Draximage) injection 26.7 millicurie (26.7 millicuries intravenous Given 11/8/24 0950)  Current Discharge Medication List                Procedure  Procedures     Cam Lambert PA-C  11/08/24 1128

## 2024-11-08 NOTE — CONSULTS
Inpatient consult to Cardiology  Consult performed by: URIAH Faustin-CNP  Consult ordered by: Darrel Dorantes MD  Reason for consult: NSTEMI      Please see consult note completed by Dr. Simmons on 11/8/2024.

## 2024-11-08 NOTE — CONSULTS
"Nutrition Assessement Note    Nutrition Assessment    Reason for Assessment: Admission nursing screening    Malnutrition Screening Tool (MST)  Have you recently lost weight without trying?: No  Weight Loss Score: 0  Have you been eating poorly because of a decreased appetite?: No  Malnutrition Score: 0  Nutrition Screen  Stage 3 or 4 Pressure Injury or Multiple Non-Healing Wounds: No  Home Tube Feeding or Total Parenteral Nutrition (TPN): No  Dietitian Consult Needed: No    Reason for Hospital Admission:  Carl Vazquez is a 59 y.o. male who is admitted for PNA. Spouse bedside at time of visit. Patient reports poor appetite for about 1 month, denies weight loss. Agreeable to nutritional supplements with meals. Offered low sodium and CCD diet education and patient declines.    Past Medical History:   Diagnosis Date    Aortic valve disorder 01/06/2015    Basal cell carcinoma 01/22/2024    Chest pain 01/22/2024    Chronic systolic heart failure 01/22/2024    Coronary arteriosclerosis 01/22/2024    Essential hypertension 01/22/2024    Hodgkin lymphoma 01/22/2024    Hx of replacement of aortic valve 01/22/2024    Mixed hyperlipidemia 02/23/2005    Pleural effusion 01/22/2024    Shortness of breath 01/22/2024    Type 2 diabetes mellitus without complication (Multi) 08/02/2023    Venous thrombosis 01/22/2024      Past Surgical History:   Procedure Laterality Date    CT GUIDED CHEST TUBE PLACEMENT  7/25/2018    CT GUIDED CHEST TUBE PLACEMENT [facility]       Nutrition History:  Food and Nutrient History: patient reports poor appetite for about 1 month  Energy Intake: Poor < 50 %     Food Allergies/Intolerances:  None  GI Symptoms: None  Oral Problems: None    Anthropometrics:  Ht: 180.3 cm (5' 11\"), Wt: 80.2 kg (176 lb 12.9 oz), BMI: 24.67  IBW/kg (Dietitian Calculated): 78.18 kg  Percent of IBW: 102 %     Weight Change:  Daily Weight  11/08/24 : 80.2 kg (176 lb 12.9 oz)  10/31/24 : 79.4 kg (175 lb)  10/07/24 : " 79.8 kg (176 lb)  10/07/24 : 79.4 kg (175 lb)  07/24/24 : 80.7 kg (178 lb)  01/23/24 : 79.4 kg (175 lb)  01/09/24 : 80.7 kg (178 lb)  11/25/23 : 77.1 kg (169 lb 15.6 oz)  11/20/23 : 74.8 kg (164 lb 14.5 oz)  10/08/23 : 79.4 kg (175 lb 0.7 oz)     Weight History / % Weight Change: patient reports stable weight of about 175#  Significant Weight Loss: No        Nutrition Focused Physical Exam Findings:   Subcutaneous Fat Loss  Orbital Fat Pads: Well nourished (slightly bulging fat pads)  Buccal Fat Pads: Well nourished (full, rounded cheeks)  Triceps: Mild-Moderate (less than ample fat tissue)    Muscle Wasting  Temporalis: Mild-Moderate (slight depression)  Pectoralis (Clavicular Region): Well nourished (clavicle not visible)  Deltoid/Trapezius: Well nourished (rounded appearance at arm, shoulder, neck)  Gastrocnemius: Well nourished (well developed bulbous muscle)    Nutrition Significant Labs:  Lab Results   Component Value Date    WBC 17.5 (H) 11/08/2024    HGB 12.5 (L) 11/08/2024    HCT 34.7 (L) 11/08/2024     11/08/2024    CHOL 164 08/02/2023    TRIG 84 08/02/2023    HDL 53 08/02/2023    ALT 14 11/08/2024    AST 28 11/08/2024     (L) 11/08/2024    K 2.8 (LL) 11/08/2024    CL 92 (L) 11/08/2024    CREATININE 1.20 11/08/2024    BUN 28 (H) 11/08/2024    CO2 28 11/08/2024    TSH 2.77 08/02/2023    PSA 2.30 11/08/2024    INR 1.3 (H) 11/08/2024    HGBA1C 7.0 (H) 07/16/2018    ALBUR 12 08/02/2023     Nutrition Specific Medications:  aspirin, 81 mg, oral, Every other day  atorvastatin, 80 mg, oral, Daily  levothyroxine, 25 mcg, oral, Daily before breakfast  metoprolol tartrate, 25 mg, oral, BID  pantoprazole, 40 mg, oral, Daily before breakfast  PARoxetine, 10 mg, oral, Daily before breakfast  piperacillin-tazobactam, 3.375 g, intravenous, q6h JENNIFER  potassium chloride, 20 mEq, intravenous, Once  vancomycin, 1,500 mg, intravenous, q24h      heparin, 0-4,500 Units/hr, Last Rate: 1,200 Units/hr (11/08/24  0949)  sodium chloride 0.9%, 75 mL/hr, Last Rate: 75 mL/hr (11/08/24 1110)      Dietary Orders (From admission, onward)       Start     Ordered    11/08/24 1314  Oral nutritional supplements  Until discontinued        Comments: Chocolate   Question Answer Comment   Deliver with All meals    Select supplement: Ensure High Protein        11/08/24 1314    11/08/24 0001  NPO Diet Except: Ice chips, Sips with meds; Effective midnight  Diet effective midnight        Question Answer Comment   Except: Ice chips    Except: Sips with meds        11/07/24 2130 11/07/24 2227  May Participate in Room Service  ( ROOM SERVICE MAY PARTICIPATE)  Once        Question:  .  Answer:  Yes    11/07/24 2226                   Estimated Needs:   Estimated Energy Needs  Total Energy Estimated Needs (kCal):  (0759-4583)  Total Estimated Energy Need per Day (kCal/kg):  (25-28)  Method for Estimating Needs: Actual wt    Estimated Protein Needs  Total Protein Estimated Needs (g): 80 g  Total Protein Estimated Needs (g/kg): 1 g/kg  Method for Estimating Needs: Actual wt    Estimated Fluid Needs  Total Fluid Estimated Needs (mL): 2000 mL  Total Fluid Estimated Needs (mL/kg): 25 mL/kg  Method for Estimating Needs: Actual wt      Nutrition Diagnosis   Nutrition Diagnosis:     Nutrition Diagnosis  Patient has Nutrition Diagnosis: Yes  Diagnosis Status (1): New  Nutrition Diagnosis 1: Inadequate energy intake  Related to (1): decreased ability to consume sufficient energy  As Evidenced by (1): poor appetite       Nutrition Interventions/Recommendations   Nutrition Interventions and Recommendations:    Nutrition Prescription:  Individualized Nutrition Prescription Provided for : 9400-3888 calories, 80 gm protein to be provided via diet/nutritional supplements    Nutrition Interventions:   Food and/or Nutrient Delivery Interventions  Interventions: Meals and snacks, Medical food supplement  Meals and Snacks: Fat-modified diet, Mineral-modified diet,  Carbohydrate-modified diet  Goal: advance as able    Education Documentation  No documentation found.         Nutrition Monitoring and Evaluation   Monitoring/Evaluation:   Food/Nutrient Related History Monitoring  Monitoring and Evaluation Plan: Energy intake  Energy Intake: Estimated energy intake  Criteria: pt to consume >/= 75% estimated needs       Time Spent/Follow-up:   Follow Up  Time Spent (min): 30 minutes  Last Date of Nutrition Visit: 11/08/24  Nutrition Follow-Up Needed?: 7-10 days  Follow up Comment: 11/15/24

## 2024-11-08 NOTE — CONSULTS
Reason For Consult  Right pleural effusion    History Of Present Illness  Carl Vazquez is a 59 y.o. male presenting with fatigue ,malaise and productive cough since early Oct. He was seen by his PCP and given a Z latha. He states he felt a bit better but then continued to feel poorly. PCP ordered a cxr and suggested admission to the hospital. He was worked up and found to have a right pleural effusion with questionable PE. He is on a heparin drip per PE protocol. His past medical history includes CABG/AVR, right decortication.We are consulted for drainage of right pleural effusion.    Past Medical History  He has a past medical history of Aortic valve disorder (01/06/2015), Basal cell carcinoma (01/22/2024), Chest pain (01/22/2024), Chronic systolic heart failure (01/22/2024), Coronary arteriosclerosis (01/22/2024), Essential hypertension (01/22/2024), Hodgkin lymphoma (01/22/2024), replacement of aortic valve (01/22/2024), Mixed hyperlipidemia (02/23/2005), Pleural effusion (01/22/2024), Shortness of breath (01/22/2024), Type 2 diabetes mellitus without complication (Multi) (08/02/2023), and Venous thrombosis (01/22/2024).    Surgical History  He has a past surgical history that includes CT guided chest tube placement (7/25/2018).     Social History  He reports that he has never smoked. He has never been exposed to tobacco smoke. He has never used smokeless tobacco. He reports current alcohol use of about 6.0 standard drinks of alcohol per week. He reports that he does not use drugs.    Family History  Family History   Problem Relation Name Age of Onset    No Known Problems Mother      No Known Problems Father          Allergies  Phenobarbital             Physical Exam  Constitutional:       Normal appearance, well-developed, well-nourished.   HENT:      Head: Normocephalic and atraumatic.   Cardiovascular:      Rate and Rhythm: RRR, no murmur, nl S1/S2.      Pulses: Normal, <3 sec cap refill.   Pulmonary:       "Clear left chest, diminished right chest  Abdominal:      Bowel sounds present, soft.   Musculoskeletal:         No gross abnormalities.  Skin:     Warm and dry, no rashes.   Neurological:      Alert, no focal deficits.          Last Recorded Vitals  Blood pressure 106/59, pulse 94, temperature (!) 38.6 °C (101.5 °F), temperature source Temporal, resp. rate 16, height 1.803 m (5' 11\"), weight 80.2 kg (176 lb 12.9 oz), SpO2 93%.    Relevant Results  CT angio chest for pulmonary embolism 11/07/2024    Narrative  Interpreted By:  Sushil Isabel,  STUDY:  CT ANGIO CHEST FOR PULMONARY EMBOLISM;  11/7/2024 6:16 pm    INDICATION:  Signs/Symptoms:elevated troponin, shortness of breath.      COMPARISON:  CTA chest for PE 10/07/2024    ACCESSION NUMBER(S):  [id number]    ORDERING CLINICIAN:  LAURA CHAVARRIA    TECHNIQUE:  Contiguous axial images of the chest were obtained after the  intravenous administration of 75 mL Omnipaque 350 contrast using  angiographic PE protocol. Coronal and sagittal reformatted images  were reconstructed from the axial data. MIP images were created on an  independent workstation and reviewed.    FINDINGS:  PULMONARY ARTERIES: Adequate opacification to the level of the  segmental arteries. Ill-defined areas of low-attenuation within the  distal right main pulmonary artery as well as throughout the proximal  segmental arteries of the right middle and lower lobes. While some of  this may be related to mixing artifact, the overall appearance is  concerning for pulmonary embolus. This is particularly true within  the distal right main pulmonary artery (series 5, image 135 segmental  arteries of the right middle lobe (series 5, image 150). The main  pulmonary artery is normal in diameter. Right ventricular to left  ventricular ratio of roughly 1.5 concerning for right heart strain.    HEART: Heart is enlarged. Triple-vessel coronary vascular  calcifications. Calcifications in the plane of the " aortic and mitral  valves. No significant pericardial effusion.    VESSELS: No aortic aneurysm. Assessment for aortic patency limited by  phase of contrast enhancement. Moderate calcifications of the  thoracic aorta.    MEDIASTINUM AND LYMPH NODES: Multiple nodules within the thyroid,  greatest on the right. This may be further assessed with outpatient  ultrasound. Prominent mediastinal lymph nodes including a 1.1 cm  right paratracheal node and 1.4 cm subcarinal node. No  pneumomediastinum. The esophagus appears within normal limits.    LUNG, AIRWAYS, AND PLEURA: The trachea and proximal mainstem bronchi  are patent. Extensive, progressive consolidations are noted  throughout the right lung, most pronounced within the perihilar  region, right middle lobe, and right lower lobe. There appears to be  narrowing and effacement of the right lower lobe bronchi. Multiple  air bronchograms are noted within the right lower lobe consolidation.  Loculated right lower lobe effusion measuring up to 7.9 cm with  surrounding wall thickening suggesting possible empyema.  Additionally, there is a 10.2 cm ovoid consolidative focus laterally  at the right lung base (series 5 image 193). Which appears to  demonstrate areas of complex fluid density. An additional loculated  effusion or possibly pulmonary abscess not excluded.    OSSEOUS STRUCTURES: No acute osseous abnormality. Median sternotomy  changes.    CHEST WALL SOFT TISSUES: No discernible abnormality.    UPPER ABDOMEN/OTHER: Please see separately dictated CT of the abdomen  and pelvis.    Impression  1. Low attenuating regions concerning for pulmonary emboli within the  distal right main pulmonary artery and segmental arteries of the  right middle and lower lobes. This may in part be related  to/accentuated by mixing artifact.  2. CT findings concerning for right heart strain. Consider  echocardiographic correlation.  3. Extensive, progressive consolidative opacities involving  the right  perihilar region, right middle lobe, and right lower lobe. While this  may reflect pneumonia, superimposed malignancy is not excluded.  Pulmonary consultation is recommended.  4. Small loculated pleural effusion dependently in the right lung  base suspicious for empyema.  5. An ovoid consolidative focus laterally in the right lower/middle  lobes appears to demonstrate areas of internal complex fluid density.  Considerations include loculated effusion, region of pneumonia or  atelectasis with possible pulmonary abscess/necrosis, or a necrotic  lesion.  6. Cardiomegaly.    MACRO:  Sushil Isabel discussed the significance and urgency of this critical  finding by telephone with  LAURA CHAVARRIA on 11/7/2024 at 6:32  pm.  (**-RCF-**) Findings:  See findings.    Signed by: Sushil Isabel 11/7/2024 6:37 PM  Dictation workstation:   [id number]  Results for orders placed or performed during the hospital encounter of 11/07/24 (from the past 24 hours)   CBC and Auto Differential   Result Value Ref Range    WBC 17.2 (H) 4.4 - 11.3 x10*3/uL    nRBC 0.1 (H) 0.0 - 0.0 /100 WBCs    RBC 4.07 (L) 4.50 - 5.90 x10*6/uL    Hemoglobin 13.1 (L) 13.5 - 17.5 g/dL    Hematocrit 38.0 (L) 41.0 - 52.0 %    MCV 93 80 - 100 fL    MCH 32.2 26.0 - 34.0 pg    MCHC 34.5 32.0 - 36.0 g/dL    RDW 14.8 (H) 11.5 - 14.5 %    Platelets 344 150 - 450 x10*3/uL    Neutrophils % 72.3 40.0 - 80.0 %    Immature Granulocytes %, Automated 1.5 (H) 0.0 - 0.9 %    Lymphocytes % 10.9 13.0 - 44.0 %    Monocytes % 14.5 2.0 - 10.0 %    Eosinophils % 0.2 0.0 - 6.0 %    Basophils % 0.6 0.0 - 2.0 %    Neutrophils Absolute 12.47 (H) 1.20 - 7.70 x10*3/uL    Immature Granulocytes Absolute, Automated 0.25 0.00 - 0.70 x10*3/uL    Lymphocytes Absolute 1.87 1.20 - 4.80 x10*3/uL    Monocytes Absolute 2.50 (H) 0.10 - 1.00 x10*3/uL    Eosinophils Absolute 0.03 0.00 - 0.70 x10*3/uL    Basophils Absolute 0.10 0.00 - 0.10 x10*3/uL   Comprehensive metabolic panel    Result Value Ref Range    Glucose 139 (H) 74 - 99 mg/dL    Sodium 133 (L) 136 - 145 mmol/L    Potassium 3.5 3.5 - 5.3 mmol/L    Chloride 91 (L) 98 - 107 mmol/L    Bicarbonate 33 (H) 21 - 32 mmol/L    Anion Gap 13 10 - 20 mmol/L    Urea Nitrogen 30 (H) 6 - 23 mg/dL    Creatinine 1.41 (H) 0.50 - 1.30 mg/dL    eGFR 57 (L) >60 mL/min/1.73m*2    Calcium 9.0 8.6 - 10.3 mg/dL    Albumin 3.0 (L) 3.4 - 5.0 g/dL    Alkaline Phosphatase 259 (H) 33 - 120 U/L    Total Protein 7.1 6.4 - 8.2 g/dL    AST 22 9 - 39 U/L    Bilirubin, Total 2.7 (H) 0.0 - 1.2 mg/dL    ALT 12 10 - 52 U/L   B-type natriuretic peptide   Result Value Ref Range    BNP 1,095 (H) 0 - 99 pg/mL   Troponin I, High Sensitivity, Initial   Result Value Ref Range    Troponin I, High Sensitivity 182 (HH) 0 - 20 ng/L   ECG 12 lead   Result Value Ref Range    Ventricular Rate 88 BPM    Atrial Rate 88 BPM    UT Interval 190 ms    QRS Duration 108 ms    QT Interval 402 ms    QTC Calculation(Bazett) 486 ms    P Axis 29 degrees    R Axis 157 degrees    T Axis 41 degrees    QRS Count 14 beats    Q Onset 210 ms    P Onset 115 ms    P Offset 141 ms    T Offset 411 ms    QTC Fredericia 456 ms   Troponin, High Sensitivity, 1 Hour   Result Value Ref Range    Troponin I, High Sensitivity 151 (HH) 0 - 20 ng/L   Urinalysis with Reflex Culture and Microscopic   Result Value Ref Range    Color, Urine Light-Yellow Light-Yellow, Yellow, Dark-Yellow    Appearance, Urine Clear Clear    Specific Gravity, Urine 1.017 1.005 - 1.035    pH, Urine 6.0 5.0, 5.5, 6.0, 6.5, 7.0, 7.5, 8.0    Protein, Urine NEGATIVE NEGATIVE, 10 (TRACE), 20 (TRACE) mg/dL    Glucose, Urine Normal Normal mg/dL    Blood, Urine NEGATIVE NEGATIVE    Ketones, Urine NEGATIVE NEGATIVE mg/dL    Bilirubin, Urine NEGATIVE NEGATIVE    Urobilinogen, Urine Normal Normal mg/dL    Nitrite, Urine NEGATIVE NEGATIVE    Leukocyte Esterase, Urine NEGATIVE NEGATIVE   Extra Urine Gray Tube   Result Value Ref Range    Extra Tube Hold  for add-ons.    aPTT   Result Value Ref Range    aPTT 34.2 (H) 22.0 - 32.5 seconds   CBC   Result Value Ref Range    WBC 18.0 (H) 4.4 - 11.3 x10*3/uL    nRBC 0.0 0.0 - 0.0 /100 WBCs    RBC 4.04 (L) 4.50 - 5.90 x10*6/uL    Hemoglobin 13.0 (L) 13.5 - 17.5 g/dL    Hematocrit 37.7 (L) 41.0 - 52.0 %    MCV 93 80 - 100 fL    MCH 32.2 26.0 - 34.0 pg    MCHC 34.5 32.0 - 36.0 g/dL    RDW 14.9 (H) 11.5 - 14.5 %    Platelets 352 150 - 450 x10*3/uL   Lactate   Result Value Ref Range    Lactate 1.9 0.4 - 2.0 mmol/L   Blood Culture    Specimen: Peripheral Venipuncture; Blood culture   Result Value Ref Range    Blood Culture Loaded on Instrument - Culture in progress    Blood Culture    Specimen: Peripheral Venipuncture; Blood culture   Result Value Ref Range    Blood Culture Loaded on Instrument - Culture in progress    Sars-CoV-2 PCR   Result Value Ref Range    Coronavirus 2019, PCR Not Detected Not Detected   aPTT   Result Value Ref Range    aPTT 76.3 (H) 22.0 - 32.5 seconds   Influenza A, and B PCR   Result Value Ref Range    Flu A Result Not Detected Not Detected    Flu B Result Not Detected Not Detected   MRSA Surveillance for Vancomycin De-escalation, PCR    Specimen: Anterior Nares; Swab   Result Value Ref Range    MRSA PCR Not Detected Not Detected   CBC and Auto Differential   Result Value Ref Range    WBC 17.5 (H) 4.4 - 11.3 x10*3/uL    nRBC 0.1 (H) 0.0 - 0.0 /100 WBCs    RBC 3.82 (L) 4.50 - 5.90 x10*6/uL    Hemoglobin 12.5 (L) 13.5 - 17.5 g/dL    Hematocrit 34.7 (L) 41.0 - 52.0 %    MCV 91 80 - 100 fL    MCH 32.7 26.0 - 34.0 pg    MCHC 36.0 32.0 - 36.0 g/dL    RDW 14.8 (H) 11.5 - 14.5 %    Platelets 325 150 - 450 x10*3/uL    Neutrophils % 73.7 40.0 - 80.0 %    Immature Granulocytes %, Automated 1.4 (H) 0.0 - 0.9 %    Lymphocytes % 11.1 13.0 - 44.0 %    Monocytes % 12.9 2.0 - 10.0 %    Eosinophils % 0.3 0.0 - 6.0 %    Basophils % 0.6 0.0 - 2.0 %    Neutrophils Absolute 12.90 (H) 1.20 - 7.70 x10*3/uL    Immature  Granulocytes Absolute, Automated 0.24 0.00 - 0.70 x10*3/uL    Lymphocytes Absolute 1.94 1.20 - 4.80 x10*3/uL    Monocytes Absolute 2.26 (H) 0.10 - 1.00 x10*3/uL    Eosinophils Absolute 0.06 0.00 - 0.70 x10*3/uL    Basophils Absolute 0.10 0.00 - 0.10 x10*3/uL   Comprehensive metabolic panel   Result Value Ref Range    Glucose 93 74 - 99 mg/dL    Sodium 132 (L) 136 - 145 mmol/L    Potassium 2.8 (LL) 3.5 - 5.3 mmol/L    Chloride 92 (L) 98 - 107 mmol/L    Bicarbonate 28 21 - 32 mmol/L    Anion Gap 15 mmol/L    Urea Nitrogen 28 (H) 6 - 23 mg/dL    Creatinine 1.20 0.50 - 1.30 mg/dL    eGFR 70 >60 mL/min/1.73m*2    Calcium 8.3 (L) 8.6 - 10.3 mg/dL    Albumin 2.9 (L) 3.4 - 5.0 g/dL    Alkaline Phosphatase 222 (H) 33 - 120 U/L    Total Protein 6.6 6.4 - 8.2 g/dL    AST 28 9 - 39 U/L    Bilirubin, Total 2.5 (H) 0.0 - 1.2 mg/dL    ALT 14 10 - 52 U/L   Prealbumin   Result Value Ref Range    Prealbumin 5.9 (L) 18.0 - 40.0 mg/dL   Protime-INR   Result Value Ref Range    Protime 13.9 (H) 9.3 - 12.7 seconds    INR 1.3 (H) 0.9 - 1.2   Vancomycin   Result Value Ref Range    Vancomycin 35.9 (H) 5.0 - 20.0 ug/mL   aPTT   Result Value Ref Range    aPTT 55.0 (H) 22.0 - 32.5 seconds     *Note: Due to a large number of results and/or encounters for the requested time period, some results have not been displayed. A complete set of results can be found in Results Review.            Assessment/Plan   HTN  Pneumonia  Hx Hodgkins lymphoma  Hx of CABG/AVR  DM II    Patient admitted with fatigue,malaise and productive cough. CT with pneumonia and possible lung mass. Reviewed with Dr Davis, and IR. Little to no fluid evident in right thorax. No indication for drain at this time. Recommend antibiotics.Possible bronch with Pulm.   Will update patient and wife .     I spent 30 minutes in the professional and overall care of this patient.    Will sign off.   Please call with any questions or concerns.  Dior Becerril PA-C

## 2024-11-08 NOTE — PROGRESS NOTES
Vancomycin Dosing by Pharmacy- FOLLOW UP    Carl Vazquez is a 59 y.o. year old male who Pharmacy has been consulted for vancomycin dosing for pneumonia. Based on the patient's indication and renal status this patient is being dosed based on a goal AUC of 400-600.     Renal function is currently stable.    Current vancomycin dose: 1500 mg given every 24 hours    Estimated vancomycin AUC on current dose: 525 mg/L.hr     Visit Vitals  BP 90/50 (BP Location: Left arm, Patient Position: Lying)   Pulse 94   Temp 36.8 °C (98.2 °F) (Temporal)   Resp 18        Lab Results   Component Value Date    CREATININE 1.20 2024    CREATININE 1.41 (H) 2024    CREATININE 1.03 10/07/2024    CREATININE 1.21 2024        Patient weight is as follows:   Vitals:    24 0528   Weight: 80.2 kg (176 lb 12.9 oz)       Cultures:  No results found for the encounter in last 14 days.       I/O last 3 completed shifts:  In: 1749 (21.8 mL/kg) [P.O.:400; IV Piggyback:1349]  Out: 740 (9.2 mL/kg) [Urine:740 (0.3 mL/kg/hr)]  Weight: 80.2 kg   I/O during current shift:  No intake/output data recorded.    Temp (24hrs), Av.2 °C (99 °F), Min:36.8 °C (98.2 °F), Max:38.6 °C (101.5 °F)      Assessment/Plan    Within goal AUC range. Continue current vancomycin regimen.    This dosing regimen is predicted by [employer] to result in the following pharmacokinetic parameters:  Loading dose: N/A  Regimen: 1500 mg IV every 24 hours.  Start time: 01:04 on 2024  Exposure target: AUC24 (range)400-600 mg/L.hr   [id number]: 532 mg/L.hr  AUC24,ss: 525 mg/L.hr  Probability of AUC24 > 400: 87 %  Ctrough,ss: 12.4 mg/L  Probability of Ctrough,ss > 20: 10 %      The next level will be obtained on 11-15 with am labs. May be obtained sooner if clinically indicated.   Will continue to monitor renal function daily while on vancomycin and order serum creatinine at least every 48 hours if not already ordered.  Follow for continued vancomycin needs,  clinical response, and signs/symptoms of toxicity.       GIULIANA PAGAN, PharmD

## 2024-11-08 NOTE — CONSULTS
Consults  History Of Present Illness:    Carl Vazquez is a 59 y.o. male presenting with pulmonary embolism.    The patient is a 59-year-old white male whose past medical history includes Hodgkin's lymphoma that was treated with mediastinal radiation and chemotherapy.  The patient as a consequence that developed premature coronary artery disease and underwent a CABG x 4 in 2007 with an in situ LIMA to the LAD, saphenous vein graft to PDA and a sequential radial artery graft to diagonal branch and obtuse marginal branch.  Patient did well but subsequently developed severe aortic valve stenosis and he underwent a redo sternotomy with aortic valve replacement using a 23 mm Saint Markel trifecta valve along with a single CABG with an SVG to the original RCA graft.  The patient subsequently developed a ongoing recurrent loculated right sided pleural effusion with lung entrapment and shortness of breath.  On 11/29/2018 the patient underwent a full right sided lung decortication and a full right sided posterolateral thoracotomy.  The patient had a acute non-STEMI on 6/26/2020 and underwent urgent cardiac catheterization with PCI in 2 drug eluting stents deployed to the RCA due to closure of the saphenous vein graft to the RCA.    The patient over the past 1 month has been experiencing a persistent cough with intermittent low-grade fever.  He was seen by his primary care physician and empirically placed on a Z-Alejandro.  The patient was actually seen in the emergency room on 10/7/2024 and a chest x-ray showed a possible pleural effusion.  High-sensitivity troponins were negative BNP was 520.  CT angiogram of the chest was negative for pulmonary embolism no evidence of pleural effusion.  There was some question of multifocal pneumonia on the CT image and the patient was placed on antibiotic therapy.  The patient's improved slightly but persisted and ultimately he was advised by primary care to return to the emergency room to be  reevaluated.  Initial evaluation in the emergency room included a CTA of the chest showing probable pulmonary emboli within the distal right main pulmonary artery and segmental arteries of the right middle and lower lobes.  There was also some CT findings concerning for right ventricular strain with echo recommended.  There was extensive progressive consolidative opacities involving the right perihilar region right middle lobe right lower lobe consistent with possible pneumonia though superimposed malignancy not excluded with pulmonary consultation recommended.  He also had a small loculated pleural effusion at the right lung base.  There was ovoid consolidation in the right lower and middle lobes possible loculated effusion.  Patient also had an abdominal CT showing borderline hepatomegaly mild gallbladder wall thickening mild thickening of the Paddock flexure splenectomy right basilar consolidation. The patient was started on an IV heparin infusion.  Echocardiogram is currently being performed and reviewed in progress.  He does demonstrate evidence of right sided strain with moderate RV chamber dilatation flattening of the interventricular septum and hypokinesis of the free wall of the right ventricle.  The preliminary estimate of the PA systolic pressures in the range of 35 mmHg.  The patient's EKG demonstrates sinus rhythm right axis deviation incomplete right bundle branch block.  The patient has been seen by pulmonology and bronchoscopy is being considered to rule out an endobronchial lesion on the right side causing partial collapse of the right lung.  Patient also seen by cardiothoracic surgery and no indication for thoracentesis or chest tube at this time.       Last Recorded Vitals:  Vitals:    11/08/24 0528 11/08/24 0812 11/08/24 1100 11/08/24 1132   BP: 99/59 106/59  90/50   BP Location: Left arm Left arm  Left arm   Patient Position: Lying Lying  Lying   Pulse: 94      Resp: 18 16  18   Temp: 37 °C  (98.6 °F) (!) 38.6 °C (101.5 °F) 36.8 °C (98.2 °F)    TempSrc: Temporal Temporal Temporal    SpO2: 90% 93%  91%   Weight: 80.2 kg (176 lb 12.9 oz)      Height:           Last Labs:  CBC - 11/8/2024:  4:45 AM  17.5 12.5 325    34.7      CMP - 11/8/2024:  4:45 AM  8.3 6.6 28 --- 2.5   _ 2.9 14 222      PTT - 11/8/2024:  8:13 AM  1.3   13.9 55.0     Troponin I, High Sensitivity   Date/Time Value Ref Range Status   11/07/2024 05:51  (HH) 0 - 20 ng/L Final     Comment:     Previous result verified on 11/7/2024 1735 on specimen/case [id number] called with component [facility] for procedure Troponin I, High Sensitivity, Initial with value 182 ng/L.   11/07/2024 04:44  (HH) 0 - 20 ng/L Final   10/07/2024 11:16 PM 10 0 - 20 ng/L Final     BNP   Date/Time Value Ref Range Status   11/07/2024 04:44 PM 1,095 (H) 0 - 99 pg/mL Final   10/07/2024 10:02  (H) 0 - 99 pg/mL Final     Hemoglobin A1C   Date/Time Value Ref Range Status   07/16/2018 07:46 AM 7.0 (H) 4.0 - 6.0 % Final     Comment:     Hemoglobin A1C levels are related to mean blood glucose during the   preceding 2-3 months. The relationship table below may be used as a   general guide. Each 1% increase in HGB A1C is a reflection of an   increase in mean glucose of approximately 30 mg/dl.   Reference: Diabetes Care, volume 29, supplement 1 Jan. 2006                        HGB A1C ................. Approx. Mean Glucose   _______________________________________________   6%   ...............................  120 mg/dl   7%   ...............................  150 mg/dl   8%   ...............................  180 mg/dl   9%   ...............................  210 mg/dl   10%  ...............................  240 mg/dl  Performed at [address] [address]     02/15/2018 11:37 AM 6.9 (H) 4.0 - 6.0 % Final     Comment:     Hemoglobin A1C levels are related to mean blood glucose during the   preceding 2-3 months. The relationship table below  "may be used as a   general guide. Each 1% increase in HGB A1C is a reflection of an   increase in mean glucose of approximately 30 mg/dl.   Reference: Diabetes Care, volume 29, supplement 1 Jan. 2006                        HGB A1C ................. Approx. Mean Glucose   _______________________________________________   6%   ...............................  120 mg/dl   7%   ...............................  150 mg/dl   8%   ...............................  180 mg/dl   9%   ...............................  210 mg/dl   10%  ...............................  240 mg/dl  Performed at [address] [address]       LDL Calculated   Date/Time Value Ref Range Status   08/02/2023 04:19 PM 94 65 - 130 MG/DL Final   08/24/2021 02:51 PM 88 65 - 130 MG/DL Final   06/27/2020 04:54 AM 79 65 - 130 MG/DL Final      Last I/O:  I/O last 3 completed shifts:  In: 1749 (21.8 mL/kg) [P.O.:400; IV Piggyback:1349]  Out: 740 (9.2 mL/kg) [Urine:740 (0.3 mL/kg/hr)]  Weight: 80.2 kg     Past Cardiology Tests (Last 3 Years):  EKG:  ECG 12 lead 11/07/2024      ECG 12 lead 10/07/2024      ECG 12 lead (Clinic Performed) 01/23/2024    Echo:  No results found for this or any previous visit from the past 1095 days.    Ejection Fractions:  No results found for: \"EF\"  Cath:  No results found for this or any previous visit from the past 1095 days.    Stress Test:  No results found for this or any previous visit from the past 1095 days.    Cardiac Imaging:  No results found for this or any previous visit from the past 1095 days.      Past Medical History:  He has a past medical history of Aortic valve disorder (01/06/2015), Basal cell carcinoma (01/22/2024), Chest pain (01/22/2024), Chronic systolic heart failure (01/22/2024), Coronary arteriosclerosis (01/22/2024), Essential hypertension (01/22/2024), Hodgkin lymphoma (01/22/2024), replacement of aortic valve (01/22/2024), Mixed hyperlipidemia (02/23/2005), Pleural effusion " (01/22/2024), Shortness of breath (01/22/2024), Type 2 diabetes mellitus without complication (Multi) (08/02/2023), and Venous thrombosis (01/22/2024).    Past Surgical History:  He has a past surgical history that includes CT guided chest tube placement (7/25/2018).      Social History:  He reports that he has never smoked. He has never been exposed to tobacco smoke. He has never used smokeless tobacco. He reports current alcohol use of about 6.0 standard drinks of alcohol per week. He reports that he does not use drugs.    Family History:  Family History   Problem Relation Name Age of Onset    No Known Problems Mother      No Known Problems Father          Allergies:  Phenobarbital    Inpatient Medications:  Scheduled medications   Medication Dose Route Frequency    aspirin  81 mg oral Every other day    atorvastatin  80 mg oral Daily    levothyroxine  25 mcg oral Daily before breakfast    metoprolol tartrate  25 mg oral BID    pantoprazole  40 mg oral Daily before breakfast    PARoxetine  10 mg oral Daily before breakfast    piperacillin-tazobactam  3.375 g intravenous q6h JENNIFER    potassium chloride  20 mEq intravenous Once    vancomycin  1,500 mg intravenous q24h     PRN medications   Medication    acetaminophen    albuterol    benzocaine-menthol    dextromethorphan-guaifenesin    guaiFENesin    heparin (porcine)    hydrOXYzine pamoate    vancomycin     Continuous Medications   Medication Dose Last Rate    heparin  0-4,500 Units/hr 1,200 Units/hr (11/08/24 0949)    sodium chloride 0.9%  75 mL/hr 75 mL/hr (11/08/24 1110)     Outpatient Medications:  Current Outpatient Medications   Medication Instructions    albuterol (Ventolin HFA) 90 mcg/actuation inhaler 2 puffs, inhalation, Every 4 hours PRN    aspirin 81 mg, Every other day    atorvastatin (LIPITOR) 80 mg, oral, Daily    clopidogrel (PLAVIX) 75 mg, oral, Daily    hydrOXYzine pamoate (VISTARIL) 25 mg, oral, 3 times daily PRN    levothyroxine (SYNTHROID,  LEVOXYL) 25 mcg, oral, Daily before breakfast    lisinopril 5 mg, Daily    metFORMIN (OSM) (FORTAMET) 500 mg, Daily with evening meal    metoprolol tartrate (LOPRESSOR) 25 mg, oral, 2 times daily (morning and late afternoon)    multivitamin tablet 1 tablet, Daily    pantoprazole (PROTONIX) 40 mg, oral, Daily before breakfast    PARoxetine (PAXIL) 10 mg, oral, Daily before breakfast    promethazine-DM (Phenergan-DM) 6.25-15 mg/5 mL syrup 5 mL, oral, Every 4 hours PRN    sildenafil (VIAGRA) 50 mg, oral, As needed    torsemide (DEMADEX) 100 mg, Daily       Physical Exam:  The patient is a nonobese middle-aged white male lying in bed appearing relatively comfortable.  He is in no respiratory distress on room air.  He has occasional paroxysms of dry coughing.  No visible painful distress.  JVP not elevated carotid and pulses 2+  Chest fair somewhat diminished air movement breath sounds clear  Cardiac rhythm is regular no premature beats S1-S2 normal no gallop or rub minimal systolic ejection murmur.  Abdomen soft not focally tender  No peripheral edema pedal pulses present     Assessment/Plan   11/8: The patient is a 59-year-old white male with a relatively extensive past medical history including non-Hodgkin's lymphoma treated with mediastinal radiation and chemotherapy, premature coronary artery disease with CABG times 4/2007 in situ LIMA to LAD saphenous vein graft to PDA sequential radial artery to diagonal branch and obtuse marginal branch, redo sternotomy with CABG x 1 SVG to old RCA SVG and aortic valve replacement for critical aortic stenosis using 23 mm Saint Markel trifecta valve.  The patient subsequently developed a recurrent loculated right-sided pleural effusion with lung entrapment and had a right sided lung decortication 11/29/2018.  He was admitted with non-STEMI on 6/26/2020 and required PCI to drug-eluting stents to the RCA for a failed saphenous vein graft.  Patient has not felt well for the past 1  month with persistent cough nonproductive not responding to antibiotic therapy.  CT of the chest on 10/7/2024 negative for pulmonary embolism.  Patient presented because of ongoing generalized malaise weakness weight loss intermittent fever and occasional hemoptysis.  Repeat CT angiogram of the chest now positive for pulmonary embolization.  There is also some concern for partial collapse of the right middle lobe and a bronchoscopy has been suggested to rule out endobronchial lesion.  Preliminary review of echocardiogram does demonstrate moderate LV chamber dilatation hypokinesis free wall right ventricle flattening of interventricular septum consistent with right ventricular pressure overload although the preliminary estimate of the PA systolic pressure is 35 mmHg.  Patient has been started on IV heparin which will be continued at least for 48 hours.  If the patient is considered for a bronchoscopy then the IV heparin may need to be continued until that procedure has been performed before committing to converting into an oral DOAC.  Patient currently on IV Zosyn and vancomycin for possible multifocal pneumonia.  He will remain on preadmission aspirin but will hold Plavix for now.  Will continue low-dose metoprolol plus high intensity atorvastatin.    Peripheral IV 11/07/24 20 G Right Antecubital (Active)   Site Assessment Clean;Dry;Intact 11/07/24 2100   Dressing Status Clean;Dry 11/07/24 2100   Number of days: 1       Peripheral IV 11/07/24 20 G Right;Anterior Forearm (Active)   Site Assessment Clean;Dry;Intact 11/07/24 2100   Dressing Status Clean;Dry 11/07/24 2100   Number of days: 1       Code Status:  Full Code    I spent  minutes in the professional and overall care of this patient.        Devon Simmons MD

## 2024-11-08 NOTE — PROGRESS NOTES
11/08/24 1741   [facility] Disability Status   Are you deaf or do you have serious difficulty hearing? N   Are you blind or do you have serious difficulty seeing, even when wearing glasses? N   Because of a physical, mental, or emotional condition, do you have serious difficulty concentrating, remembering, or making decisions? (5 years old or older) N   Do you have serious difficulty walking or climbing stairs? N   Do you have serious difficulty dressing or bathing? N   Because of a physical, mental, or emotional condition, do you have serious difficulty doing errands alone such as visiting the doctor? N

## 2024-11-08 NOTE — H&P
History Of Present Illness      Carl Vazquez is a 59 y.o. male presenting with Progressive Generalized weakness and cough with reddish brown expectoration x several weeks.    I met the patient and his wife at the bedside.  They are both historians sickly tell me that starting exactly on October 7th, 2024 the patient presented to his PCP with chief complaint of coughing spells and expectoration.  At that time PCP prescribed a Z-Alejandro.  At first patient's symptoms improved but subsequently continued throughout the several weeks.  Upon reevaluation with his primary care doctor he obtained a chest x-ray as an outpatient.  Ultimately the PCP recommended that he come to the hospital for further workup.  The patient and his wife also tell me that there have been days where he is completely stagnant and immobile at home.  Been extremely weak and has been losing weight as well.  He attributes his weight loss to his torsemide medication but then his wife also states he has not been having an appetite recently.  He has extensive history of CABG x 4/R pulmonary decortication/aortic valve replacement performed by Dr. Shepard.  He had recurrent pleural effusions in the past.  These have always been concentrated on the right side of the lung.      Patient's ED diagnostic workup noted for a marked leukocytosis of 17.2.  H&H was low normal at 13.1/38, respectively.  His platelet count was normal at 344.  There is neutrophil predominance.  His blood chemistry is noted for a sodium of 133.  He has an CHRIS with a creatinine of 1.41.  Open was low at 3.0.  Alkaline phosphatase elevated to 59.  Total bilirubin elevated 2.7.  His BNP is elevated 1095.  Cardiac enzyme level elevated 182 followed by 151, respectively.  His urinalysis was bland.      Patient underwent a CT abdomen pelvis with IV contrast and a CT angio chest for pulmonary embolism evaluation.      IMPRESSION:  1. Borderline hepatomegaly and trace perihepatic ascites.  Correlate  with LFTs.  2. Mild gallbladder wall thickening in the setting of  underdistention. Correlate for clinical evidence of cholecystitis.  Ultrasound may be performed in further assessment.  3. Mild wall thickening of the hepatic flexure and mild wall  thickening and small amount of fluid of the splenic flexure. Findings  may be related to underdistention or focal colitis.  4. Splenectomy.  5. Right basilar consolidations. Small effusion or empyema in the  dependent right lung base. Ovoid consolidative focus partially  visualized on this exam with internal peripherally enhancing fluid  which could reflect an area of empyema, pulmonary abscess, or  pulmonary necrosis/necrotic lesion.      CT angiogram of the chest noted for the following:    IMPRESSION:  1. Low attenuating regions concerning for pulmonary emboli within the  distal right main pulmonary artery and segmental arteries of the  right middle and lower lobes. This may in part be related  to/accentuated by mixing artifact.  2. CT findings concerning for right heart strain. Consider  echocardiographic correlation.  3. Extensive, progressive consolidative opacities involving the right  perihilar region, right middle lobe, and right lower lobe. While this  may reflect pneumonia, superimposed malignancy is not excluded.  Pulmonary consultation is recommended.  4. Small loculated pleural effusion dependently in the right lung  base suspicious for empyema.  5. An ovoid consolidative focus laterally in the right lower/middle  lobes appears to demonstrate areas of internal complex fluid density.  Considerations include loculated effusion, region of pneumonia or  atelectasis with possible pulmonary abscess/necrosis, or a necrotic  lesion.  6. Cardiomegaly.        His EKG in the emergency room was noted for normal sinus rhythm at 80 bpm.  Right nuclear hypertrophy.  Nonspecific ST and T wave abnormality.  QTc elevated 486 ms.  Minimal T wave inversions in  lateral lead.  His initial EKG is noted for sinus rhythm with first-degree AV block.  Cannot rule out inferior infarct.      A 2D echocardiogram from July 22, 2018 was noted for an LVEF of 50 to 55%.  There is evidence of mild to moderate pulmonary hypertension.  There was mentioning of a aortic bioprosthetic valve.      Patient's vital signs upon arrival to the hospital noted for Tmax 36.8, pulse rate 90, respiratory rate was elevated 25.  Patient's blood pressure was 101/61.  Saturating 95% on room air.       In the ED the patient was administered azithromycin 500 mg 3 piggyback x 1.  Patient was given IV Zosyn 4.5 g IV piggyback x 1.  Patient was given 1 L normal saline bolus.  Patient was given a DuoNeb breathing treatment.  Patient was given Vistaril 25 mg p.o. x 1.  Patient was loaded with heparin and started on a heparin drip.        Past Medical History      Past Medical History:   Diagnosis Date    Aortic valve disorder 01/06/2015    Basal cell carcinoma 01/22/2024    Chest pain 01/22/2024    Chronic systolic heart failure 01/22/2024    Coronary arteriosclerosis 01/22/2024    Essential hypertension 01/22/2024    Hodgkin lymphoma 01/22/2024    Hx of replacement of aortic valve 01/22/2024    Mixed hyperlipidemia 02/23/2005    Pleural effusion 01/22/2024    Shortness of breath 01/22/2024    Type 2 diabetes mellitus without complication (Multi) 08/02/2023    Venous thrombosis 01/22/2024         Surgical History        Past Surgical History:   Procedure Laterality Date    CT GUIDED CHEST TUBE PLACEMENT  7/25/2018    CT GUIDED CHEST TUBE PLACEMENT [facility]     CABG x 2    AVR        Social History    He reports that he has never smoked. He has never been exposed to tobacco smoke. He has never used smokeless tobacco. He reports current alcohol use of about 6.0 standard drinks of alcohol per week. He reports that he does not use drugs.      Family History      Family History   Problem Relation Name Age  "of Onset    No Known Problems Mother      No Known Problems Father            Allergies      Phenobarbital      Review of Systems    14-point ROS otherwise negative, as per HPI/Interval History.    General: No change in weight. Yes weakenss. Yes Fevers/Chills/Night Sweats   Skin: No skin/hair/nail changes. No rashes or sores.  Head:  No trauma. No Headache/nasuea/vomitting.   Eyes: No visual changes. No tearing. No itching.   Ears: No hearing loss. No tinnitus. No vertigo. No discharge.  Nose, Sinuses: No rhinorrhea, No nasal congestion. No epistaxis.  Mouth, Throat, Neck: No bleeding gums, hoarseness, sore throat or swollen neck  Cardiac: No palpitations. No NINA. No PND. No Orthopnea.   Respiratory: Yes Shortness of Breath. No wheezing. No cough. No hemoptysis.   GI: No nausea/vomiting. No indigestion. No diarrhea. No constipation.   Extremities: No numbness or tingling. No paresthesias.   Urinary: No change in urinary frequency. No change in hesitancy. No hematuria. No incontinence.       Physical Exam        Constitutional:  Pleasant  Eyes: PERRL, EOMI,   ENMT: mucous membranes moist. Mild temporal Wasting   Head/Neck: Neck supple, No JVD,   Respiratory/Thorax: Diminished Breath sounds Right lung base.   Cardiovascular: Regular, rate and rhythm, no murmurs  Gastrointestinal: Soft, non-distended, +BS.  Musculoskeletal: ROM intact, no joint swelling, normal strength  Extremities: peripheral pulses intact; no edema  Neurological: Alert and Oriented x 3; no focal deficits; gross motor and sensation intact; CN II-XII intact. No asterixis.  Psychological: Appropriate mood and behavior  Skin: No lesions, No rashes.         Last Recorded Vitals  Blood pressure 98/65, pulse 80, temperature 37.2 °C (99 °F), temperature source Oral, resp. rate 17, height 1.803 m (5' 11\"), weight 79.4 kg (175 lb), SpO2 (!) 91%.    Relevant Results    Lab Results   Component Value Date    WBC 18.0 (H) 11/07/2024    HGB 13.0 (L) 11/07/2024    " HCT 37.7 (L) 11/07/2024    MCV 93 11/07/2024     11/07/2024       Lab Results   Component Value Date    GLUCOSE 139 (H) 11/07/2024    CALCIUM 9.0 11/07/2024     (L) 11/07/2024    K 3.5 11/07/2024    CO2 33 (H) 11/07/2024    CL 91 (L) 11/07/2024    BUN 30 (H) 11/07/2024    CREATININE 1.41 (H) 11/07/2024       Lab Results   Component Value Date    HGBA1C 7.0 (H) 07/16/2018         No CT head results found for the past 12 months      Scheduled medications  [START ON 11/8/2024] aspirin, 81 mg, oral, Every other day  [START ON 11/8/2024] atorvastatin, 80 mg, oral, Daily  [START ON 11/8/2024] levothyroxine, 25 mcg, oral, Daily before breakfast  [START ON 11/8/2024] metoprolol tartrate, 25 mg, oral, BID  [START ON 11/8/2024] pantoprazole, 40 mg, oral, Daily before breakfast  [START ON 11/8/2024] PARoxetine, 10 mg, oral, Daily before breakfast  [START ON 11/8/2024] piperacillin-tazobactam, 3.375 g, intravenous, q6h JENNIFER  vancomycin, 2 g, intravenous, Once      Continuous medications  heparin, 0-4,500 Units/hr, Last Rate: 1,400 Units/hr (11/07/24 1933)      PRN medications  PRN medications: albuterol, benzocaine-menthol, dextromethorphan-guaifenesin, guaiFENesin, heparin (porcine), hydrocodone-homatropine, hydrOXYzine pamoate, vancomycin        Assessment/Plan   Principal Problem:    Acute pulmonary embolism with acute cor pulmonale (Multi)  Active Problems:    NSTEMI (non-ST elevated myocardial infarction) (Multi)    Hodgkin lymphoma    Hyperbilirubinemia    Lung mass    Acute renal failure (ARF) (CMS-HCC)    Moderate protein-calorie malnutrition (Multi)    Acute cholecystitis    QT prolongation    Colitis    Empyema, right (Multi)    Pneumonia of right lower lobe due to infectious organism    Pleural effusion on right    Pulmonary abscess (Multi)    Leukocytosis    Alkaline phosphatase elevation    Aortic valve disorder    Dyslipidemia    Mixed hyperlipidemia    Pulmonary hypertension (Multi)    Type 2 diabetes  mellitus without complication (Multi)    Hx of replacement of aortic valve        Carl W II George is a 59 y.o. male presenting with Progressive Generalized weakness and cough with reddish brown expectoration x several weeks.  Patient admitted for further evaluation and management.        Acute Pulmonary Embolism with Right Heart Strain    Admit to stepdown unit  Continue with heparin drip  Continuous cardiac and pulse oximetry monitoring  Remains on ambient air  Will obtain bilateral lower extremity venous duplex to rule out DVT  Pulmonary consultation  Hematology consultation  It appears this may be provoked in the setting of immobility and possible lung infection  Previous history of Hodgkin's lymphoma in remission      Multifocal Right-sided  Lung Pneumonia    Continue broad-spectrum IV antibiotics  Follow-up sputum culture  Incentive spirometer  Follow-up urine streptococcal and Legionella antigen levels  Will test for COVID as well  Defer ID consult to Pulmonary Team       Presumed Right-sided Loculated Pleural Effusion/Empyema/Pulmonary Abscess/Necrosis/?Mass    Will consult thoracic surgery to evaluate the patient  Consider diagnostic thoracentesis versus VATS  Will tPA for loculated effusion  Will need to send pleural fluid analysis for cell differential, cell type, cell culture, cytology, LDH, Amylase, Glucose  Continue broad-spectrum IV antibiotics  Holding patient's Plavix therapy      NSTEMI    Possible type II physiology in the setting of above  His EKG is concerning for ischemia  He does not really have any active symptoms  Consult his cardiologist  He remains on heparin drip  And aspirin therapy      Radiographical imaging suggesting Acute Cholecystitis    Patient does not have any real abdominal tenderness  I ordered a right upper quadrant ultrasound which is not really concerning at this time  Can consider general surgery consultation if symptoms develop      Acute Renal Failure    Continue IVF  hydration   Patient's home lisinopril dose  Patient's home high torsemide dose  Patient's metformin is on hold  Continue to hold all Nephrotoxic agents  Continue to Monitor Renal Function (BUN/Cr) + Urine Output.   Will need to consider Nephrology consultation if kidney function worsens      Incidental Colitis on Imaging    Patient has no abdominal pain  PCP can refer patient for Colonoscopy to GI service as outpatient       Leukocytosis    Management as above      Prolonged Qtc    Avoid QT prolonging agents      H/o CAD s/p CABG x 4     Continue aspirin and statin therapy      History of aortic valve replacement    He has a bioprosthetic valve    Moderate Protein Calorie Malnutrition    Inpatient consultation to dietitian  Prealbumin level requested for a.m.      History of Hodgkin's lymphoma    He is in remission  Status post chemoradiation in the 1980s      Hypothyroidism    Continue home levothyroxine therapy      Depression    Continue home SSRI therapy      Hypertension    Continue to monitor BP and adjust antihypertensive medications accordingly      GI + DVT Prophylaxis    Continue patient's home oral PPI  Heparin drip        The patient's wife is extremely supportive and well aware of his past medical history.  Her name is Ms. Trista Vazquez.  She can be reached at area code [phone].        This Dictation was Transcribed using a Nuance Dragon Voice Recognition System Device (with Compatible Computer + Software) and as such may contain Grammatical Errors and Unintentional Typing Misprints.      I spent 40 minutes in the professional and overall care of this patient.      Darrel Dorantes MD

## 2024-11-08 NOTE — PROGRESS NOTES
11/08/24 1728   Discharge Planning   Living Arrangements Spouse/significant other   Support Systems Spouse/significant other   Assistance Needed none   Type of Residence Private residence   Home or Post Acute Services None   Expected Discharge Disposition Home   Does the patient need discharge transport arranged? No     Met with patient and his spouse at bedside.  An explanation of discharge planning was provided .  [place]  Patient is independent with all ADL's.  Patient is able to help with household chores.  Patient is able to drive.  Patient denies smoking and alcohol use. Denies recent falls. Patient does not require any assistive devices.  Patient does not require oxygen or cpap. Patient does not have POA. Paperwork was offered.  Patient and spouse would like to discuss it first.  Patient declines home going needs.  Plan is to discharge home with spouse when medically ready.

## 2024-11-08 NOTE — CONSULTS
Vancomycin Dosing by Pharmacy- INITIAL    Carl Vazquez is a 59 y.o. year old male who Pharmacy has been consulted for vancomycin dosing for pneumonia. Based on the patient's indication and renal status this patient will be dosed based on a goal AUC of 500-600.     Renal function is currently stable.    Visit Vitals  BP 98/65   Pulse 80   Temp 37.2 °C (99 °F) (Oral)   Resp 17        Lab Results   Component Value Date    CREATININE 1.41 (H) 2024    CREATININE 1.03 10/07/2024    CREATININE 1.21 2024    CREATININE 1.2 2023    CREATININE 1.1 2021    CREATININE 1.1 2020    CREATININE 1.1 2019        Patient weight is as follows:   Vitals:    24 1551   Weight: 79.4 kg (175 lb)       Cultures:  No results found for the encounter in last 14 days.        No intake/output data recorded.  I/O during current shift:  I/O this shift:  In: 1349 [IV Piggyback:1349]  Out: -     Temp (24hrs), Av.9 °C (98.5 °F), Min:36.8 °C (98.2 °F), Max:37.2 °C (99 °F)         Assessment/Plan     Patient will be given a loading dose of 2000 mg.  Will initiate vancomycin maintenance,  1500 mg every 24 hours.    This dosing regimen is predicted by [employer] to result in the following pharmacokinetic parameters:    Loading dose: 2000 mg at 22:00 2024.  Regimen: 1500 mg IV every 24 hours.  Start time: 22:00 on 2024  Exposure target: AUC24 (range)400-600 mg/L.hr   [id number]: 489 mg/L.hr  AUC24,ss: 546 mg/L.hr  Probability of AUC24 > 400: 81 %  Ctrough,ss: 16 mg/L  Probability of Ctrough,ss > 20: 32 %    Follow-up level will be ordered on  at 0500 unless clinically indicated sooner.  Will continue to monitor renal function daily while on vancomycin and order serum creatinine at least every 48 hours if not already ordered.  Follow for continued vancomycin needs, clinical response, and signs/symptoms of toxicity.       Dior Harrington [facility]

## 2024-11-08 NOTE — CONSULTS
Inpatient consult to Pulmonology  Consult performed by: Candelario Lynn MD  Consult ordered by: Darrel Dorantes MD          Pulmonary Consult    Reason For Consult  Weakness cough not feeling well.  History Of Present Illness  Carl Vazquez is a 59 y.o. male presenting with Acute pulmonary embolism with acute cor pulmonale (Multi).  Patient with his PCP in early October.  October 7 not feeling well.  Some minor coughing and sputum production.  That time prescribed a Z-Alejandro.  States that symptoms did not improved.  He has not no other course of antibiotics as well over the past couple weeks.  Went saw his PCP earlier this week and had a chest x-ray done.  Ultimately after several days patient was recommended to come to the ER for evaluation.  States he is feels very weak and has been losing weight.  He also has been taking his torsemide she felt like was causing his weight loss.  Severe extensive history of four-vessel CABG and also a decortication on the right side after aortic valve replacement performed Dr. Monzon.  History of recurrent pleural effusions in the past.    White count 17.2 in the ER.  Creatinine is 1.41 which is worse from baseline.     Past Medical History  He has a past medical history of Aortic valve disorder (01/06/2015), Basal cell carcinoma (01/22/2024), Chest pain (01/22/2024), Chronic systolic heart failure (01/22/2024), Coronary arteriosclerosis (01/22/2024), Essential hypertension (01/22/2024), Hodgkin lymphoma (01/22/2024), replacement of aortic valve (01/22/2024), Mixed hyperlipidemia (02/23/2005), Pleural effusion (01/22/2024), Shortness of breath (01/22/2024), Type 2 diabetes mellitus without complication (Multi) (08/02/2023), and Venous thrombosis (01/22/2024).    Surgical History  He has a past surgical history that includes CT guided chest tube placement (7/25/2018).     Social History  He reports that he has never smoked. He has never been exposed to tobacco smoke. He has never  "used smokeless tobacco. He reports current alcohol use of about 6.0 standard drinks of alcohol per week. He reports that he does not use drugs.      Family History  Family History   Problem Relation Name Age of Onset    No Known Problems Mother      No Known Problems Father          Allergies  Phenobarbital    Review of Systems   All other systems reviewed and are negative.      Last Recorded Vitals  Blood pressure 106/59, pulse 94, temperature 36.8 °C (98.2 °F), temperature source Temporal, resp. rate 16, height 1.803 m (5' 11\"), weight 80.2 kg (176 lb 12.9 oz), SpO2 93%.    Intake/Output    Intake/Output Summary (Last 24 hours) at 11/8/2024 1130  Last data filed at 11/8/2024 0528  Gross per 24 hour   Intake 1749 ml   Output 740 ml   Net 1009 ml       Physical Exam  Vitals reviewed.   Constitutional:       Appearance: Normal appearance.   HENT:      Head: Normocephalic and atraumatic.      Mouth/Throat:      Mouth: Mucous membranes are moist.   Eyes:      Extraocular Movements: Extraocular movements intact.      Pupils: Pupils are equal, round, and reactive to light.   Cardiovascular:      Rate and Rhythm: Normal rate and regular rhythm.   Pulmonary:      Effort: Pulmonary effort is normal.      Breath sounds: Normal air entry. Examination of the right-lower field reveals decreased breath sounds. Decreased breath sounds present.   Abdominal:      General: Abdomen is flat. Bowel sounds are normal.      Palpations: Abdomen is soft.   Musculoskeletal:         General: Normal range of motion.      Cervical back: Normal range of motion and neck supple.   Skin:     General: Skin is warm and dry.   Neurological:      General: No focal deficit present.      Mental Status: He is alert and oriented to person, place, and time. Mental status is at baseline.      ...    Oxygen Therapy  SpO2: 93 %  Medical Gas Therapy: None (Room air)          Lines and Tubes:  Peripheral IV 11/07/24 20 G Right Antecubital (Active)   Placement " Date/Time: 11/07/24 1903   Hand Hygiene Completed: Yes  Size (Gauge): 20 G  Orientation: Right  Location: Antecubital   Number of days: 0       Peripheral IV 11/07/24 20 G Right;Anterior Forearm (Active)   Placement Date/Time: 11/07/24 1923   Hand Hygiene Completed: Yes  Size (Gauge): 20 G  Orientation: Right;Anterior  Location: Forearm  Site Prep: Chlorhexidine    Number of days: 0         Scheduled medications  aspirin, 81 mg, oral, Every other day  atorvastatin, 80 mg, oral, Daily  levothyroxine, 25 mcg, oral, Daily before breakfast  metoprolol tartrate, 25 mg, oral, BID  pantoprazole, 40 mg, oral, Daily before breakfast  PARoxetine, 10 mg, oral, Daily before breakfast  piperacillin-tazobactam, 3.375 g, intravenous, q6h JENNIFER  potassium chloride, 20 mEq, intravenous, Once  vancomycin, 1,500 mg, intravenous, q24h      Continuous medications  heparin, 0-4,500 Units/hr, Last Rate: 1,200 Units/hr (11/08/24 0949)  sodium chloride 0.9%, 75 mL/hr, Last Rate: 75 mL/hr (11/08/24 1110)      PRN medications  PRN medications: acetaminophen, albuterol, benzocaine-menthol, dextromethorphan-guaifenesin, guaiFENesin, heparin (porcine), hydrOXYzine pamoate, vancomycin    Relevant Results  Results from last 7 days   Lab Units 11/08/24  0445 11/07/24  1931 11/07/24  1644   WBC AUTO x10*3/uL 17.5* 18.0* 17.2*   HEMOGLOBIN g/dL 12.5* 13.0* 13.1*   HEMATOCRIT % 34.7* 37.7* 38.0*   PLATELETS AUTO x10*3/uL 325 352 344      Results from last 7 days   Lab Units 11/08/24  0445 11/07/24  1644   SODIUM mmol/L 132* 133*   POTASSIUM mmol/L 2.8* 3.5   CHLORIDE mmol/L 92* 91*   CO2 mmol/L 28 33*   BUN mg/dL 28* 30*   CREATININE mg/dL 1.20 1.41*   GLUCOSE mg/dL 93 139*   CALCIUM mg/dL 8.3* 9.0          CT angio chest for pulmonary embolism 11/07/2024    Narrative  Interpreted By:  Sushil Isabel,  STUDY:  CT ANGIO CHEST FOR PULMONARY EMBOLISM;  11/7/2024 6:16 pm    INDICATION:  Signs/Symptoms:elevated troponin, shortness of  breath.      COMPARISON:  CTA chest for PE 10/07/2024    ACCESSION NUMBER(S):  [id number]    ORDERING CLINICIAN:  LAURA CHAVARRIA    TECHNIQUE:  Contiguous axial images of the chest were obtained after the  intravenous administration of 75 mL Omnipaque 350 contrast using  angiographic PE protocol. Coronal and sagittal reformatted images  were reconstructed from the axial data. MIP images were created on an  independent workstation and reviewed.    FINDINGS:  PULMONARY ARTERIES: Adequate opacification to the level of the  segmental arteries. Ill-defined areas of low-attenuation within the  distal right main pulmonary artery as well as throughout the proximal  segmental arteries of the right middle and lower lobes. While some of  this may be related to mixing artifact, the overall appearance is  concerning for pulmonary embolus. This is particularly true within  the distal right main pulmonary artery (series 5, image 135 segmental  arteries of the right middle lobe (series 5, image 150). The main  pulmonary artery is normal in diameter. Right ventricular to left  ventricular ratio of roughly 1.5 concerning for right heart strain.    HEART: Heart is enlarged. Triple-vessel coronary vascular  calcifications. Calcifications in the plane of the aortic and mitral  valves. No significant pericardial effusion.    VESSELS: No aortic aneurysm. Assessment for aortic patency limited by  phase of contrast enhancement. Moderate calcifications of the  thoracic aorta.    MEDIASTINUM AND LYMPH NODES: Multiple nodules within the thyroid,  greatest on the right. This may be further assessed with outpatient  ultrasound. Prominent mediastinal lymph nodes including a 1.1 cm  right paratracheal node and 1.4 cm subcarinal node. No  pneumomediastinum. The esophagus appears within normal limits.    LUNG, AIRWAYS, AND PLEURA: The trachea and proximal mainstem bronchi  are patent. Extensive, progressive consolidations are  noted  throughout the right lung, most pronounced within the perihilar  region, right middle lobe, and right lower lobe. There appears to be  narrowing and effacement of the right lower lobe bronchi. Multiple  air bronchograms are noted within the right lower lobe consolidation.  Loculated right lower lobe effusion measuring up to 7.9 cm with  surrounding wall thickening suggesting possible empyema.  Additionally, there is a 10.2 cm ovoid consolidative focus laterally  at the right lung base (series 5 image 193). Which appears to  demonstrate areas of complex fluid density. An additional loculated  effusion or possibly pulmonary abscess not excluded.    OSSEOUS STRUCTURES: No acute osseous abnormality. Median sternotomy  changes.    CHEST WALL SOFT TISSUES: No discernible abnormality.    UPPER ABDOMEN/OTHER: Please see separately dictated CT of the abdomen  and pelvis.    Impression  1. Low attenuating regions concerning for pulmonary emboli within the  distal right main pulmonary artery and segmental arteries of the  right middle and lower lobes. This may in part be related  to/accentuated by mixing artifact.  2. CT findings concerning for right heart strain. Consider  echocardiographic correlation.  3. Extensive, progressive consolidative opacities involving the right  perihilar region, right middle lobe, and right lower lobe. While this  may reflect pneumonia, superimposed malignancy is not excluded.  Pulmonary consultation is recommended.  4. Small loculated pleural effusion dependently in the right lung  base suspicious for empyema.  5. An ovoid consolidative focus laterally in the right lower/middle  lobes appears to demonstrate areas of internal complex fluid density.  Considerations include loculated effusion, region of pneumonia or  atelectasis with possible pulmonary abscess/necrosis, or a necrotic  lesion.  6. Cardiomegaly.    MACRO:  Sushil Isabel discussed the significance and urgency of this  critical  finding by telephone with  LAURA GENEVA CHAVARRIA on 11/7/2024 at 6:32  pm.  (**-RCF-**) Findings:  See findings.    Signed by: Sushil Isabel 11/7/2024 6:37 PM  Dictation workstation:   [id number]      Assessment/Plan   Assessment & Plan  Acute pulmonary embolism with acute cor pulmonale (Multi)    Aortic valve disorder    Dyslipidemia    Hodgkin lymphoma    Hyperbilirubinemia    Mixed hyperlipidemia    Lung mass    Pulmonary hypertension (Multi)    Type 2 diabetes mellitus without complication (Multi)    Hx of replacement of aortic valve    NSTEMI (non-ST elevated myocardial infarction) (Multi)    Acute renal failure (ARF) (CMS-HCC)    Moderate protein-calorie malnutrition (Multi)    Acute cholecystitis    QT prolongation    Colitis    Empyema, right (Multi)    Pneumonia of right lower lobe due to infectious organism    Pleural effusion on right    Pulmonary abscess (Multi)    Leukocytosis    Alkaline phosphatase elevation      CT of the chest  1. Low attenuating regions concerning for pulmonary emboli within the distal right main pulmonary artery and segmental arteries of the   right middle and lower lobes. This may in part be related   to/accentuated by mixing artifact.   2. CT findings concerning for right heart strain. Consider   echocardiographic correlation.   3. Extensive, progressive consolidative opacities involving the right   perihilar region, right middle lobe, and right lower lobe. While this   may reflect pneumonia, superimposed malignancy is not excluded.   Pulmonary consultation is recommended.   4. Small loculated pleural effusion dependently in the right lung base suspicious for empyema.   5. An ovoid consolidative focus laterally in the right lower/middle lobes appears to demonstrate areas of internal complex fluid density.   Considerations include loculated effusion, region of pneumonia or   atelectasis with possible pulmonary abscess/necrosis, or a necrotic   lesion.   6.  Cardiomegaly.     CT of the chest reviewed.  Concern for abscess right lower lobe.  IR consulted for potential pigtail drain.  Continue IV antibiotics and supportive care  Continue IV fluids  Resp treatments  Wean oxygen as tolerated  Discussed with patient CT findings.  I did relay to him that bronchoscopy may be necessary to rule out endobronchial lesion causing collapse on the right side.  This time I would like to treat with antibiotics over the weekend as well as blood thinners to give him some time to let PE resolve and if his white count improved before beginning bronchoscopy and potential biopsy which require him to be off of heparin.    IR consult pending  IV fluids  Antibiotics  Prophylaxis    Thank you for consultation      Candelario Lynn MD

## 2024-11-08 NOTE — NURSING NOTE
Skin checked by Frances ARCEO and myself. Blanchable redness to buttocks from sitting in ED. No other skin issues. Skin intact.

## 2024-11-08 NOTE — PROGRESS NOTES
This patient was seen by the advanced practice provider.  I have personally performed a substantive portion of the encounter.  I have seen and examined the patient; agree with the workup, evaluation, MDM, management and diagnosis. The care plan has been discussed.      I personally saw the patient and made/approved the management plan and take responsibility for the patient management.    History:  59-year-old male presents with general malaise.  Patient was seen by me 1 month ago and diagnosed with multifocal pneumonia.  He was given a prescription for doxycycline and discharged home.  Patient states he felt initially better but then was exposed to his sick grandchild who had an upper respiratory infection.  He was then seen by his primary care doctor and had azithromycin prescribed him.  He states the azithromycin did not help his symptoms and continues to feel ill.  He called his doctor again who instructed he come Emergency Department for reevaluation.  No chest pain or shortness of breath but states he has had a persistent cough.    Exam:  General Appearance: No acute distress  HENT: Normocephalic, atraumatic  Eye: PERRL, EOMI  Neck: Supple, full range of motion  Respiratory: Coarse breath sounds bilaterally with diminished breath sounds in the right lower lobe  Cardiovascular: RRR, no murmurs  GI: Soft, nontender, nondistended  MSK: no cyanosis, no deformities, normal range of motion  Neuro:  AAOx3, Nonfocal  Skin: Clean, dry, intact  Behavioral: appropriate, cooperative    MDM:  59-year-old male presents with general malaise.  Considered pneumonia, pleural effusion, PE.  Patient with known complex pulmonary history including chronic pleural effusion with a lung that did not properly reinflate.  Labs ordered for evaluation and repeat chest x-ray ordered.  Patient found to have a new leukocytosis of 17.2.  Also with mild CHRIS with creatinine of 1.4.  Initial troponin 182 with repeat at 151.  BNP also elevated to  1000.  Chest x-ray with increased opacification of the right lower lobe.  Given elevated troponins and change in chest x-ray, CT PE reordered for comparison from PE study performed 1 month ago.  CT PE returned with concern for acute pulmonary embolism as well as possible empyema.  CT abdomen pelvis was also added to the study, no acute abnormalities in the abdomen.  Spoke with pulmonology who will evaluate the patient while in the hospital.  At this time is cleared to be admitted to the stepdown unit from a pulmonology perspective.  Patient is breathing comfortably on room air.  Started on a heparin infusion and given broad-spectrum antibiotics with blood culture sent.  Patient admitted to hospitalist in stable condition.    I personally spent a total of 50 minutes of critical care time in obtaining history, performing a physical exam, bedside monitoring of interventions, collecting and interpreting tests and discussion with consultants but excluding time spent performing procedures, treating other patients and teaching time.           Clinical Concern sepsis, empyema         FINAL IMPRESSION      1. Other acute pulmonary embolism without acute cor pulmonale    2. Empyema (Multi)    3. Pneumonia of right lower lobe due to infectious organism    4. Hx of replacement of aortic valve          DISPOSITION    Admit 11/07/2024 08:40:15 PM

## 2024-11-09 PROBLEM — R73.03 PREDIABETES: Status: ACTIVE | Noted: 2024-11-09

## 2024-11-09 LAB
ALBUMIN SERPL BCP-MCNC: 2.8 G/DL (ref 3.4–5)
ALP SERPL-CCNC: 221 U/L (ref 33–120)
ALT SERPL W P-5'-P-CCNC: 12 U/L (ref 10–52)
ANION GAP SERPL CALCULATED.3IONS-SCNC: 14 MMOL/L (ref 10–20)
APTT PPP: 67.1 SECONDS (ref 22–32.5)
AST SERPL W P-5'-P-CCNC: 20 U/L (ref 9–39)
BILIRUB SERPL-MCNC: 2.4 MG/DL (ref 0–1.2)
BUN SERPL-MCNC: 25 MG/DL (ref 6–23)
CALCIUM SERPL-MCNC: 8.6 MG/DL (ref 8.6–10.3)
CHLORIDE SERPL-SCNC: 98 MMOL/L (ref 98–107)
CO2 SERPL-SCNC: 28 MMOL/L (ref 21–32)
CREAT SERPL-MCNC: 1.24 MG/DL (ref 0.5–1.3)
EGFRCR SERPLBLD CKD-EPI 2021: 67 ML/MIN/1.73M*2
ERYTHROCYTE [DISTWIDTH] IN BLOOD BY AUTOMATED COUNT: 15.3 % (ref 11.5–14.5)
ERYTHROCYTE [DISTWIDTH] IN BLOOD BY AUTOMATED COUNT: 15.3 % (ref 11.5–14.5)
GLUCOSE SERPL-MCNC: 104 MG/DL (ref 74–99)
HCT VFR BLD AUTO: 36.7 % (ref 41–52)
HCT VFR BLD AUTO: 38.3 % (ref 41–52)
HGB BLD-MCNC: 12.8 G/DL (ref 13.5–17.5)
HGB BLD-MCNC: 12.9 G/DL (ref 13.5–17.5)
LEGIONELLA AG UR QL: NEGATIVE
MAGNESIUM SERPL-MCNC: 1.82 MG/DL (ref 1.6–2.4)
MCH RBC QN AUTO: 31.7 PG (ref 26–34)
MCH RBC QN AUTO: 32.3 PG (ref 26–34)
MCHC RBC AUTO-ENTMCNC: 33.7 G/DL (ref 32–36)
MCHC RBC AUTO-ENTMCNC: 34.9 G/DL (ref 32–36)
MCV RBC AUTO: 93 FL (ref 80–100)
MCV RBC AUTO: 94 FL (ref 80–100)
NRBC BLD-RTO: 0.1 /100 WBCS (ref 0–0)
NRBC BLD-RTO: 0.2 /100 WBCS (ref 0–0)
PHOSPHATE SERPL-MCNC: 3.1 MG/DL (ref 2.5–4.9)
PLATELET # BLD AUTO: 377 X10*3/UL (ref 150–450)
PLATELET # BLD AUTO: 378 X10*3/UL (ref 150–450)
POTASSIUM SERPL-SCNC: 3.5 MMOL/L (ref 3.5–5.3)
PROT SERPL-MCNC: 6.7 G/DL (ref 6.4–8.2)
RBC # BLD AUTO: 3.96 X10*6/UL (ref 4.5–5.9)
RBC # BLD AUTO: 4.07 X10*6/UL (ref 4.5–5.9)
S PNEUM AG UR QL: NEGATIVE
SODIUM SERPL-SCNC: 136 MMOL/L (ref 136–145)
WBC # BLD AUTO: 15 X10*3/UL (ref 4.4–11.3)
WBC # BLD AUTO: 16 X10*3/UL (ref 4.4–11.3)

## 2024-11-09 PROCEDURE — 2500000001 HC RX 250 WO HCPCS SELF ADMINISTERED DRUGS (ALT 637 FOR MEDICARE OP): Performed by: INTERNAL MEDICINE

## 2024-11-09 PROCEDURE — 84550 ASSAY OF BLOOD/URIC ACID: CPT | Performed by: INTERNAL MEDICINE

## 2024-11-09 PROCEDURE — 2500000004 HC RX 250 GENERAL PHARMACY W/ HCPCS (ALT 636 FOR OP/ED): Performed by: INTERNAL MEDICINE

## 2024-11-09 PROCEDURE — 83735 ASSAY OF MAGNESIUM: CPT | Performed by: INTERNAL MEDICINE

## 2024-11-09 PROCEDURE — 2500000004 HC RX 250 GENERAL PHARMACY W/ HCPCS (ALT 636 FOR OP/ED): Performed by: PHYSICIAN ASSISTANT

## 2024-11-09 PROCEDURE — 84100 ASSAY OF PHOSPHORUS: CPT | Performed by: INTERNAL MEDICINE

## 2024-11-09 PROCEDURE — 2500000002 HC RX 250 W HCPCS SELF ADMINISTERED DRUGS (ALT 637 FOR MEDICARE OP, ALT 636 FOR OP/ED): Performed by: INTERNAL MEDICINE

## 2024-11-09 PROCEDURE — 83036 HEMOGLOBIN GLYCOSYLATED A1C: CPT | Mod: WESLAB | Performed by: INTERNAL MEDICINE

## 2024-11-09 PROCEDURE — 80053 COMPREHEN METABOLIC PANEL: CPT | Performed by: INTERNAL MEDICINE

## 2024-11-09 PROCEDURE — 99232 SBSQ HOSP IP/OBS MODERATE 35: CPT | Performed by: INTERNAL MEDICINE

## 2024-11-09 PROCEDURE — 36415 COLL VENOUS BLD VENIPUNCTURE: CPT | Performed by: INTERNAL MEDICINE

## 2024-11-09 PROCEDURE — 85730 THROMBOPLASTIN TIME PARTIAL: CPT | Performed by: PHYSICIAN ASSISTANT

## 2024-11-09 PROCEDURE — 85027 COMPLETE CBC AUTOMATED: CPT | Performed by: INTERNAL MEDICINE

## 2024-11-09 PROCEDURE — 36415 COLL VENOUS BLD VENIPUNCTURE: CPT | Performed by: PHYSICIAN ASSISTANT

## 2024-11-09 PROCEDURE — 2060000001 HC INTERMEDIATE ICU ROOM DAILY

## 2024-11-09 PROCEDURE — 99233 SBSQ HOSP IP/OBS HIGH 50: CPT | Performed by: INTERNAL MEDICINE

## 2024-11-09 PROCEDURE — 85027 COMPLETE CBC AUTOMATED: CPT | Performed by: PHYSICIAN ASSISTANT

## 2024-11-09 RX ORDER — LORAZEPAM 2 MG/ML
2 INJECTION INTRAMUSCULAR ONCE AS NEEDED
Status: COMPLETED | OUTPATIENT
Start: 2024-11-09 | End: 2024-11-09

## 2024-11-09 RX ORDER — SODIUM CHLORIDE AND POTASSIUM CHLORIDE 150; 900 MG/100ML; MG/100ML
100 INJECTION, SOLUTION INTRAVENOUS CONTINUOUS
Status: DISCONTINUED | OUTPATIENT
Start: 2024-11-09 | End: 2024-11-10

## 2024-11-09 ASSESSMENT — COGNITIVE AND FUNCTIONAL STATUS - GENERAL
MOBILITY SCORE: 21
TURNING FROM BACK TO SIDE WHILE IN FLAT BAD: A LITTLE
CLIMB 3 TO 5 STEPS WITH RAILING: A LITTLE
EATING MEALS: A LITTLE
MOBILITY SCORE: 21
DAILY ACTIVITIY SCORE: 20
DRESSING REGULAR LOWER BODY CLOTHING: A LITTLE
MOVING FROM LYING ON BACK TO SITTING ON SIDE OF FLAT BED WITH BEDRAILS: A LITTLE
DAILY ACTIVITIY SCORE: 18
DRESSING REGULAR UPPER BODY CLOTHING: A LITTLE
DRESSING REGULAR LOWER BODY CLOTHING: A LITTLE
EATING MEALS: A LITTLE
PERSONAL GROOMING: A LITTLE
WALKING IN HOSPITAL ROOM: A LITTLE
STANDING UP FROM CHAIR USING ARMS: A LITTLE
DRESSING REGULAR LOWER BODY CLOTHING: A LITTLE
HELP NEEDED FOR BATHING: A LITTLE
CLIMB 3 TO 5 STEPS WITH RAILING: A LITTLE
DAILY ACTIVITIY SCORE: 18
PERSONAL GROOMING: A LITTLE
TOILETING: A LITTLE
DRESSING REGULAR UPPER BODY CLOTHING: A LITTLE
HELP NEEDED FOR BATHING: A LITTLE
STANDING UP FROM CHAIR USING ARMS: A LITTLE
HELP NEEDED FOR BATHING: A LITTLE
MOVING TO AND FROM BED TO CHAIR: A LITTLE
PERSONAL GROOMING: A LITTLE
WALKING IN HOSPITAL ROOM: A LITTLE
CLIMB 3 TO 5 STEPS WITH RAILING: A LITTLE
TOILETING: A LITTLE
MOBILITY SCORE: 18
TOILETING: A LITTLE
STANDING UP FROM CHAIR USING ARMS: A LITTLE
WALKING IN HOSPITAL ROOM: A LITTLE

## 2024-11-09 ASSESSMENT — PAIN SCALES - GENERAL
PAINLEVEL_OUTOF10: 0 - NO PAIN

## 2024-11-09 ASSESSMENT — PAIN - FUNCTIONAL ASSESSMENT
PAIN_FUNCTIONAL_ASSESSMENT: FLACC (FACE, LEGS, ACTIVITY, CRY, CONSOLABILITY)
PAIN_FUNCTIONAL_ASSESSMENT: FLACC (FACE, LEGS, ACTIVITY, CRY, CONSOLABILITY)
PAIN_FUNCTIONAL_ASSESSMENT: 0-10

## 2024-11-09 NOTE — CARE PLAN
Problem: Pain - Adult  Goal: Verbalizes/displays adequate comfort level or baseline comfort level  Outcome: Progressing     Problem: Safety - Adult  Goal: Free from fall injury  Outcome: Progressing     Problem: Discharge Planning  Goal: Discharge to home or other facility with appropriate resources  Outcome: Progressing     Problem: Chronic Conditions and Co-morbidities  Goal: Patient's chronic conditions and co-morbidity symptoms are monitored and maintained or improved  Outcome: Progressing     Problem: Nutrition  Goal: Oral intake greater 75%  Outcome: Progressing  Goal: Consume prescribed supplement  Outcome: Progressing     Problem: Skin  Goal: Decreased wound size/increased tissue granulation at next dressing change  11/9/2024 1614 by Selena Villar RN  Outcome: Progressing  Flowsheets (Taken 11/9/2024 1613)  Decreased wound size/increased tissue granulation at next dressing change: Protective dressings over bony prominences  11/9/2024 1613 by Selena Villar RN  Outcome: Progressing  Flowsheets (Taken 11/9/2024 1613)  Decreased wound size/increased tissue granulation at next dressing change: Protective dressings over bony prominences  11/9/2024 1042 by Selena Villar RN  Outcome: Progressing  Flowsheets (Taken 11/8/2024 2000 by Anna Arana RN)  Decreased wound size/increased tissue granulation at next dressing change: Protective dressings over bony prominences  11/9/2024 1025 by Selena Villar RN  Outcome: Progressing  Goal: Participates in plan/prevention/treatment measures  11/9/2024 1614 by Selena Villar RN  Outcome: Progressing  Flowsheets (Taken 11/8/2024 2000 by Anna Arana, RN)  Participates in plan/prevention/treatment measures:   Discuss with provider PT/OT consult   Elevate heels   Increase activity/out of bed for meals  11/9/2024 1025 by Selena Villar RN  Outcome: Progressing  Goal: Prevent/manage excess moisture  11/9/2024 1614 by Selena Villar RN  Outcome:  Progressing  Flowsheets (Taken 11/8/2024 2000 by Anna Arana RN)  Prevent/manage excess moisture: Moisturize dry skin  11/9/2024 1025 by Selena Villar RN  Outcome: Progressing  Goal: Prevent/minimize sheer/friction injuries  11/9/2024 1614 by Selena Villar RN  Outcome: Progressing  Flowsheets (Taken 11/8/2024 2000 by Anna Arana RN)  Prevent/minimize sheer/friction injuries:   Increase activity/out of bed for meals   HOB 30 degrees or less   Turn/reposition every 2 hours/use positioning/transfer devices  11/9/2024 1025 by Selena Villar RN  Outcome: Progressing  Goal: Promote/optimize nutrition  11/9/2024 1614 by Selena Villar RN  Outcome: Progressing  Flowsheets (Taken 11/8/2024 2000 by Anna Arana RN)  Promote/optimize nutrition: Monitor/record intake including meals  11/9/2024 1025 by Selena Villar RN  Outcome: Progressing  Goal: Promote skin healing  11/9/2024 1614 by Selena Villar RN  Outcome: Progressing  11/9/2024 1025 by Selena Villar RN  Outcome: Progressing

## 2024-11-09 NOTE — CARE PLAN
Problem: Pain - Adult  Goal: Verbalizes/displays adequate comfort level or baseline comfort level  11/9/2024 0120 by Anna Arana RN  Outcome: Progressing  11/9/2024 0119 by Anna Arana RN  Outcome: Progressing     Problem: Safety - Adult  Goal: Free from fall injury  11/9/2024 0120 by Anna Arana RN  Outcome: Progressing  11/9/2024 0119 by Anna Arana RN  Outcome: Progressing     Problem: Discharge Planning  Goal: Discharge to home or other facility with appropriate resources  11/9/2024 0120 by Anna Arana RN  Outcome: Progressing  11/9/2024 0119 by Anna Arana RN  Outcome: Progressing     Problem: Chronic Conditions and Co-morbidities  Goal: Patient's chronic conditions and co-morbidity symptoms are monitored and maintained or improved  11/9/2024 0120 by Anna Arana RN  Outcome: Progressing  11/9/2024 0119 by Anna Arana RN  Outcome: Progressing     Problem: Nutrition  Goal: Oral intake greater 75%  11/9/2024 0120 by Anna Arana RN  Outcome: Progressing  11/9/2024 0119 by Anna Arana RN  Outcome: Progressing  Goal: Consume prescribed supplement  11/9/2024 0120 by Anna Arana RN  Outcome: Progressing  11/9/2024 0119 by Anna Arana RN  Outcome: Progressing     Problem: Skin  Goal: Decreased wound size/increased tissue granulation at next dressing change  Outcome: Progressing  Flowsheets (Taken 11/8/2024 2000)  Decreased wound size/increased tissue granulation at next dressing change: Protective dressings over bony prominences  Goal: Participates in plan/prevention/treatment measures  Outcome: Progressing  Flowsheets (Taken 11/8/2024 2000)  Participates in plan/prevention/treatment measures:   Discuss with provider PT/OT consult   Elevate heels   Increase activity/out of bed for meals  Goal: Prevent/manage excess moisture  Outcome: Progressing  Flowsheets (Taken 11/8/2024  2000)  Prevent/manage excess moisture: Moisturize dry skin  Goal: Prevent/minimize sheer/friction injuries  Outcome: Progressing  Flowsheets (Taken 11/8/2024 2000)  Prevent/minimize sheer/friction injuries:   Increase activity/out of bed for meals   HOB 30 degrees or less   Turn/reposition every 2 hours/use positioning/transfer devices  Goal: Promote/optimize nutrition  Outcome: Progressing  Flowsheets (Taken 11/8/2024 2000)  Promote/optimize nutrition: Monitor/record intake including meals  Goal: Promote skin healing  Outcome: Progressing  Flowsheets (Taken 11/8/2024 2000)  Promote skin healing:   Assess skin/pad under line(s)/device(s)   Protective dressings over bony prominences   Turn/reposition every 2 hours/use positioning/transfer devices   The patient's goals for the shift include      The clinical goals for the shift include remain hemodynamically stable    Over the shift, the patient did not make progress toward the following goals. Barriers to progression include . Recommendations to address these barriers include .

## 2024-11-09 NOTE — PROGRESS NOTES
"Carl Vazquez is a 59 y.o. male on day 2 of admission presenting with Acute pulmonary embolism with acute cor pulmonale (Multi).    Subjective   Feels well today except for a nonproductive cough which he has had       Objective     Physical Exam   Gen: NAD   Neck: no JVD, carotid upstroke is brisk and without delay   Heart: rrr, s1s2+ no mrg   Lungs: Diminished breath sounds right middle and lower fields   Ext: warm no edema  Last Recorded Vitals  Blood pressure 97/55, pulse 94, temperature 37.1 °C (98.8 °F), temperature source Temporal, resp. rate 17, height 1.803 m (5' 11\"), weight 77.6 kg (171 lb 1.2 oz), SpO2 96%.  Intake/Output last 3 Shifts:  I/O last 3 completed shifts:  In: 3988.2 (51.4 mL/kg) [P.O.:760; I.V.:1479.2 (19.1 mL/kg); IV Piggyback:1749]  Out: 1140 (14.7 mL/kg) [Urine:1140 (0.4 mL/kg/hr)]  Weight: 77.6 kg         Assessment/Plan   Assessment & Plan  Acute pulmonary embolism with acute cor pulmonale (Multi)    Aortic valve disorder    Dyslipidemia    Hodgkin lymphoma    Hyperbilirubinemia    Mixed hyperlipidemia    Lung mass    Pulmonary hypertension (Multi)    Type 2 diabetes mellitus without complication (Multi)    Hx of replacement of aortic valve    NSTEMI (non-ST elevated myocardial infarction) (Multi)    Acute renal failure (ARF) (CMS-HCC)    Moderate protein-calorie malnutrition (Multi)    Acute cholecystitis    QT prolongation    Colitis    Empyema, right (Multi)    Pneumonia of right lower lobe due to infectious organism    Pleural effusion on right    Pulmonary abscess (Multi)    Leukocytosis    Alkaline phosphatase elevation    11/8: The patient is a 59-year-old white male with a relatively extensive past medical history including non-Hodgkin's lymphoma treated with mediastinal radiation and chemotherapy, premature coronary artery disease with CABG times 4/2007 in situ LIMA to LAD saphenous vein graft to PDA sequential radial artery to diagonal branch and obtuse marginal branch, redo " sternotomy with CABG x 1 SVG to old RCA SVG and aortic valve replacement for critical aortic stenosis using 23 mm [place] [employer] trifecta valve. The patient subsequently developed a recurrent loculated right-sided pleural effusion with lung entrapment and had a right sided lung decortication 11/29/2018. He was admitted with non-STEMI on 6/26/2020 and required PCI to drug-eluting stents to the RCA for a failed saphenous vein graft. Patient has not felt well for the past 1 month with persistent cough nonproductive not responding to antibiotic therapy. CT of the chest on 10/7/2024 negative for pulmonary embolism. Patient presented because of ongoing generalized malaise weakness weight loss intermittent fever and occasional hemoptysis. Repeat CT angiogram of the chest now positive for pulmonary embolization. There is also some concern for partial collapse of the right middle lobe and a bronchoscopy has been suggested to rule out endobronchial lesion. Preliminary review of echocardiogram does demonstrate moderate LV chamber dilatation hypokinesis free wall right ventricle flattening of interventricular septum consistent with right ventricular pressure overload although the preliminary estimate of the PA systolic pressure is 35 mmHg. Patient has been started on IV heparin which will be continued at least for 48 hours. If the patient is considered for a bronchoscopy then the IV heparin may need to be continued until that procedure has been performed before committing to converting into an oral DOAC. Patient currently on IV Zosyn and vancomycin for possible multifocal pneumonia. He will remain on preadmission aspirin but will hold Plavix for now. Will continue low-dose metoprolol plus high intensity atorvastatin.     11/9: Remains hemodynamically stable, would continue heparin for therapeutic anticoagulation until after his bronchoscopy.  He is saturating well on room air.  Blood pressure and heart rate are adequate.  He does  have evidence of RV strain and dysfunction however right ventricle has been enlarged dating back to 2018.  Will continue to follow and monitor his hemodynamics.             Hill Begum, DO

## 2024-11-09 NOTE — ASSESSMENT & PLAN NOTE
-no complaint of chest pain  -continue heparin drip until bronchoscopy, will plan to convert to oral DOAC  -cardiology consult recommendations appreciated  -Hold Plavix,  continue aspirin, continue metoprolol and atorvastatin

## 2024-11-09 NOTE — CONSULTS
Reason For Consult  PE    History Of Present Illness  Carl Vazquez is a 59 y.o. male presenting with fatigue, malaise and productive cough since early Oct. He was seen by his PCP and given a Z latha. He states he felt a bit better but then continued to feel poorly. PCP ordered a cxr and suggested admission to the hospital. He was worked up and found to have a right pleural effusion with questionable PE. He is on a heparin drip per PE protocol. His past medical history includes CABG/AVR, right decortication.      Past Medical History  He has a past medical history of Aortic valve disorder (01/06/2015), Basal cell carcinoma (01/22/2024), Chest pain (01/22/2024), Chronic systolic heart failure (01/22/2024), Coronary arteriosclerosis (01/22/2024), Essential hypertension (01/22/2024), Hodgkin lymphoma (01/22/2024), replacement of aortic valve (01/22/2024), Mixed hyperlipidemia (02/23/2005), Pleural effusion (01/22/2024), Shortness of breath (01/22/2024), Type 2 diabetes mellitus without complication (Multi) (08/02/2023), and Venous thrombosis (01/22/2024).    Surgical History  He has a past surgical history that includes CT guided chest tube placement (7/25/2018).     Social History  He reports that he has never smoked. He has never been exposed to tobacco smoke. He has never used smokeless tobacco. He reports current alcohol use of about 6.0 standard drinks of alcohol per week. He reports that he does not use drugs.    Family History  Family History   Problem Relation Name Age of Onset    No Known Problems Mother      No Known Problems Father          Allergies  Phenobarbital    Review of Systems  As per in HPI     Physical Exam  Constitutional:       Normal appearance, well-developed, well-nourished.   HENT:      Head: Normocephalic and atraumatic.   Cardiovascular:      Rate and Rhythm: RRR, no murmur, nl S1/S2.      Pulses: Normal, <3 sec cap refill.   Pulmonary:      Clear left chest, diminished right  "chest  Abdominal:      Bowel sounds present, soft.   Musculoskeletal:         No gross abnormalities.  Skin:     Warm and dry, no rashes.   Neurological:      Alert, no focal deficits.            Last Recorded Vitals  Blood pressure (!) 91/49, pulse 94, temperature 36.1 °C (97 °F), temperature source Temporal, resp. rate 17, height 1.803 m (5' 11\"), weight 80.2 kg (176 lb 12.9 oz), SpO2 96%.    Relevant Results  CT angio chest for pulmonary embolism 11/07/2024     Narrative  Interpreted By:  Sushil Isabel,  STUDY:  CT ANGIO CHEST FOR PULMONARY EMBOLISM;  11/7/2024 6:16 pm     INDICATION:  Signs/Symptoms:elevated troponin, shortness of breath.        COMPARISON:  CTA chest for PE 10/07/2024     ACCESSION NUMBER(S):  [id number]     ORDERING CLINICIAN:  LAURA CHAVARRIA     TECHNIQUE:  Contiguous axial images of the chest were obtained after the  intravenous administration of 75 mL Omnipaque 350 contrast using  angiographic PE protocol. Coronal and sagittal reformatted images  were reconstructed from the axial data. MIP images were created on an  independent workstation and reviewed.     FINDINGS:  PULMONARY ARTERIES: Adequate opacification to the level of the  segmental arteries. Ill-defined areas of low-attenuation within the  distal right main pulmonary artery as well as throughout the proximal  segmental arteries of the right middle and lower lobes. While some of  this may be related to mixing artifact, the overall appearance is  concerning for pulmonary embolus. This is particularly true within  the distal right main pulmonary artery (series 5, image 135 segmental  arteries of the right middle lobe (series 5, image 150). The main  pulmonary artery is normal in diameter. Right ventricular to left  ventricular ratio of roughly 1.5 concerning for right heart strain.     HEART: Heart is enlarged. Triple-vessel coronary vascular  calcifications. Calcifications in the plane of the aortic and mitral  valves. No " significant pericardial effusion.     VESSELS: No aortic aneurysm. Assessment for aortic patency limited by  phase of contrast enhancement. Moderate calcifications of the  thoracic aorta.     MEDIASTINUM AND LYMPH NODES: Multiple nodules within the thyroid,  greatest on the right. This may be further assessed with outpatient  ultrasound. Prominent mediastinal lymph nodes including a 1.1 cm  right paratracheal node and 1.4 cm subcarinal node. No  pneumomediastinum. The esophagus appears within normal limits.     LUNG, AIRWAYS, AND PLEURA: The trachea and proximal mainstem bronchi  are patent. Extensive, progressive consolidations are noted  throughout the right lung, most pronounced within the perihilar  region, right middle lobe, and right lower lobe. There appears to be  narrowing and effacement of the right lower lobe bronchi. Multiple  air bronchograms are noted within the right lower lobe consolidation.  Loculated right lower lobe effusion measuring up to 7.9 cm with  surrounding wall thickening suggesting possible empyema.  Additionally, there is a 10.2 cm ovoid consolidative focus laterally  at the right lung base (series 5 image 193). Which appears to  demonstrate areas of complex fluid density. An additional loculated  effusion or possibly pulmonary abscess not excluded.     OSSEOUS STRUCTURES: No acute osseous abnormality. Median sternotomy  changes.     CHEST WALL SOFT TISSUES: No discernible abnormality.     UPPER ABDOMEN/OTHER: Please see separately dictated CT of the abdomen  and pelvis.     Impression  1. Low attenuating regions concerning for pulmonary emboli within the  distal right main pulmonary artery and segmental arteries of the  right middle and lower lobes. This may in part be related  to/accentuated by mixing artifact.  2. CT findings concerning for right heart strain. Consider  echocardiographic correlation.  3. Extensive, progressive consolidative opacities involving the right  perihilar  region, right middle lobe, and right lower lobe. While this  may reflect pneumonia, superimposed malignancy is not excluded.  Pulmonary consultation is recommended.  4. Small loculated pleural effusion dependently in the right lung  base suspicious for empyema.  5. An ovoid consolidative focus laterally in the right lower/middle  lobes appears to demonstrate areas of internal complex fluid density.  Considerations include loculated effusion, region of pneumonia or  atelectasis with possible pulmonary abscess/necrosis, or a necrotic  lesion.  6. Cardiomegaly.        Assessment/Plan     1- PE:    On IV heparin for potential bronchoscopy to rule out endobronchial lesion. He may be discharged on Eliquis loading dose and then 5 mg po BID to be used lifelong.    Does not need thrombophilia work up during acute event.    Underlying malignancy is a possibility. Tumor markers are normal. CT AP and bone scan are negative for malignancy. I will repeat CT chest in 4-6 weeks and follow him as an outpatient.    2- Low Albumin/Elevated ALP and indirect bilirubinemia: R ratio < 2 which is consistent with Cholestatic injury. PBC is on the differential On the other hand, Abd US does not show biliary dilatation. He was seen by Hepatology in 12/23 but never had a follow up. Recommend outpatient apt with Hepatology    I spent 60 minutes in the professional and overall care of this patient.      Elissa North MD

## 2024-11-09 NOTE — PROGRESS NOTES
Carl Rothman is a 59 y.o. male on day 2 of admission presenting with Acute pulmonary embolism with acute cor pulmonale (Multi).      Subjective   Patient seen and examined in bed. Wife at bedside.  Reports dark colored sputum with cough.  Reports having some increased anxiety symptoms.       Objective     Last Recorded Vitals  /56 (BP Location: Left arm, Patient Position: Lying)   Pulse 89   Temp 37.2 °C (99 °F) (Temporal)   Resp 16   Wt 77.6 kg (171 lb 1.2 oz)   SpO2 93%   Intake/Output last 3 Shifts:    Intake/Output Summary (Last 24 hours) at 11/9/2024 1455  Last data filed at 11/9/2024 1100  Gross per 24 hour   Intake 2359.15 ml   Output 700 ml   Net 1659.15 ml       Admission Weight  Weight: 79.4 kg (175 lb) (11/07/24 1551)    Daily Weight  11/09/24 : 77.6 kg (171 lb 1.2 oz)    Image Results  Transthoracic Echo (TTE) [place] [address]             Phone [phone]    TRANSTHORACIC ECHOCARDIOGRAM REPORT    Patient Name:       CARL ROTHMAN    Reading Physician:    [id number] Saucedo MD  Study Date:         11/8/2024            Ordering Provider:    [id number] SAUCEDO  MRN/PID:            [id number]             Fellow:  Accession#:         [id number]         Nurse:  Date of Birth/Age:  1965 / 59 years Sonographer:          Dina Gates RDCS  Gender Assigned at  M                    Additional Staff:  Birth:  Height:             182.88 cm            Admit Date:  Weight:             79.83 kg             Admission Status:     Inpatient -                                                                 Routine  BSA / BMI:          2.02 m2 / 23.87      Department Location:  [facility]                      kg/m2  Blood  Pressure: 90 /50 mmHg    Study Type:    TRANSTHORACIC ECHO (TTE) COMPLETE  Diagnosis/ICD: Other pulmonary embolism without acute cor pulmonale-I26.99  Indication:    PE  CPT Codes:     Echo Complete w Full [id number]   Study Detail: The following Echo studies were performed: 2D, M-Mode, Doppler and                color flow.       PHYSICIAN INTERPRETATION:  Left Ventricle: The left ventricular systolic function is low normal, with a visually estimated ejection fraction of 50-55%. There are no regional wall motion abnormalities. The left ventricular cavity size is normal. There is normal septal and normal posterior left ventricular wall thickness. There is left ventricular concentric remodeling. Abnormal (paradoxical) septal motion consistent with post-operative status and the interventricular septum is flattened in systole and diastole, consistent with right ventricular pressure and volume overload. Spectral Doppler shows a normal pattern of left ventricular diastolic filling.  Left Atrium: The left atrium is mildly dilated.  Right Ventricle: The right ventricle is moderately enlarged. There is normal right ventriclar wall thickness. There is reduced right ventricular systolic function.  Right Atrium: The right atrium is mildly dilated.  Aortic Valve: There is a prosthetic aortic valve present. The aortic valve dimensionless index is 0.43. There is trace prosthetic aortic valve regurgitation. There is trace aortic valve regurgitation. The peak instantaneous gradient of the aortic valve is 17 mmHg. The mean gradient of the aortic valve is 9 mmHg.  Mitral Valve: The mitral valve is normal in structure. There is normal mitral valve leaflet mobility. There is mild mitral annular calcification. The peak instantaneous gradient of the mitral valve is 11 mmHg. There is moderate mitral valve regurgitation which is centrally directed. The mitral regurgitant orifice area is 25 mm2.  Tricuspid Valve: The tricuspid valve is  structurally normal. There is normal tricuspid valve leaflet mobility. There is mild to moderate tricuspid regurgitation. The Doppler estimated RVSP is mildly elevated right ventricular systolic pressure at 41.5 mmHg.  Pulmonic Valve: The pulmonic valve is structurally normal. There is mild pulmonic valve regurgitation.  Pericardium: No pericardial effusion noted.  Aorta: The aortic root is normal. There is no dilatation of the aortic arch. There is no dilatation of the ascending aorta. There is no dilatation of the aortic root. There is plaque visualized in the ascending aorta, which is classified as a Grade 2 [mild (focal or diffuse) intimal thickening of 2-3 mm] atherosclerosis.  Pulmonary Artery: The pulmonary artery is normal in size. The tricuspid regurgitant velocity is 2.81 m/s, and with an estimated right atrial pressure of 10 mmHg, the estimated pulmonary artery pressure is mildly elevated with the RVSP at 41.5 mmHg. The estimated PASP is 41 mmHg.  Systemic Veins: The inferior vena cava appears moderately dilated, with IVC inspiratory collapse less than 50%.       CONCLUSIONS:   1. The left ventricular systolic function is low normal, with a visually estimated ejection fraction of 50-55%.   2. Abnormal septal motion consistent with post-operative status and right ventricular volume and pressure overload.   3. There is reduced right ventricular systolic function.   4. Moderately enlarged right ventricle.   5. Moderate mitral valve regurgitation.   6. Mild to moderate tricuspid regurgitation.   7. Mildly elevated right ventricular systolic pressure.   8. The estimated PASP is 41 mmHg.   9. The inferior vena cava appears moderately dilated, with IVC inspiratory collapse less than 50%.  10. The peak instantaneous gradient of the aortic valve is 17 mmHg.  11. There is plaque visualized in the ascending aorta.    QUANTITATIVE DATA SUMMARY:     2D MEASUREMENTS:           Normal Ranges:  IVSd:            0.84 cm    (0.6-1.1cm)  LVPWd:           0.86 cm   (0.6-1.1cm)  LVIDd:           3.92 cm   (3.9-5.9cm)  LVIDs:           3.36 cm  LV Mass Index:   48.9 g/m2  LV % FS          14.3 %       LA VOLUME:                    Normal Ranges:  LA Vol A4C:        100.1 ml   (22+/-6mL/m2)  LA Vol A2C:        88.8 ml  LA Vol BP:         97.5 ml  LA Vol Index A4C:  49.6 ml/m2  LA Vol Index A2C:  44.0 ml/m2  LA Vol Index BP:   48.3 ml/m2  LA Area A4C:       26.8 cm2  LA Area A2C:       24.4 cm2  LA Major Axis A4C: 6.1 cm  LA Major Axis A2C: 5.7 cm  LA Volume Index:   48.3 ml/m2  LA Vol A4C:        95.2 ml  LA Vol A2C:        84.9 ml  LA Vol Index BSA:  44.6 ml/m2       AORTA MEASUREMENTS:         Normal Ranges:  Asc Ao, d:          3.20 cm (2.1-3.4cm)       LV SYSTOLIC FUNCTION BY 2D PLANIMETRY (MOD):                       Normal Ranges:  EF-Visual:      53 %  LV EF Reported: 53 %       LV DIASTOLIC FUNCTION:           Normal Ranges:  MV Peak E:             1.51 m/s  (0.7-1.2 m/s)  MV Peak A:             0.42 m/s  (0.42-0.7 m/s)  E/A Ratio:             3.59      (1.0-2.2)  MV e'                  0.049 m/s (>8.0)  MV lateral e'          0.05 m/s  MV medial e'           0.05 m/s  E/e' Ratio:            30.78     (<8.0)       MITRAL VALVE:           Normal Ranges:  MV Vmax:      1.64 m/s  (<=1.3m/s)  MV peak PG:   10.8 mmHg (<5mmHg)  MV mean P.0 mmHg  (<48mmHg)  MV VTI:       33.09 cm  (10-13cm)  MV DT:        238 msec  (150-240msec)       MITRAL INSUFFICIENCY:             Normal Ranges:  PISA Radius:          0.7 cm  MR Vmax:              423.25 cm/s  MR Alias Raffi:         33.0 cm/s  MR Flow Rt:           104.54 ml/s  MR EROA:              25 mm2       AORTIC VALVE:                      Normal Ranges:  AoV Vmax:                2.09 m/s  (<=1.7m/s)  AoV Peak P.4 mmHg (<20mmHg)  AoV Mean P.9 mmHg  (1.7-11.5mmHg)  LVOT Max Raffi:            0.84 m/s  (<=1.1m/s)  AoV VTI:                 41.69 cm   (18-25cm)  LVOT VTI:                17.77 cm  AoV Dimensionless Index: 0.43       RIGHT VENTRICLE:  RV s' 0.04 m/s       TRICUSPID VALVE/RVSP:          Normal Ranges:  Peak TR Velocity:     2.81 m/s  RV Syst Pressure:     41 mmHg  (< 30mmHg)  IVC Diam:             2.83 cm       PULMONIC VALVE:         Normal Ranges:  PV Accel Time:  91 msec (>120ms)       AORTA:  Asc Ao Diam 3.25 cm       [id number] Devon Simmons MD  Electronically signed on 11/8/2024 at 6:31:16 PM       ** Final **  NM bone whole body  Narrative: Interpreted By:  Jerry Upton and Ogievich Taessa   STUDY:  NM BONE WHOLE BODY;  11/8/2024 1:57 pm      INDICATION:  Signs/Symptoms:rule out bone metastases.      Per EMR: Was admitted with fatigue, malaise, and productive cough. CT  with pneumonia and possible lung mass.      COMPARISON:  CTA chest 11/07/2020      ACCESSION NUMBER(S):  [id number]      ORDERING CLINICIAN:  ASPEN SEN      TECHNIQUE:  DIVISION OF NUCLEAR MEDICINE  BONE SCAN, WHOLE BODY plus REGIONAL VIEWS      The patient received an intravenous dose of  26.7 mCi of Tc-99m MDP.  Anterior and posterior images of the skeleton from skull vertex to  feet were then acquired.  Additional regional skeletal images were  also obtained.      FINDINGS:  No foci with decreased radiotracer uptake to suggest osseous  metastatic disease.      Mild increased radiotracer uptake in the right 8th posterior rib  which correlates with a healed fracture deformity on CT dated  11/07/2024.      Expected, excreted activity is noted in the kidneys and bladder.      Increased radiotracer uptake is seen in the bilateral shoulders,  knees, and feet, most consistent with an arthritic pattern.      Impression: No evidence of osseous metastatic disease.      I personally reviewed the images/study and I agree with the findings  as stated by Ruthann Wang DO, PGY-3. This study was interpreted  at [facility], [place], [place].       MACRO:  None      Signed by: Jerry Upton 11/8/2024 4:05 PM  Dictation workstation:   [id number]  ECG 12 lead  Normal sinus rhythm  Right ventricular hypertrophy  Nonspecific ST and T wave abnormality  Prolonged QT  Abnormal ECG  Confirmed by Andrea Hong ([id number]) on 11/8/2024 8:41:12 AM      Physical Exam  Constitutional:       Comments: Ill appearing   HENT:      Head: Normocephalic and atraumatic.   Eyes:      Extraocular Movements: Extraocular movements intact.      Pupils: Pupils are equal, round, and reactive to light.   Cardiovascular:      Rate and Rhythm: Normal rate and regular rhythm.   Pulmonary:      Breath sounds: No wheezing or rales.      Comments: Diminished BS throughout >> Right  Musculoskeletal:         General: Normal range of motion.      Cervical back: Normal range of motion and neck supple.      Right lower leg: No edema.      Left lower leg: No edema.   Skin:     General: Skin is warm and dry.   Neurological:      General: No focal deficit present.      Mental Status: He is alert and oriented to person, place, and time.   Psychiatric:         Mood and Affect: Mood normal.         Behavior: Behavior normal.       Relevant Results               Assessment/Plan    60yo Male, PMHx HTN, HLD, Pre-DM, Hypothyroidism, Depression, remote history of Hodgkin's lymphoma that was treated with mediastinal radiation and chemotherapy, history of coronary artery disease status post CABG x 4 in 2007, NSTEMI 6/26/20, who initially presented with complaints of progressive SOB, generalized weakness and persistent cough and unintentional weight loss.  Patient reports that his symptoms started exactly on October 7, 2024 with a sudden coughing spell, shortness of breath and expectoration of blood-tinged sputum.  Patient gives history of being treated with a course of doxycycline.  He reports initially feeling much improved after course of antibiotics.  However he says about 3 days later he began with similar  symptoms again.  At that time he was seen by his PCP who prescribed him a Z-Alejandro for 5 days.  He states that this did not do anything for him.  PCP ordered CXR and subsequently was sent to the ER for further workup.  ED workup revealed leukocytosis with white count of 17.2, hemoglobin was 13.1, and normal platelets of 344, however with neutrophilic predominance.  Blood chemistry revealed low sodium of 133 and an CHRIS with a creatinine of 1.41.  Patient denies any previous history of kidney dysfunction.  In addition, his BNP was elevated to 1095 and he had elevated cardiac enzymes of 182 and 151.  He had a CT angiogram which was positive for pulmonary emboli within the distal right main pulmonary artery and segmental arteries of the right middle and lower lobes.  CT also showed findings concerning for right heart strain and echocardiogram was recommended.  CT also noted small loculated pleural effusion dependently in the right lung base which was suspicious for empyema.  Also showed extensive progressive consolidative opacities involving the right perihilar region and middle right lower lobe and right lower lobe which was consistent with pneumonia however superimposed malignancy cannot be excluded.  Patient was admitted to the step down unit to the hospitalist service, started on a heparin drip.  Pulmonology, Oncology, Cardiology and Thoracic Surgery services consulted.        Assessment & Plan  Acute pulmonary embolism with acute cor pulmonale (Multi)  -Continue Heparin drip  -will transition to Eliquis on discharge  -Appreciate Pulmonary consult recommendations  -Planned for bronchoscopy on Monday 11/11    Pneumonia of right lower lobe due to infectious organism  -Multifocal Pneumonia  -Vanco discontinued  -Continue Zosyn  -bronchoscopy scheduled for Monday 11/11  -pulmonary consult recommendations appreciated    Lung mass  -repeat CT in 3 months    NSTEMI (non-ST elevated myocardial infarction) (Multi)  -no  complaint of chest pain  -continue heparin drip until bronchoscopy, will plan to convert to oral DOAC  -cardiology consult recommendations appreciated  -Hold Plavix,  continue aspirin, continue metoprolol and atorvastatin    Acute renal failure (ARF) (CMS-HCC)  -resolved with IV fluid hydration  -Hold home medication: metformin    Prediabetes  -Hold home dose of metformin  -SSI coverage  -check A1C  Aortic valve disorder  -has bioprosthetic valve  -Cardiology on board    Hodgkin lymphoma  -in remission  -s/p chemoradiation in the '80s    Empyema, right (Multi)  -Loculated effusion planned for IR drainage      Appreciate oncology consult recommendations:  -does not need thrombophilia work up during acute event  -underlying malignancy a possibility  -Tumor markers, CTAP, bone scan negative for malignancy  -Repeat CT chest in 4-6 weeks and follow up outpatient  -Recommend hepatology workup outpatient            Arlyn Gomes MD

## 2024-11-09 NOTE — CARE PLAN
Problem: Pain - Adult  Goal: Verbalizes/displays adequate comfort level or baseline comfort level  Outcome: Progressing     Problem: Safety - Adult  Goal: Free from fall injury  Outcome: Progressing     Problem: Discharge Planning  Goal: Discharge to home or other facility with appropriate resources  Outcome: Progressing     Problem: Chronic Conditions and Co-morbidities  Goal: Patient's chronic conditions and co-morbidity symptoms are monitored and maintained or improved  Outcome: Progressing     Problem: Nutrition  Goal: Oral intake greater 75%  Outcome: Progressing  Goal: Consume prescribed supplement  Outcome: Progressing   The patient's goals for the shift include      The clinical goals for the shift include remain hemodynamically stable    Over the shift, the patient did not make progress toward the following goals. Barriers to progression include . Recommendations to address these barriers include .

## 2024-11-09 NOTE — CARE PLAN
Problem: Pain - Adult  Goal: Verbalizes/displays adequate comfort level or baseline comfort level  Outcome: Progressing     Problem: Safety - Adult  Goal: Free from fall injury  Outcome: Progressing     Problem: Discharge Planning  Goal: Discharge to home or other facility with appropriate resources  Outcome: Progressing     Problem: Chronic Conditions and Co-morbidities  Goal: Patient's chronic conditions and co-morbidity symptoms are monitored and maintained or improved  Outcome: Progressing     Problem: Nutrition  Goal: Oral intake greater 75%  Outcome: Progressing  Goal: Consume prescribed supplement  Outcome: Progressing     Problem: Skin  Goal: Decreased wound size/increased tissue granulation at next dressing change  Outcome: Progressing  Goal: Participates in plan/prevention/treatment measures  Outcome: Progressing  Goal: Prevent/manage excess moisture  Outcome: Progressing  Goal: Prevent/minimize sheer/friction injuries  Outcome: Progressing  Goal: Promote/optimize nutrition  Outcome: Progressing  Goal: Promote skin healing  Outcome: Progressing   The patient's goals for the shift include rest     The clinical goals for the shift include stable Vitals

## 2024-11-09 NOTE — PROGRESS NOTES
Vancomycin Dosing by Pharmacy- Cessation of Therapy    Consult to pharmacy for vancomycin dosing has been discontinued by the prescriber, pharmacy will sign off at this time.    Please call pharmacy if there are further questions or re-enter a consult if vancomycin is resumed.     GIULIANA PAGAN, PharmD

## 2024-11-09 NOTE — ASSESSMENT & PLAN NOTE
-Continue Heparin drip  -will transition to Eliquis on discharge  -Appreciate Pulmonary consult recommendations  -Planned for bronchoscopy on Monday 11/11

## 2024-11-09 NOTE — PROGRESS NOTES
Subjective Data:      Overnight Events:         Objective Data:  Last Recorded Vitals:  Vitals:    11/08/24 1558 11/08/24 1727 11/09/24 0635 11/09/24 0737   BP: 93/60 (!) 91/49  97/55   BP Location: Left arm   Left arm   Patient Position: Lying   Lying   Pulse:       Resp: 17   17   Temp: 36.1 °C (97 °F)   37.1 °C (98.8 °F)   TempSrc: Temporal   Temporal   SpO2: 96%   96%   Weight:   77.6 kg (171 lb 1.2 oz)    Height:           Last Labs:  CBC - 11/9/2024:  5:09 AM  15.0 12.9 377    38.3      CMP - 11/9/2024:  5:09 AM  8.6 6.7 20 --- 2.4   3.1 2.8 12 221      PTT - 11/9/2024:  6:28 AM  1.3   13.9 67.1     TROPHS   Date/Time Value Ref Range Status   11/07/2024 05:51  0 - 20 ng/L Final     Comment:     Previous result verified on 11/7/2024 1735 on specimen/case [id number] called with component [facility] for procedure Troponin I, High Sensitivity, Initial with value 182 ng/L.   11/07/2024 04:44  0 - 20 ng/L Final   10/07/2024 11:16 PM 10 0 - 20 ng/L Final     BNP   Date/Time Value Ref Range Status   11/07/2024 04:44 PM 1,095 0 - 99 pg/mL Final   10/07/2024 10:02  0 - 99 pg/mL Final     HGBA1C   Date/Time Value Ref Range Status   07/16/2018 07:46 AM 7.0 4.0 - 6.0 % Final     Comment:     Hemoglobin A1C levels are related to mean blood glucose during the   preceding 2-3 months. The relationship table below may be used as a   general guide. Each 1% increase in HGB A1C is a reflection of an   increase in mean glucose of approximately 30 mg/dl.   Reference: Diabetes Care, volume 29, supplement 1 Jan. 2006                        HGB A1C ................. Approx. Mean Glucose   _______________________________________________   6%   ...............................  120 mg/dl   7%   ...............................  150 mg/dl   8%   ...............................  180 mg/dl   9%   ...............................  210 mg/dl   10%  ...............................  240 mg/dl  Performed at [facility] [id number]  [place] [address]     02/15/2018 11:37 AM 6.9 4.0 - 6.0 % Final     Comment:     Hemoglobin A1C levels are related to mean blood glucose during the   preceding 2-3 months. The relationship table below may be used as a   general guide. Each 1% increase in HGB A1C is a reflection of an   increase in mean glucose of approximately 30 mg/dl.   Reference: Diabetes Care, volume 29, supplement 1 Jan. 2006                        HGB A1C ................. Approx. Mean Glucose   _______________________________________________   6%   ...............................  120 mg/dl   7%   ...............................  150 mg/dl   8%   ...............................  180 mg/dl   9%   ...............................  210 mg/dl   10%  ...............................  240 mg/dl  Performed at [address] [place] [address]       LDLCALC   Date/Time Value Ref Range Status   08/02/2023 04:19 PM 94 65 - 130 MG/DL Final   08/24/2021 02:51 PM 88 65 - 130 MG/DL Final   06/27/2020 04:54 AM 79 65 - 130 MG/DL Final      Last I/O:  I/O last 3 completed shifts:  In: 3988.2 (51.4 mL/kg) [P.O.:760; I.V.:1479.2 (19.1 mL/kg); IV Piggyback:1749]  Out: 1140 (14.7 mL/kg) [Urine:1140 (0.4 mL/kg/hr)]  Weight: 77.6 kg     Past Cardiology Tests (Last 3 Years):  EKG:  ECG 12 lead 11/07/2024      ECG 12 lead 10/07/2024      ECG 12 lead (Clinic Performed) 01/23/2024    Echo:  Transthoracic Echo (TTE) Complete 11/08/2024    Ejection Fractions:  EF   Date/Time Value Ref Range Status   11/08/2024 03:40 PM 53 %      Cath:  No results found for this or any previous visit from the past 1095 days.    Stress Test:  No results found for this or any previous visit from the past 1095 days.    Cardiac Imaging:  No results found for this or any previous visit from the past 1095 days.      Inpatient Medications:  Scheduled medications   Medication Dose Route Frequency    aspirin  81 mg oral Every other day    atorvastatin  80 mg oral Daily     levothyroxine  25 mcg oral Daily before breakfast    metoprolol tartrate  25 mg oral BID    pantoprazole  40 mg oral Daily before breakfast    PARoxetine  10 mg oral Daily before breakfast    piperacillin-tazobactam  3.375 g intravenous q6h JENNIFER    potassium chloride  20 mEq intravenous Once     PRN medications   Medication    acetaminophen    albuterol    benzocaine-menthol    dextromethorphan-guaifenesin    guaiFENesin    heparin (porcine)    hydrOXYzine pamoate     Continuous Medications   Medication Dose Last Rate    heparin  0-4,500 Units/hr 1,200 Units/hr (11/09/24 0349)       Physical Exam:       Assessment/Plan     Peripheral IV 11/07/24 20 G Right Antecubital (Active)   Site Assessment Clean;Dry;Intact 11/08/24 2100   Dressing Status Clean;Dry;Occlusive 11/08/24 2100   Number of days: 2       Peripheral IV 11/07/24 20 G Right;Anterior Forearm (Active)   Site Assessment Clean;Dry;Intact 11/08/24 2100   Dressing Status Clean;Dry;Occlusive 11/08/24 2100   Number of days: 2       Code Status:  Full Code    I spent  minutes in the professional and overall care of this patient.        Devon Simmons MD

## 2024-11-09 NOTE — NURSING NOTE
Robitussin Syrup 5 ml. Oral given for cough, Awake, No other complaint. Still on Heparin drip. Will l continue to monitor. Call light in reach.

## 2024-11-09 NOTE — PROGRESS NOTES
"Carl Vazquez is a 59 y.o. male on day 2 of admission presenting with Acute pulmonary embolism with acute cor pulmonale (Multi).    Subjective   Still has hemoptysis, SOB with exertion on room air          Objective     Physical Exam  Constitutional:  Pleasant  Eyes: PERRL, EOMI,   ENMT: mucous membranes moist. Mild temporal Wasting   Head/Neck: Neck supple, No JVD,   Respiratory/Thorax: Diminished Breath sounds Right lung base. With crackles at the same area   Cardiovascular: Regular, rate and rhythm, no murmurs  Gastrointestinal: Soft, non-distended, +BS.  Musculoskeletal: ROM intact, no joint swelling, normal strength  Extremities: peripheral pulses intact; no edema  Neurological: Alert and Oriented x 3; no focal deficits; gross motor and sensation intact; CN II-XII intact. No asterixis.  Psychological: Appropriate mood and behavior  Skin: No lesions, No rashes.     Last Recorded Vitals  Blood pressure 107/56, pulse 94, temperature 37.2 °C (99 °F), temperature source Temporal, resp. rate 16, height 1.803 m (5' 11\"), weight 77.6 kg (171 lb 1.2 oz), SpO2 93%.  Intake/Output last 3 Shifts:  I/O last 3 completed shifts:  In: 3988.2 (51.4 mL/kg) [P.O.:760; I.V.:1479.2 (19.1 mL/kg); IV Piggyback:1749]  Out: 1140 (14.7 mL/kg) [Urine:1140 (0.4 mL/kg/hr)]  Weight: 77.6 kg     Relevant Results    Scheduled medications  aspirin, 81 mg, oral, Every other day  atorvastatin, 80 mg, oral, Daily  levothyroxine, 25 mcg, oral, Daily before breakfast  metoprolol tartrate, 25 mg, oral, BID  pantoprazole, 40 mg, oral, Daily before breakfast  PARoxetine, 10 mg, oral, Daily before breakfast  piperacillin-tazobactam, 3.375 g, intravenous, q6h JENNIFER  potassium chloride, 20 mEq, intravenous, Once      Continuous medications  heparin, 0-4,500 Units/hr, Last Rate: 1,200 Units/hr (11/09/24 0349)      PRN medications  PRN medications: acetaminophen, albuterol, benzocaine-menthol, dextromethorphan-guaifenesin, guaiFENesin, heparin (porcine), " hydrOXYzine pamoate  .rcn  Transthoracic Echo (TTE) Complete    Result Date: 11/8/2024           [place] [id number]            Phone [phone] TRANSTHORACIC ECHOCARDIOGRAM REPORT Patient Name:       VALERIE FERMINEUNICE Stanton Physician:    Marianne Saucedo MD Study Date:         11/8/2024            Ordering Provider:    [id number] SAUCEDO MRN/PID:            [id number]             Fellow: Accession#:         [id number]         Nurse: Date of Birth/Age:  1965 / 59 years Sonographer:          Dina Gates RDCS Gender Assigned at                      Additional Staff: Birth: Height:             182.88 cm            Admit Date: Weight:             79.83 kg             Admission Status:     Inpatient -                                                                Routine BSA / BMI:          2.02 m2 / 23.87      Department Location:  [facility]                     kg/m2 Blood Pressure: 90 /50 mmHg Study Type:    TRANSTHORACIC ECHO (TTE) COMPLETE Diagnosis/ICD: Other pulmonary embolism without acute cor pulmonale-I26.99 Indication:    PE CPT Codes:     Echo Complete w Full Doppler-[id number]  Study Detail: The following Echo studies were performed: 2D, M-Mode, Doppler and               color flow.  PHYSICIAN INTERPRETATION: Left Ventricle: The left ventricular systolic function is low normal, with a visually estimated ejection fraction of 50-55%. There are no regional wall motion abnormalities. The left ventricular cavity size is normal. There is normal septal and normal posterior left ventricular wall thickness. There is left ventricular concentric remodeling. Abnormal (paradoxical) septal motion consistent with post-operative status and the interventricular septum is  flattened in systole and diastole, consistent with right ventricular pressure and volume overload. Spectral Doppler shows a normal pattern of left ventricular diastolic filling. Left Atrium: The left atrium is mildly dilated. Right Ventricle: The right ventricle is moderately enlarged. There is normal right ventriclar wall thickness. There is reduced right ventricular systolic function. Right Atrium: The right atrium is mildly dilated. Aortic Valve: There is a prosthetic aortic valve present. The aortic valve dimensionless index is 0.43. There is trace prosthetic aortic valve regurgitation. There is trace aortic valve regurgitation. The peak instantaneous gradient of the aortic valve is 17 mmHg. The mean gradient of the aortic valve is 9 mmHg. Mitral Valve: The mitral valve is normal in structure. There is normal mitral valve leaflet mobility. There is mild mitral annular calcification. The peak instantaneous gradient of the mitral valve is 11 mmHg. There is moderate mitral valve regurgitation which is centrally directed. The mitral regurgitant orifice area is 25 mm2. Tricuspid Valve: The tricuspid valve is structurally normal. There is normal tricuspid valve leaflet mobility. There is mild to moderate tricuspid regurgitation. The Doppler estimated RVSP is mildly elevated right ventricular systolic pressure at 41.5 mmHg. Pulmonic Valve: The pulmonic valve is structurally normal. There is mild pulmonic valve regurgitation. Pericardium: No pericardial effusion noted. Aorta: The aortic root is normal. There is no dilatation of the aortic arch. There is no dilatation of the ascending aorta. There is no dilatation of the aortic root. There is plaque visualized in the ascending aorta, which is classified as a Grade 2 [mild (focal or diffuse) intimal thickening of 2-3 mm] atherosclerosis. Pulmonary Artery: The pulmonary artery is normal in size. The tricuspid regurgitant velocity is 2.81 m/s, and with an estimated right  atrial pressure of 10 mmHg, the estimated pulmonary artery pressure is mildly elevated with the RVSP at 41.5 mmHg. The estimated PASP is 41 mmHg. Systemic Veins: The inferior vena cava appears moderately dilated, with IVC inspiratory collapse less than 50%.  CONCLUSIONS:  1. The left ventricular systolic function is low normal, with a visually estimated ejection fraction of 50-55%.  2. Abnormal septal motion consistent with post-operative status and right ventricular volume and pressure overload.  3. There is reduced right ventricular systolic function.  4. Moderately enlarged right ventricle.  5. Moderate mitral valve regurgitation.  6. Mild to moderate tricuspid regurgitation.  7. Mildly elevated right ventricular systolic pressure.  8. The estimated PASP is 41 mmHg.  9. The inferior vena cava appears moderately dilated, with IVC inspiratory collapse less than 50%. 10. The peak instantaneous gradient of the aortic valve is 17 mmHg. 11. There is plaque visualized in the ascending aorta. QUANTITATIVE DATA SUMMARY:  2D MEASUREMENTS:           Normal Ranges: IVSd:            0.84 cm   (0.6-1.1cm) LVPWd:           0.86 cm   (0.6-1.1cm) LVIDd:           3.92 cm   (3.9-5.9cm) LVIDs:           3.36 cm LV Mass Index:   48.9 g/m2 LV % FS          14.3 %  LA VOLUME:                    Normal Ranges: LA Vol A4C:        100.1 ml   (22+/-6mL/m2) LA Vol A2C:        88.8 ml LA Vol BP:         97.5 ml LA Vol Index A4C:  49.6 ml/m2 LA Vol Index A2C:  44.0 ml/m2 LA Vol Index BP:   48.3 ml/m2 LA Area A4C:       26.8 cm2 LA Area A2C:       24.4 cm2 LA Major Axis A4C: 6.1 cm LA Major Axis A2C: 5.7 cm LA Volume Index:   48.3 ml/m2 LA Vol A4C:        95.2 ml LA Vol A2C:        84.9 ml LA Vol Index BSA:  44.6 ml/m2  AORTA MEASUREMENTS:         Normal Ranges: Asc Ao, d:          3.20 cm (2.1-3.4cm)  LV SYSTOLIC FUNCTION BY 2D PLANIMETRY (MOD):                      Normal Ranges: EF-Visual:      53 % LV EF Reported: 53 %  LV DIASTOLIC  FUNCTION:           Normal Ranges: MV Peak E:             1.51 m/s  (0.7-1.2 m/s) MV Peak A:             0.42 m/s  (0.42-0.7 m/s) E/A Ratio:             3.59      (1.0-2.2) MV e'                  0.049 m/s (>8.0) MV lateral e'          0.05 m/s MV medial e'           0.05 m/s E/e' Ratio:            30.78     (<8.0)  MITRAL VALVE:           Normal Ranges: MV Vmax:      1.64 m/s  (<=1.3m/s) MV peak PG:   10.8 mmHg (<5mmHg) MV mean P.0 mmHg  (<48mmHg) MV VTI:       33.09 cm  (10-13cm) MV DT:        238 msec  (150-240msec)  MITRAL INSUFFICIENCY:             Normal Ranges: PISA Radius:          0.7 cm MR Vmax:              423.25 cm/s MR Alias Raffi:         33.0 cm/s MR Flow Rt:           104.54 ml/s MR EROA:              25 mm2  AORTIC VALVE:                      Normal Ranges: AoV Vmax:                2.09 m/s  (<=1.7m/s) AoV Peak P.4 mmHg (<20mmHg) AoV Mean P.9 mmHg  (1.7-11.5mmHg) LVOT Max Raffi:            0.84 m/s  (<=1.1m/s) AoV VTI:                 41.69 cm  (18-25cm) LVOT VTI:                17.77 cm AoV Dimensionless Index: 0.43  RIGHT VENTRICLE: RV s' 0.04 m/s  TRICUSPID VALVE/RVSP:          Normal Ranges: Peak TR Velocity:     2.81 m/s RV Syst Pressure:     41 mmHg  (< 30mmHg) IVC Diam:             2.83 cm  PULMONIC VALVE:         Normal Ranges: PV Accel Time:  91 msec (>120ms)  AORTA: Asc Ao Diam 3.25 cm  [id number] Devon Simmons MD Electronically signed on 2024 at 6:31:16 PM  ** Final **     NM bone whole body    Result Date: 2024  Interpreted By:  Jerry Upton and Ogievich Taessa STUDY: NM BONE WHOLE BODY;  2024 1:57 pm   INDICATION: Signs/Symptoms:rule out bone metastases.   Per EMR: Was admitted with fatigue, malaise, and productive cough. CT with pneumonia and possible lung mass.   COMPARISON: CTA chest 2020   ACCESSION NUMBER(S): [id number]   ORDERING CLINICIAN: ASPEN SEN   TECHNIQUE: DIVISION OF NUCLEAR MEDICINE BONE SCAN, WHOLE BODY plus  REGIONAL VIEWS   The patient received an intravenous dose of  26.7 mCi of Tc-99m MDP. Anterior and posterior images of the skeleton from skull vertex to feet were then acquired.  Additional regional skeletal images were also obtained.   FINDINGS: No foci with decreased radiotracer uptake to suggest osseous metastatic disease.   Mild increased radiotracer uptake in the right 8th posterior rib which correlates with a healed fracture deformity on CT dated 11/07/2024.   Expected, excreted activity is noted in the kidneys and bladder.   Increased radiotracer uptake is seen in the bilateral shoulders, knees, and feet, most consistent with an arthritic pattern.       No evidence of osseous metastatic disease.   I personally reviewed the images/study and I agree with the findings as stated by Ruthann Wang DO, PGY-3. This study was interpreted at [place], [place].   MACRO: None   Signed by: Jerry Upton 11/8/2024 4:05 PM Dictation workstation:   [id number]    ECG 12 lead    Result Date: 11/8/2024  Normal sinus rhythm Right ventricular hypertrophy Nonspecific ST and T wave abnormality Prolonged QT Abnormal ECG Confirmed by Andrea Hong ([id number]) on 11/8/2024 8:41:12 AM    Lower extremity venous duplex bilateral    Result Date: 11/7/2024  Interpreted By:  Shelby Stein, STUDY: [facility] LOWER EXTREMITY VENOUS DUPLEX BILATERAL; 11/7/2024 10:18 pm   INDICATION: Signs/Symptoms:Swelling.   COMPARISON: None.   ACCESSION NUMBER(S): [id number]   ORDERING CLINICIAN: SARA GREEN   TECHNIQUE: 2D grayscale and color-flow duplex images were obtained along with Doppler spectral waveform analysis of the deep venous system of the both lower extremities from the groin to the knee. Attempts were made to image the calf veins. Venous compression and augmentation were performed.   FINDINGS: Right Lower Extremity: There is normal compressibility and flow within the visualized vessels of the deep  venous system of this lower extremity.   Left lower Extremity: There is normal compressibility and flow within the visualized vessels of the deep venous system of this lower extremity.         No sign of acute deep venous thrombosis in the lower extremities.     MACRO: none   Signed by: Shelby Stein 11/7/2024 10:56 PM Dictation workstation:   [id number]    US right upper quadrant    Result Date: 11/7/2024  Interpreted By:  Shelby Stein, STUDY: US RIGHT UPPER QUADRANT;  11/7/2024 9:20 pm   INDICATION: Signs/Symptoms:Abdominal pain.   COMPARISON: None.   ACCESSION NUMBER(S): [id number]   ORDERING CLINICIAN: SARA GREEN   TECHNIQUE: Grayscale and color Doppler imaging of the abdomen. Multiple images of the right upper quadrant were obtained.   FINDINGS: LIVER: The liver measures 19.0 cm. No intrahepatic biliary distention. No focal hepatic lesions.     GALLBLADDER: Gallbladder is partially collapsed and demonstrates wall thickening measuring 5 mm. This is in part due to incomplete distention of the gallbladder. There is no sign of gallstones or pericholecystic fluid. Sonographic Chen's sign is negative.     BILE DUCTS: No evidence of intra or extrahepatic biliary dilatation is identified; the common bile duct measures .   PANCREAS: Pancreas is completely obscured by bowel gas   RIGHT KIDNEY: The right kidney measures 14.3cm in length. In the mid right renal cortex there is a 3.5 cm simple cyst. The renal cortical echogenicity and thickness are within normal limit.  No hydronephrosis or renal calculi are seen.       1. There is gallbladder wall thickening that may be due to incomplete distention of the gallbladder 2. Mild hepatomegaly 3. No biliary distention   MACRO: None   Signed by: Shelby Stein 11/7/2024 10:38 PM Dictation workstation:   [id number]    CT abdomen pelvis w IV contrast    Result Date: 11/7/2024  Interpreted By:  Sushil Isabel, STUDY: CT ABDOMEN PELVIS W IV CONTRAST;  11/7/2024 6:16 pm    INDICATION: Signs/Symptoms:elevated bilirubin, shortness of breath.     COMPARISON: MRI liver 01/09/2024.   ACCESSION NUMBER(S): [id number]   ORDERING CLINICIAN: LAURA CHAVARRIA   TECHNIQUE: Contiguous axial images of the abdomen and pelvis were obtained after the intravenous administration of 75 mL Omnipaque 350 contrast. Coronal and sagittal reformatted images were reconstructed from the axial data.   FINDINGS: LOWER CHEST: Extensive consolidation within the visualized right lung base. Areas of fluid attenuation posterior inferiorly in the right lung base and swell as laterally suspicious for empyema. Additional considerations for the lateral focus would include pulmonary abscess/necrosis or necrotic lesion.   LIVER: Borderline hepatomegaly measuring 18.1 cm in length.   BILE DUCTS: No significant intrahepatic or extrahepatic dilatation.   GALLBLADDER: Mild gallbladder wall thickening in the setting of contraction. No radiopaque stone identified.   PANCREAS: No significant abnormality.   SPLEEN: Surgically absent.   ADRENALS: No significant abnormality.   KIDNEYS, URETERS, BLADDER: 3.7 cm simple fluid attenuating right renal cyst. No hydronephrosis or hydroureter. No significant bladder abnormality.   REPRODUCTIVE ORGANS: No significant abnormality.   GI: No obstruction. Mild wall thickening of the hepatic flexure and mild fluid and wall thickening adjacent to the splenic flexure. No obstruction. The appendix is not visualized.   VESSELS: No aortic aneurysm. Moderate to heavy aortic calcifications. The portal veins and IVC are patent.   PERITONEUM/RETROPERITONEUM: Trace perihepatic, right pericolic gutter, and pelvic ascites.   LYMPH NODES: No enlarged lymph nodes.   ABDOMINAL WALL: Anasarca.   OSSEOUS STRUCTURES: No acute osseous abnormality.       1. Borderline hepatomegaly and trace perihepatic ascites. Correlate with LFTs. 2. Mild gallbladder wall thickening in the setting of underdistention. Correlate  for clinical evidence of cholecystitis. Ultrasound may be performed in further assessment. 3. Mild wall thickening of the hepatic flexure and mild wall thickening and small amount of fluid of the splenic flexure. Findings may be related to underdistention or focal colitis. 4. Splenectomy. 5. Right basilar consolidations. Small effusion or empyema in the dependent right lung base. Ovoid consolidative focus partially visualized on this exam with internal peripherally enhancing fluid which could reflect an area of empyema, pulmonary abscess, or pulmonary necrosis/necrotic lesion.   MACRO: None   Signed by: Sushil Isabel 11/7/2024 6:55 PM Dictation workstation:   [id number]    CT angio chest for pulmonary embolism    Result Date: 11/7/2024  Interpreted By:  Sushil Isabel, STUDY: CT ANGIO CHEST FOR PULMONARY EMBOLISM;  11/7/2024 6:16 pm   INDICATION: Signs/Symptoms:elevated troponin, shortness of breath.     COMPARISON: CTA chest for PE 10/07/2024   ACCESSION NUMBER(S): [id number]   ORDERING CLINICIAN: LAURA CHAVARRIA   TECHNIQUE: Contiguous axial images of the chest were obtained after the intravenous administration of 75 mL Omnipaque 350 contrast using angiographic PE protocol. Coronal and sagittal reformatted images were reconstructed from the axial data. MIP images were created on an independent workstation and reviewed.   FINDINGS: PULMONARY ARTERIES: Adequate opacification to the level of the segmental arteries. Ill-defined areas of low-attenuation within the distal right main pulmonary artery as well as throughout the proximal segmental arteries of the right middle and lower lobes. While some of this may be related to mixing artifact, the overall appearance is concerning for pulmonary embolus. This is particularly true within the distal right main pulmonary artery (series 5, image 135 segmental arteries of the right middle lobe (series 5, image 150). The main pulmonary artery is normal in diameter.  Right ventricular to left ventricular ratio of roughly 1.5 concerning for right heart strain.   HEART: Heart is enlarged. Triple-vessel coronary vascular calcifications. Calcifications in the plane of the aortic and mitral valves. No significant pericardial effusion.   VESSELS: No aortic aneurysm. Assessment for aortic patency limited by phase of contrast enhancement. Moderate calcifications of the thoracic aorta.   MEDIASTINUM AND LYMPH NODES: Multiple nodules within the thyroid, greatest on the right. This may be further assessed with outpatient ultrasound. Prominent mediastinal lymph nodes including a 1.1 cm right paratracheal node and 1.4 cm subcarinal node. No pneumomediastinum. The esophagus appears within normal limits.   LUNG, AIRWAYS, AND PLEURA: The trachea and proximal mainstem bronchi are patent. Extensive, progressive consolidations are noted throughout the right lung, most pronounced within the perihilar region, right middle lobe, and right lower lobe. There appears to be narrowing and effacement of the right lower lobe bronchi. Multiple air bronchograms are noted within the right lower lobe consolidation. Loculated right lower lobe effusion measuring up to 7.9 cm with surrounding wall thickening suggesting possible empyema. Additionally, there is a 10.2 cm ovoid consolidative focus laterally at the right lung base (series 5 image 193). Which appears to demonstrate areas of complex fluid density. An additional loculated effusion or possibly pulmonary abscess not excluded.   OSSEOUS STRUCTURES: No acute osseous abnormality. Median sternotomy changes.   CHEST WALL SOFT TISSUES: No discernible abnormality.   UPPER ABDOMEN/OTHER: Please see separately dictated CT of the abdomen and pelvis.       1. Low attenuating regions concerning for pulmonary emboli within the distal right main pulmonary artery and segmental arteries of the right middle and lower lobes. This may in part be related to/accentuated by  mixing artifact. 2. CT findings concerning for right heart strain. Consider echocardiographic correlation. 3. Extensive, progressive consolidative opacities involving the right perihilar region, right middle lobe, and right lower lobe. While this may reflect pneumonia, superimposed malignancy is not excluded. Pulmonary consultation is recommended. 4. Small loculated pleural effusion dependently in the right lung base suspicious for empyema. 5. An ovoid consolidative focus laterally in the right lower/middle lobes appears to demonstrate areas of internal complex fluid density. Considerations include loculated effusion, region of pneumonia or atelectasis with possible pulmonary abscess/necrosis, or a necrotic lesion. 6. Cardiomegaly.   MACRO: Sushil Isabel discussed the significance and urgency of this critical finding by telephone with  LAURA CHAVARRIA on 11/7/2024 at 6:32 pm.  (**-RCF-**) Findings:  See findings.   Signed by: Sushil Isabel 11/7/2024 6:37 PM Dictation workstation:   [id number]    XR chest 2 views    Result Date: 11/7/2024  Interpreted By:  Adela Ferreira, STUDY: XR CHEST 2 VIEWS;  11/7/2024 4:17 pm   INDICATION: Signs/Symptoms:sob.     COMPARISON: 11/04/2024   ACCESSION NUMBER(S): [id number]   ORDERING CLINICIAN: VICTORINA VALADEZ   FINDINGS:         CARDIOMEDIASTINAL SILHOUETTE: Sternotomy wires are unchanged, including fracture of the superior most wire. The right cardiac border is obscured. Left cardiac border is unchanged with no evidence of cardiomegaly. Aorta is atherosclerotic.   LUNGS: There is a additional volume loss in the right lower lobe with air bronchograms noted. There is also volume loss in the right middle lobe. Right pleural effusion appears similar to the prior study. Left lung is clear.   ABDOMEN: No remarkable upper abdominal findings.   BONES: No acute osseous changes.       1.  Unchanged small right pleural effusion but progression of volume loss right middle and lower  lobes.       MACRO: None   Signed by: Adela Ferreira 11/7/2024 4:40 PM Dictation workstation:   [id number]    XR chest 2 views    Result Date: 11/7/2024  Interpreted By:  Lisa Mandel, STUDY: XR CHEST 2 VIEWS;  11/4/2024 4:48 pm   INDICATION: Signs/Symptoms:cough, pneumonia.   ,J18.9 Pneumonia, unspecified organism   COMPARISON: 10/07/2024   ACCESSION NUMBER(S): [id number]   ORDERING CLINICIAN: DAMARIS DIAS   FINDINGS: Right perihilar infiltrate again seen. Pleural density laterally slightly increased since the previous exam. The left lung is clear. Cardiac silhouette is likely mildly enlarged status post sternotomy. Stable discontinuity of the superior most sternal wire. Left upper quadrant surgical clips again seen.       Persistent right perihilar infiltrate with increased pleural effusion since 10/07/2024. Follow-up to ensure resolution after appropriate treatment recommended.   MACRO: None.   Signed by: Lisa Mandel 11/7/2024 12:12 PM Dictation workstation:   [id number]                        Assessment/Plan   Assessment & Plan  Acute pulmonary embolism with acute cor pulmonale (Multi)  On heparin gtt needs to switch to Eliquis upon discharge   Lung mass  Need follow up CT chest in 3 months   Pulmonary hypertension (Multi)  Likely type 2&3 follow up echo in 6 months  Empyema, right (Multi)  Plan to drain loculated effusion by IR   Pneumonia of right lower lobe due to infectious organism  On zosyn   MRSA screen is negative   Plan for bronchoscopy on Monday   Pleural effusion on right    Pulmonary abscess (Multi)  On zosyn cultures are negative to date         I spent 30 minutes in the professional and overall care of this patient.      Tarek R Gharibeh, MD

## 2024-11-09 NOTE — NURSING NOTE
Received patient at edge of bed conversant to wife. Report done at bedside. Heparin drip @ 12 ml/hr. Going on and IV Fluid Normal Saline @ 75 ml/hr. Going on. Assessed, Complained of cough and slight anxious, Robitussin Syrup 5 ml. Oral and Vistaril 25 mg po given.Will monitor.

## 2024-11-09 NOTE — ASSESSMENT & PLAN NOTE
-Multifocal Pneumonia  -Vanco discontinued  -Continue Zosyn  -bronchoscopy scheduled for Monday 11/11  -pulmonary consult recommendations appreciated

## 2024-11-10 ENCOUNTER — APPOINTMENT (OUTPATIENT)
Dept: RADIOLOGY | Facility: HOSPITAL | Age: 59
DRG: 003 | End: 2024-11-10
Payer: MEDICARE

## 2024-11-10 ENCOUNTER — APPOINTMENT (OUTPATIENT)
Dept: CARDIOLOGY | Facility: HOSPITAL | Age: 59
DRG: 003 | End: 2024-11-10
Payer: MEDICARE

## 2024-11-10 ENCOUNTER — APPOINTMENT (OUTPATIENT)
Dept: CARDIOLOGY | Facility: HOSPITAL | Age: 59
End: 2024-11-10
Payer: MEDICARE

## 2024-11-10 ENCOUNTER — HOSPITAL ENCOUNTER (INPATIENT)
Facility: HOSPITAL | Age: 59
LOS: 2 days | DRG: 003 | End: 2024-11-12
Attending: THORACIC SURGERY (CARDIOTHORACIC VASCULAR SURGERY) | Admitting: THORACIC SURGERY (CARDIOTHORACIC VASCULAR SURGERY)
Payer: MEDICARE

## 2024-11-10 ENCOUNTER — PREP FOR PROCEDURE (OUTPATIENT)
Dept: CARDIOLOGY | Facility: HOSPITAL | Age: 59
End: 2024-11-10

## 2024-11-10 VITALS
RESPIRATION RATE: 22 BRPM | TEMPERATURE: 98.4 F | BODY MASS INDEX: 22.35 KG/M2 | OXYGEN SATURATION: 95 % | DIASTOLIC BLOOD PRESSURE: 21 MMHG | SYSTOLIC BLOOD PRESSURE: 73 MMHG | WEIGHT: 159.61 LBS | HEART RATE: 129 BPM | HEIGHT: 71 IN

## 2024-11-10 DIAGNOSIS — R57.0 CARDIOGENIC SHOCK (MULTI): Primary | ICD-10-CM

## 2024-11-10 DIAGNOSIS — I21.4 NSTEMI (NON-ST ELEVATED MYOCARDIAL INFARCTION) (MULTI): ICD-10-CM

## 2024-11-10 LAB
ABO GROUP (TYPE) IN BLOOD: NORMAL
ABO GROUP (TYPE) IN BLOOD: NORMAL
ACANTHOCYTES BLD QL SMEAR: ABNORMAL
ACANTHOCYTES BLD QL SMEAR: ABNORMAL
ALBUMIN SERPL BCP-MCNC: 1.9 G/DL (ref 3.4–5)
ALBUMIN SERPL BCP-MCNC: 2.5 G/DL (ref 3.4–5)
ALBUMIN SERPL BCP-MCNC: 2.9 G/DL (ref 3.4–5)
ALP SERPL-CCNC: 169 U/L (ref 33–120)
ALP SERPL-CCNC: 200 U/L (ref 33–120)
ALT SERPL W P-5'-P-CCNC: 377 U/L (ref 10–52)
ALT SERPL W P-5'-P-CCNC: 89 U/L (ref 10–52)
ANION GAP BLDA CALCULATED.4IONS-SCNC: 17 MMO/L (ref 10–25)
ANION GAP BLDA CALCULATED.4IONS-SCNC: 21 MMO/L (ref 10–25)
ANION GAP BLDA CALCULATED.4IONS-SCNC: 25 MMO/L (ref 10–25)
ANION GAP BLDA CALCULATED.4IONS-SCNC: 28 MMO/L (ref 10–25)
ANION GAP BLDA CALCULATED.4IONS-SCNC: 29 MMO/L (ref 10–25)
ANION GAP BLDA CALCULATED.4IONS-SCNC: 30 MMO/L (ref 10–25)
ANION GAP BLDA CALCULATED.4IONS-SCNC: 32 MMO/L
ANION GAP BLDA CALCULATED.4IONS-SCNC: 32 MMO/L (ref 10–25)
ANION GAP BLDA CALCULATED.4IONS-SCNC: 35 MMO/L (ref 10–25)
ANION GAP BLDV CALCULATED.4IONS-SCNC: 21 MMOL/L (ref 10–25)
ANION GAP BLDV CALCULATED.4IONS-SCNC: 22 MMOL/L (ref 10–25)
ANION GAP BLDV CALCULATED.4IONS-SCNC: 23 MMOL/L (ref 10–25)
ANION GAP BLDV CALCULATED.4IONS-SCNC: 35 MMO/L
ANION GAP SERPL CALCULATED.3IONS-SCNC: 23 MMOL/L (ref 10–20)
ANION GAP SERPL CALCULATED.3IONS-SCNC: 33 MMOL/L (ref 10–20)
ANION GAP SERPL CALCULATED.3IONS-SCNC: 35 MMOL/L (ref 10–20)
ANTIBODY SCREEN: NORMAL
APPARATUS: ABNORMAL
APTT PPP: 113 SECONDS (ref 22–32.5)
APTT PPP: 134.1 SECONDS (ref 22–32.5)
APTT PPP: 40.7 SECONDS (ref 22–32.5)
APTT PPP: 40.9 SECONDS (ref 22–32.5)
APTT PPP: 73.2 SECONDS (ref 22–32.5)
ARTERIAL PATENCY WRIST A: NEGATIVE
ARTERIAL PATENCY WRIST A: POSITIVE
AST SERPL W P-5'-P-CCNC: 1144 U/L (ref 9–39)
AST SERPL W P-5'-P-CCNC: 263 U/L (ref 9–39)
BACTERIA BLD CULT: NORMAL
BACTERIA BLD CULT: NORMAL
BACTERIA SPEC RESP CULT: ABNORMAL
BASE EXCESS BLDA CALC-SCNC: -10.6 MMOL/L (ref -2–3)
BASE EXCESS BLDA CALC-SCNC: -10.8 MMOL/L (ref -2–3)
BASE EXCESS BLDA CALC-SCNC: -13.7 MMOL/L (ref -2–3)
BASE EXCESS BLDA CALC-SCNC: -14.4 MMOL/L (ref -2–3)
BASE EXCESS BLDA CALC-SCNC: -15 MMOL/L
BASE EXCESS BLDA CALC-SCNC: -15.4 MMOL/L (ref -2–3)
BASE EXCESS BLDA CALC-SCNC: -15.7 MMOL/L (ref -2–3)
BASE EXCESS BLDA CALC-SCNC: -16.4 MMOL/L (ref -2–3)
BASE EXCESS BLDA CALC-SCNC: -9.4 MMOL/L (ref -2–3)
BASE EXCESS BLDV CALC-SCNC: -10.1 MMOL/L (ref -2–3)
BASE EXCESS BLDV CALC-SCNC: -12 MMOL/L (ref -2–3)
BASE EXCESS BLDV CALC-SCNC: -14.9 MMOL/L
BASE EXCESS BLDV CALC-SCNC: -15 MMOL/L (ref -2–3)
BASOPHILS # BLD AUTO: 0.2 X10*3/UL (ref 0–0.1)
BASOPHILS # BLD MANUAL: 0 X10*3/UL (ref 0–0.1)
BASOPHILS # BLD MANUAL: 0.44 X10*3/UL (ref 0–0.1)
BASOPHILS NFR BLD AUTO: 0.6 %
BASOPHILS NFR BLD MANUAL: 0 %
BASOPHILS NFR BLD MANUAL: 1 %
BILIRUB SERPL-MCNC: 2.4 MG/DL (ref 0–1.2)
BILIRUB SERPL-MCNC: 2.8 MG/DL (ref 0–1.2)
BNP SERPL-MCNC: 2062 PG/ML (ref 0–99)
BODY TEMPERATURE: 37 DEGREES CELSIUS
BODY TEMPERATURE: ABNORMAL
BUN SERPL-MCNC: 18 MG/DL (ref 6–23)
BUN SERPL-MCNC: 20 MG/DL (ref 6–23)
BUN SERPL-MCNC: 22 MG/DL (ref 6–23)
BURR CELLS BLD QL SMEAR: ABNORMAL
BURR CELLS BLD QL SMEAR: ABNORMAL
CA-I BLD-SCNC: 0.96 MMOL/L (ref 1.1–1.33)
CA-I BLD-SCNC: 1.01 MMOL/L (ref 1.1–1.33)
CA-I BLDA-SCNC: 0.99 MMOL/L (ref 1.1–1.33)
CA-I BLDA-SCNC: 1.02 MMOL/L (ref 1.1–1.33)
CA-I BLDA-SCNC: 1.03 MMOL/L (ref 1.1–1.33)
CA-I BLDA-SCNC: 1.05 MMOL/L (ref 1.1–1.33)
CA-I BLDA-SCNC: 1.1 MMOL/L (ref 1.1–1.33)
CA-I BLDA-SCNC: 1.12 MMOL/L (ref 1.1–1.33)
CA-I BLDA-SCNC: 1.13 MMOL/L (ref 1.1–1.33)
CA-I BLDA-SCNC: 1.13 MMOL/L (ref 1.1–1.33)
CA-I BLDA-SCNC: 1.2 MMOL/L (ref 1.1–1.33)
CA-I BLDV-SCNC: 1 MMOL/L (ref 1.1–1.33)
CA-I BLDV-SCNC: 1 MMOL/L (ref 1.1–1.33)
CA-I BLDV-SCNC: 1.05 MMOL/L (ref 1.1–1.33)
CA-I BLDV-SCNC: 1.08 MMOL/L (ref 1.1–1.33)
CALCIUM SERPL-MCNC: 8.2 MG/DL (ref 8.6–10.3)
CALCIUM SERPL-MCNC: 8.6 MG/DL (ref 8.6–10.3)
CALCIUM SERPL-MCNC: 8.6 MG/DL (ref 8.6–10.3)
CARDIAC TROPONIN I PNL SERPL HS: 3154 NG/L (ref 0–20)
CARDIAC TROPONIN I PNL SERPL HS: 983 NG/L (ref 0–20)
CHLORIDE BLDA-SCNC: 100 MMOL/L (ref 98–107)
CHLORIDE BLDA-SCNC: 100 MMOL/L (ref 98–107)
CHLORIDE BLDA-SCNC: 93 MMOL/L (ref 98–107)
CHLORIDE BLDA-SCNC: 94 MMOL/L (ref 98–107)
CHLORIDE BLDA-SCNC: 95 MMOL/L (ref 98–107)
CHLORIDE BLDA-SCNC: 95 MMOL/L (ref 98–107)
CHLORIDE BLDV-SCNC: 91 MMOL/L (ref 98–107)
CHLORIDE BLDV-SCNC: 97 MMOL/L (ref 98–107)
CHLORIDE BLDV-SCNC: 98 MMOL/L (ref 98–107)
CHLORIDE BLDV-SCNC: 98 MMOL/L (ref 98–107)
CHLORIDE SERPL-SCNC: 93 MMOL/L (ref 98–107)
CHLORIDE SERPL-SCNC: 94 MMOL/L (ref 98–107)
CHLORIDE SERPL-SCNC: 98 MMOL/L (ref 98–107)
CO2 SERPL-SCNC: 15 MMOL/L (ref 21–32)
CO2 SERPL-SCNC: 18 MMOL/L (ref 21–32)
CO2 SERPL-SCNC: 21 MMOL/L (ref 21–32)
CREAT SERPL-MCNC: 1.44 MG/DL (ref 0.5–1.3)
CREAT SERPL-MCNC: 1.87 MG/DL (ref 0.5–1.3)
CREAT SERPL-MCNC: 1.89 MG/DL (ref 0.5–1.3)
CRITICAL CALL TIME: 1255
CRITICAL CALL TIME: 1417
CRITICAL CALL TIME: 1902
CRITICAL CALL TIME: 1942
CRITICAL CALL TIME: 2022
CRITICAL CALL TIME: 2129
CRITICAL CALL TIME: 2202
CRITICAL CALLED BY: ABNORMAL
CRITICAL CALLED TO: ABNORMAL
CRITICAL NOTE: ABNORMAL
CRITICAL READ BACK: ABNORMAL
EGFRCR SERPLBLD CKD-EPI 2021: 40 ML/MIN/1.73M*2
EGFRCR SERPLBLD CKD-EPI 2021: 41 ML/MIN/1.73M*2
EGFRCR SERPLBLD CKD-EPI 2021: 56 ML/MIN/1.73M*2
EJECTION FRACTION APICAL 4 CHAMBER: 14.7
EJECTION FRACTION: 23 %
EOSINOPHIL # BLD AUTO: 0.04 X10*3/UL (ref 0–0.7)
EOSINOPHIL # BLD MANUAL: 0 X10*3/UL (ref 0–0.7)
EOSINOPHIL # BLD MANUAL: 0 X10*3/UL (ref 0–0.7)
EOSINOPHIL NFR BLD AUTO: 0.1 %
EOSINOPHIL NFR BLD MANUAL: 0 %
EOSINOPHIL NFR BLD MANUAL: 0 %
EPAP CMH2O: 5 CM H2O
ERYTHROCYTE [DISTWIDTH] IN BLOOD BY AUTOMATED COUNT: 15.4 % (ref 11.5–14.5)
ERYTHROCYTE [DISTWIDTH] IN BLOOD BY AUTOMATED COUNT: 15.8 % (ref 11.5–14.5)
ERYTHROCYTE [DISTWIDTH] IN BLOOD BY AUTOMATED COUNT: 16.3 % (ref 11.5–14.5)
EST. AVERAGE GLUCOSE BLD GHB EST-MCNC: 146 MG/DL
FIBRINOGEN PPP-MCNC: 316 MG/DL (ref 200–400)
FIBRINOGEN PPP-MCNC: 459 MG/DL (ref 200–400)
FLOW: 15 LPM
FLUAV RNA RESP QL NAA+PROBE: NOT DETECTED
FLUBV RNA RESP QL NAA+PROBE: NOT DETECTED
GLUCOSE BLD MANUAL STRIP-MCNC: 115 MG/DL (ref 74–99)
GLUCOSE BLD MANUAL STRIP-MCNC: 71 MG/DL (ref 74–99)
GLUCOSE BLDA-MCNC: 114 MG/DL (ref 74–99)
GLUCOSE BLDA-MCNC: 156 MG/DL (ref 74–99)
GLUCOSE BLDA-MCNC: 180 MG/DL (ref 74–99)
GLUCOSE BLDA-MCNC: 181 MG/DL (ref 74–99)
GLUCOSE BLDA-MCNC: 189 MG/DL (ref 74–99)
GLUCOSE BLDA-MCNC: 195 MG/DL (ref 74–99)
GLUCOSE BLDA-MCNC: 200 MG/DL (ref 74–99)
GLUCOSE BLDA-MCNC: 231 MG/DL (ref 74–99)
GLUCOSE BLDA-MCNC: 92 MG/DL (ref 74–99)
GLUCOSE BLDV-MCNC: 102 MG/DL (ref 74–99)
GLUCOSE BLDV-MCNC: 102 MG/DL (ref 74–99)
GLUCOSE BLDV-MCNC: 204 MG/DL (ref 74–99)
GLUCOSE BLDV-MCNC: 68 MG/DL (ref 74–99)
GLUCOSE SERPL-MCNC: 199 MG/DL (ref 74–99)
GLUCOSE SERPL-MCNC: 208 MG/DL (ref 74–99)
GLUCOSE SERPL-MCNC: 96 MG/DL (ref 74–99)
GRAM STN SPEC: ABNORMAL
GRAM STN SPEC: ABNORMAL
HBA1C MFR BLD: 6.7 %
HCO3 BLDA-SCNC: 10.3 MMOL/L
HCO3 BLDA-SCNC: 10.3 MMOL/L (ref 22–26)
HCO3 BLDA-SCNC: 11.7 MMOL/L (ref 22–26)
HCO3 BLDA-SCNC: 13.9 MMOL/L (ref 22–26)
HCO3 BLDA-SCNC: 14 MMOL/L (ref 22–26)
HCO3 BLDA-SCNC: 14.1 MMOL/L (ref 22–26)
HCO3 BLDA-SCNC: 15 MMOL/L (ref 22–26)
HCO3 BLDA-SCNC: 15.2 MMOL/L (ref 22–26)
HCO3 BLDA-SCNC: 15.8 MMOL/L (ref 22–26)
HCO3 BLDV-SCNC: 10.9 MMOL/L
HCO3 BLDV-SCNC: 17.9 MMOL/L (ref 22–26)
HCO3 BLDV-SCNC: 18.5 MMOL/L (ref 22–26)
HCO3 BLDV-SCNC: 21.1 MMOL/L (ref 22–26)
HCT VFR BLD AUTO: 31.5 % (ref 41–52)
HCT VFR BLD AUTO: 38.4 % (ref 41–52)
HCT VFR BLD AUTO: 43.1 % (ref 41–52)
HCT VFR BLD EST: 34 % (ref 41–52)
HCT VFR BLD EST: 34 % (ref 41–52)
HCT VFR BLD EST: 37 % (ref 41–52)
HCT VFR BLD EST: 37 % (ref 41–52)
HCT VFR BLD EST: 41 % (ref 41–52)
HCT VFR BLD EST: 42 % (ref 41–52)
HCT VFR BLD EST: 42 % (ref 41–52)
HCT VFR BLD EST: 43 % (ref 41–52)
HCT VFR BLD EST: 44 % (ref 41–52)
HGB BLD-MCNC: 10.9 G/DL (ref 13.5–17.5)
HGB BLD-MCNC: 12.9 G/DL (ref 13.5–17.5)
HGB BLD-MCNC: 13.7 G/DL (ref 13.5–17.5)
HGB BLDA-MCNC: 11.2 G/DL (ref 13.5–17.5)
HGB BLDA-MCNC: 11.3 G/DL (ref 13.5–17.5)
HGB BLDA-MCNC: 12.2 G/DL (ref 13.5–17.5)
HGB BLDA-MCNC: 12.2 G/DL (ref 13.5–17.5)
HGB BLDA-MCNC: 13.7 G/DL (ref 13.5–17.5)
HGB BLDA-MCNC: 13.9 G/DL (ref 13.5–17.5)
HGB BLDA-MCNC: 14.3 G/DL (ref 13.5–17.5)
HGB BLDA-MCNC: 14.4 G/DL (ref 13.5–17.5)
HGB BLDA-MCNC: 14.7 G/DL (ref 13.5–17.5)
HGB BLDV-MCNC: 13.9 G/DL (ref 13.5–17.5)
HGB BLDV-MCNC: 14.3 G/DL (ref 13.5–17.5)
HGB BLDV-MCNC: 14.4 G/DL (ref 13.5–17.5)
HGB BLDV-MCNC: 14.4 G/DL (ref 13.5–17.5)
HOLD SPECIMEN: NORMAL
HOLD SPECIMEN: NORMAL
IMM GRANULOCYTES # BLD AUTO: 1.33 X10*3/UL (ref 0–0.7)
IMM GRANULOCYTES # BLD AUTO: 2.53 X10*3/UL (ref 0–0.7)
IMM GRANULOCYTES # BLD AUTO: 2.63 X10*3/UL (ref 0–0.7)
IMM GRANULOCYTES NFR BLD AUTO: 4.3 % (ref 0–0.9)
IMM GRANULOCYTES NFR BLD AUTO: 5.7 % (ref 0–0.9)
IMM GRANULOCYTES NFR BLD AUTO: 6.9 % (ref 0–0.9)
INHALED O2 CONCENTRATION: 100 %
INR PPP: 1.3 (ref 0.9–1.2)
INR PPP: 1.8 (ref 0.9–1.2)
INR PPP: 2 (ref 0.9–1.2)
IPAP CMH2O: 15 CM H2O
LACTATE BLDA-SCNC: 16.7 MMOL/L (ref 0.4–2)
LACTATE BLDA-SCNC: 7.9 MMOL/L (ref 0.4–2)
LACTATE BLDA-SCNC: 9.5 MMOL/L (ref 0.4–2)
LACTATE BLDA-SCNC: >17 MMOL/L (ref 0.4–2)
LACTATE BLDV-SCNC: 11.3 MMOL/L (ref 0.4–2)
LACTATE BLDV-SCNC: 16.6 MMOL/L (ref 0.4–2)
LACTATE BLDV-SCNC: 16.6 MMOL/L (ref 0.4–2)
LACTATE BLDV-SCNC: 16.8 MMOL/L (ref 0.4–2)
LACTATE BLDV-SCNC: 16.8 MMOL/L (ref 0.4–2)
LACTATE BLDV-SCNC: 9.8 MMOL/L (ref 0.4–2)
LACTATE BLDV-SCNC: >17 MMOL/L (ref 0.4–2)
LACTATE BLDV-SCNC: >17 MMOL/L (ref 0.4–2)
LEFT VENTRICLE INTERNAL DIMENSION DIASTOLE: 4.41 CM (ref 3.5–6)
LYMPHOCYTES # BLD AUTO: 2.24 X10*3/UL (ref 1.2–4.8)
LYMPHOCYTES # BLD MANUAL: 0 X10*3/UL (ref 1.2–4.8)
LYMPHOCYTES # BLD MANUAL: 1.53 X10*3/UL (ref 1.2–4.8)
LYMPHOCYTES NFR BLD AUTO: 7.2 %
LYMPHOCYTES NFR BLD MANUAL: 0 %
LYMPHOCYTES NFR BLD MANUAL: 4 %
MAGNESIUM SERPL-MCNC: 2.11 MG/DL (ref 1.6–2.4)
MAGNESIUM SERPL-MCNC: 2.52 MG/DL (ref 1.6–2.4)
MAGNESIUM SERPL-MCNC: 3 MG/DL (ref 1.6–2.4)
MCH RBC QN AUTO: 32.4 PG (ref 26–34)
MCH RBC QN AUTO: 32.5 PG (ref 26–34)
MCH RBC QN AUTO: 32.8 PG (ref 26–34)
MCHC RBC AUTO-ENTMCNC: 31.8 G/DL (ref 32–36)
MCHC RBC AUTO-ENTMCNC: 33.6 G/DL (ref 32–36)
MCHC RBC AUTO-ENTMCNC: 34.6 G/DL (ref 32–36)
MCV RBC AUTO: 102 FL (ref 80–100)
MCV RBC AUTO: 95 FL (ref 80–100)
MCV RBC AUTO: 97 FL (ref 80–100)
METAMYELOCYTES # BLD MANUAL: 0.89 X10*3/UL
METAMYELOCYTES # BLD MANUAL: 1.15 X10*3/UL
METAMYELOCYTES NFR BLD MANUAL: 2 %
METAMYELOCYTES NFR BLD MANUAL: 3 %
MONOCYTES # BLD AUTO: 1.51 X10*3/UL (ref 0.1–1)
MONOCYTES # BLD MANUAL: 1.33 X10*3/UL (ref 0.1–1)
MONOCYTES # BLD MANUAL: 1.53 X10*3/UL (ref 0.1–1)
MONOCYTES NFR BLD AUTO: 4.8 %
MONOCYTES NFR BLD MANUAL: 3 %
MONOCYTES NFR BLD MANUAL: 4 %
NEUTROPHILS # BLD AUTO: 25.94 X10*3/UL (ref 1.2–7.7)
NEUTROPHILS # BLD MANUAL: 34.09 X10*3/UL (ref 1.2–7.7)
NEUTROPHILS # BLD MANUAL: 41.65 X10*3/UL (ref 1.2–7.7)
NEUTROPHILS NFR BLD AUTO: 83 %
NEUTS BAND # BLD MANUAL: 1.92 X10*3/UL (ref 0–0.7)
NEUTS BAND # BLD MANUAL: 2.22 X10*3/UL (ref 0–0.7)
NEUTS BAND NFR BLD MANUAL: 5 %
NEUTS BAND NFR BLD MANUAL: 5 %
NEUTS SEG # BLD MANUAL: 32.17 X10*3/UL (ref 1.2–7)
NEUTS SEG # BLD MANUAL: 39.43 X10*3/UL (ref 1.2–7)
NEUTS SEG NFR BLD MANUAL: 84 %
NEUTS SEG NFR BLD MANUAL: 89 %
NEUTS VAC BLD QL SMEAR: PRESENT
NEUTS VAC BLD QL SMEAR: PRESENT
NRBC BLD-RTO: 0.2 /100 WBCS (ref 0–0)
NRBC BLD-RTO: 0.3 /100 WBCS (ref 0–0)
NRBC BLD-RTO: 0.5 /100 WBCS (ref 0–0)
OXYHGB MFR BLDA: 95.9 % (ref 94–98)
OXYHGB MFR BLDA: 96.1 % (ref 94–98)
OXYHGB MFR BLDA: 96.5 % (ref 94–98)
OXYHGB MFR BLDA: 96.6 % (ref 94–98)
OXYHGB MFR BLDA: 97 % (ref 94–98)
OXYHGB MFR BLDA: 97.1 % (ref 94–98)
OXYHGB MFR BLDA: 97.7 % (ref 94–98)
OXYHGB MFR BLDA: 98.3 %
OXYHGB MFR BLDA: 98.7 % (ref 94–98)
OXYHGB MFR BLDV: 64.5 % (ref 45–75)
OXYHGB MFR BLDV: 80.4 % (ref 45–75)
OXYHGB MFR BLDV: 84.9 % (ref 45–75)
OXYHGB MFR BLDV: 94.5 %
PCO2 BLDA: 20 MM HG (ref 38–42)
PCO2 BLDA: 23 MM HG
PCO2 BLDA: 24 MM HG (ref 38–42)
PCO2 BLDA: 28 MM HG (ref 38–42)
PCO2 BLDA: 32 MM HG (ref 38–42)
PCO2 BLDA: 36 MM HG (ref 38–42)
PCO2 BLDA: 45 MM HG (ref 38–42)
PCO2 BLDA: 49 MM HG (ref 38–42)
PCO2 BLDA: 58 MM HG (ref 38–42)
PCO2 BLDV: 26 MM HG
PCO2 BLDV: 61 MM HG (ref 41–51)
PCO2 BLDV: 71 MM HG (ref 41–51)
PCO2 BLDV: 76 MM HG (ref 41–51)
PEEP CMH2O: 15 CM H2O
PEEP CMH2O: 5 CM H2O
PH BLDA: 7.02 PH (ref 7.38–7.42)
PH BLDA: 7.1 PH (ref 7.38–7.42)
PH BLDA: 7.1 PH (ref 7.38–7.42)
PH BLDA: 7.17 PH (ref 7.38–7.42)
PH BLDA: 7.25 PH (ref 7.38–7.42)
PH BLDA: 7.26 PH
PH BLDA: 7.31 PH (ref 7.38–7.42)
PH BLDA: 7.32 PH (ref 7.38–7.42)
PH BLDA: 7.37 PH (ref 7.38–7.42)
PH BLDV: 6.98 PH (ref 7.33–7.43)
PH BLDV: 7.08 PH (ref 7.33–7.43)
PH BLDV: 7.09 PH (ref 7.33–7.43)
PH BLDV: 7.23 PH
PHOSPHATE SERPL-MCNC: 5 MG/DL (ref 2.5–4.9)
PHOSPHATE SERPL-MCNC: 7.4 MG/DL (ref 2.5–4.9)
PHOSPHATE SERPL-MCNC: 7.5 MG/DL (ref 2.5–4.9)
PLATELET # BLD AUTO: 231 X10*3/UL (ref 150–450)
PLATELET # BLD AUTO: 285 X10*3/UL (ref 150–450)
PLATELET # BLD AUTO: 421 X10*3/UL (ref 150–450)
PO2 BLDA: 104 MM HG (ref 85–95)
PO2 BLDA: 105 MM HG (ref 85–95)
PO2 BLDA: 105 MM HG (ref 85–95)
PO2 BLDA: 108 MM HG (ref 85–95)
PO2 BLDA: 188 MM HG (ref 85–95)
PO2 BLDA: 278 MM HG (ref 85–95)
PO2 BLDA: 369 MM HG (ref 85–95)
PO2 BLDA: 422 MM HG
PO2 BLDA: 97 MM HG (ref 85–95)
PO2 BLDV: 49 MM HG (ref 35–45)
PO2 BLDV: 58 MM HG (ref 35–45)
PO2 BLDV: 65 MM HG (ref 35–45)
PO2 BLDV: 84 MM HG
POLYCHROMASIA BLD QL SMEAR: ABNORMAL
POLYCHROMASIA BLD QL SMEAR: ABNORMAL
POTASSIUM BLDA-SCNC: 2.6 MMOL/L (ref 3.5–5.3)
POTASSIUM BLDA-SCNC: 2.8 MMOL/L (ref 3.5–5.3)
POTASSIUM BLDA-SCNC: 2.9 MMOL/L (ref 3.5–5.3)
POTASSIUM BLDA-SCNC: 3.2 MMOL/L (ref 3.5–5.3)
POTASSIUM BLDA-SCNC: 3.2 MMOL/L (ref 3.5–5.3)
POTASSIUM BLDA-SCNC: 4 MMOL/L (ref 3.5–5.3)
POTASSIUM BLDA-SCNC: 4.2 MMOL/L (ref 3.5–5.3)
POTASSIUM BLDV-SCNC: 3.3 MMOL/L (ref 3.5–5.3)
POTASSIUM BLDV-SCNC: 3.4 MMOL/L (ref 3.5–5.3)
POTASSIUM BLDV-SCNC: 3.9 MMOL/L (ref 3.5–5.3)
POTASSIUM BLDV-SCNC: 4.2 MMOL/L (ref 3.5–5.3)
POTASSIUM SERPL-SCNC: 2.9 MMOL/L (ref 3.5–5.3)
POTASSIUM SERPL-SCNC: 3 MMOL/L (ref 3.5–5.3)
POTASSIUM SERPL-SCNC: 3.6 MMOL/L (ref 3.5–5.3)
PROT SERPL-MCNC: 4.5 G/DL (ref 6.4–8.2)
PROT SERPL-MCNC: 5.9 G/DL (ref 6.4–8.2)
PROTHROMBIN TIME: 13.9 SECONDS (ref 9.3–12.7)
PROTHROMBIN TIME: 18.6 SECONDS (ref 9.3–12.7)
PROTHROMBIN TIME: 20.3 SECONDS (ref 9.3–12.7)
RBC # BLD AUTO: 3.32 X10*6/UL (ref 4.5–5.9)
RBC # BLD AUTO: 3.98 X10*6/UL (ref 4.5–5.9)
RBC # BLD AUTO: 4.21 X10*6/UL (ref 4.5–5.9)
RBC MORPH BLD: ABNORMAL
RBC MORPH BLD: ABNORMAL
RH FACTOR (ANTIGEN D): NORMAL
RH FACTOR (ANTIGEN D): NORMAL
RIGHT VENTRICLE PEAK SYSTOLIC PRESSURE: 38 MMHG
RSV RNA RESP QL NAA+PROBE: NOT DETECTED
SAO2 % BLDA: 100 %
SAO2 % BLDA: 100 % (ref 94–100)
SAO2 % BLDA: 100 % (ref 94–100)
SAO2 % BLDA: 97 % (ref 94–100)
SAO2 % BLDA: 98 % (ref 94–100)
SAO2 % BLDA: 99 % (ref 94–100)
SAO2 % BLDA: 99 % (ref 94–100)
SAO2 % BLDV: 65 % (ref 45–75)
SAO2 % BLDV: 82 % (ref 45–75)
SAO2 % BLDV: 87 % (ref 45–75)
SAO2 % BLDV: 97 %
SARS-COV-2 RNA RESP QL NAA+PROBE: NOT DETECTED
SODIUM BLDA-SCNC: 129 MMOL/L (ref 136–145)
SODIUM BLDA-SCNC: 131 MMOL/L (ref 136–145)
SODIUM BLDA-SCNC: 132 MMOL/L (ref 136–145)
SODIUM BLDA-SCNC: 132 MMOL/L (ref 136–145)
SODIUM BLDA-SCNC: 133 MMOL/L (ref 136–145)
SODIUM BLDA-SCNC: 133 MMOL/L (ref 136–145)
SODIUM BLDA-SCNC: 135 MMOL/L (ref 136–145)
SODIUM BLDA-SCNC: 135 MMOL/L (ref 136–145)
SODIUM BLDA-SCNC: 136 MMOL/L (ref 136–145)
SODIUM BLDV-SCNC: 134 MMOL/L (ref 136–145)
SODIUM BLDV-SCNC: 138 MMOL/L (ref 136–145)
SODIUM SERPL-SCNC: 138 MMOL/L (ref 136–145)
SODIUM SERPL-SCNC: 139 MMOL/L (ref 136–145)
SODIUM SERPL-SCNC: 143 MMOL/L (ref 136–145)
SPECIMEN DRAWN FROM PATIENT: ABNORMAL
TIDAL VOLUME: 450 ML
TIDAL VOLUME: 450 ML
TIDAL VOLUME: 480 ML
TOTAL CELLS COUNTED BLD: 100
TOTAL CELLS COUNTED BLD: 100
TOXIC GRANULES BLD QL SMEAR: PRESENT
TOXIC GRANULES BLD QL SMEAR: PRESENT
URATE SERPL-MCNC: 6 MG/DL (ref 4–7.5)
VENTILATOR MODE: ABNORMAL
VENTILATOR RATE: 15 BPM
VENTILATOR RATE: 16 BPM
VENTILATOR RATE: 22 BPM
WBC # BLD AUTO: 31.3 X10*3/UL (ref 4.4–11.3)
WBC # BLD AUTO: 38.3 X10*3/UL (ref 4.4–11.3)
WBC # BLD AUTO: 44.3 X10*3/UL (ref 4.4–11.3)

## 2024-11-10 PROCEDURE — 85520 HEPARIN ASSAY: CPT | Performed by: STUDENT IN AN ORGANIZED HEALTH CARE EDUCATION/TRAINING PROGRAM

## 2024-11-10 PROCEDURE — 37799 UNLISTED PX VASCULAR SURGERY: CPT | Performed by: SURGERY

## 2024-11-10 PROCEDURE — 82330 ASSAY OF CALCIUM: CPT | Performed by: STUDENT IN AN ORGANIZED HEALTH CARE EDUCATION/TRAINING PROGRAM

## 2024-11-10 PROCEDURE — 99291 CRITICAL CARE FIRST HOUR: CPT

## 2024-11-10 PROCEDURE — 85610 PROTHROMBIN TIME: CPT | Performed by: STUDENT IN AN ORGANIZED HEALTH CARE EDUCATION/TRAINING PROGRAM

## 2024-11-10 PROCEDURE — 2020000001 HC ICU ROOM DAILY

## 2024-11-10 PROCEDURE — 2500000002 HC RX 250 W HCPCS SELF ADMINISTERED DRUGS (ALT 637 FOR MEDICARE OP, ALT 636 FOR OP/ED)

## 2024-11-10 PROCEDURE — 2500000001 HC RX 250 WO HCPCS SELF ADMINISTERED DRUGS (ALT 637 FOR MEDICARE OP): Performed by: INTERNAL MEDICINE

## 2024-11-10 PROCEDURE — 83735 ASSAY OF MAGNESIUM: CPT | Performed by: SURGERY

## 2024-11-10 PROCEDURE — 84132 ASSAY OF SERUM POTASSIUM: CPT | Performed by: INTERNAL MEDICINE

## 2024-11-10 PROCEDURE — 2500000004 HC RX 250 GENERAL PHARMACY W/ HCPCS (ALT 636 FOR OP/ED): Performed by: SURGERY

## 2024-11-10 PROCEDURE — 9420000001 HC RT PATIENT EDUCATION 5 MIN

## 2024-11-10 PROCEDURE — 94799 UNLISTED PULMONARY SVC/PX: CPT

## 2024-11-10 PROCEDURE — 84132 ASSAY OF SERUM POTASSIUM: CPT

## 2024-11-10 PROCEDURE — 36415 COLL VENOUS BLD VENIPUNCTURE: CPT | Performed by: PHYSICIAN ASSISTANT

## 2024-11-10 PROCEDURE — 2500000002 HC RX 250 W HCPCS SELF ADMINISTERED DRUGS (ALT 637 FOR MEDICARE OP, ALT 636 FOR OP/ED): Performed by: INTERNAL MEDICINE

## 2024-11-10 PROCEDURE — 85027 COMPLETE CBC AUTOMATED: CPT | Performed by: SURGERY

## 2024-11-10 PROCEDURE — 94660 CPAP INITIATION&MGMT: CPT

## 2024-11-10 PROCEDURE — 93308 TTE F-UP OR LMTD: CPT | Performed by: INTERNAL MEDICINE

## 2024-11-10 PROCEDURE — 82040 ASSAY OF SERUM ALBUMIN: CPT | Performed by: NURSE PRACTITIONER

## 2024-11-10 PROCEDURE — 85007 BL SMEAR W/DIFF WBC COUNT: CPT

## 2024-11-10 PROCEDURE — 85730 THROMBOPLASTIN TIME PARTIAL: CPT | Performed by: PHYSICIAN ASSISTANT

## 2024-11-10 PROCEDURE — 31500 INSERT EMERGENCY AIRWAY: CPT

## 2024-11-10 PROCEDURE — 87081 CULTURE SCREEN ONLY: CPT | Mod: WESLAB

## 2024-11-10 PROCEDURE — 84132 ASSAY OF SERUM POTASSIUM: CPT | Performed by: PHYSICIAN ASSISTANT

## 2024-11-10 PROCEDURE — 71045 X-RAY EXAM CHEST 1 VIEW: CPT | Performed by: RADIOLOGY

## 2024-11-10 PROCEDURE — 83615 LACTATE (LD) (LDH) ENZYME: CPT | Performed by: STUDENT IN AN ORGANIZED HEALTH CARE EDUCATION/TRAINING PROGRAM

## 2024-11-10 PROCEDURE — 82947 ASSAY GLUCOSE BLOOD QUANT: CPT

## 2024-11-10 PROCEDURE — 83735 ASSAY OF MAGNESIUM: CPT

## 2024-11-10 PROCEDURE — 37799 UNLISTED PX VASCULAR SURGERY: CPT | Performed by: STUDENT IN AN ORGANIZED HEALTH CARE EDUCATION/TRAINING PROGRAM

## 2024-11-10 PROCEDURE — 84100 ASSAY OF PHOSPHORUS: CPT

## 2024-11-10 PROCEDURE — 85007 BL SMEAR W/DIFF WBC COUNT: CPT | Performed by: SURGERY

## 2024-11-10 PROCEDURE — 82330 ASSAY OF CALCIUM: CPT | Performed by: SURGERY

## 2024-11-10 PROCEDURE — 85027 COMPLETE CBC AUTOMATED: CPT | Performed by: STUDENT IN AN ORGANIZED HEALTH CARE EDUCATION/TRAINING PROGRAM

## 2024-11-10 PROCEDURE — 36600 WITHDRAWAL OF ARTERIAL BLOOD: CPT

## 2024-11-10 PROCEDURE — 83735 ASSAY OF MAGNESIUM: CPT | Performed by: PHYSICIAN ASSISTANT

## 2024-11-10 PROCEDURE — 94002 VENT MGMT INPAT INIT DAY: CPT

## 2024-11-10 PROCEDURE — 2500000005 HC RX 250 GENERAL PHARMACY W/O HCPCS

## 2024-11-10 PROCEDURE — 84100 ASSAY OF PHOSPHORUS: CPT | Performed by: SURGERY

## 2024-11-10 PROCEDURE — 93010 ELECTROCARDIOGRAM REPORT: CPT | Performed by: INTERNAL MEDICINE

## 2024-11-10 PROCEDURE — 33949 ECMO/ECLS DAILY MGMT ARTERY: CPT | Performed by: STUDENT IN AN ORGANIZED HEALTH CARE EDUCATION/TRAINING PROGRAM

## 2024-11-10 PROCEDURE — 83605 ASSAY OF LACTIC ACID: CPT | Performed by: SURGERY

## 2024-11-10 PROCEDURE — 87637 SARSCOV2&INF A&B&RSV AMP PRB: CPT

## 2024-11-10 PROCEDURE — 85610 PROTHROMBIN TIME: CPT

## 2024-11-10 PROCEDURE — 36556 INSERT NON-TUNNEL CV CATH: CPT

## 2024-11-10 PROCEDURE — 2500000004 HC RX 250 GENERAL PHARMACY W/ HCPCS (ALT 636 FOR OP/ED)

## 2024-11-10 PROCEDURE — 84484 ASSAY OF TROPONIN QUANT: CPT | Performed by: PHYSICIAN ASSISTANT

## 2024-11-10 PROCEDURE — 93005 ELECTROCARDIOGRAM TRACING: CPT

## 2024-11-10 PROCEDURE — 85730 THROMBOPLASTIN TIME PARTIAL: CPT | Performed by: INTERNAL MEDICINE

## 2024-11-10 PROCEDURE — 71045 X-RAY EXAM CHEST 1 VIEW: CPT

## 2024-11-10 PROCEDURE — 36415 COLL VENOUS BLD VENIPUNCTURE: CPT | Performed by: INTERNAL MEDICINE

## 2024-11-10 PROCEDURE — 84132 ASSAY OF SERUM POTASSIUM: CPT | Performed by: STUDENT IN AN ORGANIZED HEALTH CARE EDUCATION/TRAINING PROGRAM

## 2024-11-10 PROCEDURE — 85384 FIBRINOGEN ACTIVITY: CPT | Performed by: STUDENT IN AN ORGANIZED HEALTH CARE EDUCATION/TRAINING PROGRAM

## 2024-11-10 PROCEDURE — 85610 PROTHROMBIN TIME: CPT | Performed by: SURGERY

## 2024-11-10 PROCEDURE — 2500000004 HC RX 250 GENERAL PHARMACY W/ HCPCS (ALT 636 FOR OP/ED): Performed by: PHYSICIAN ASSISTANT

## 2024-11-10 PROCEDURE — 93325 DOPPLER ECHO COLOR FLOW MAPG: CPT

## 2024-11-10 PROCEDURE — 93321 DOPPLER ECHO F-UP/LMTD STD: CPT | Performed by: INTERNAL MEDICINE

## 2024-11-10 PROCEDURE — 82248 BILIRUBIN DIRECT: CPT | Performed by: STUDENT IN AN ORGANIZED HEALTH CARE EDUCATION/TRAINING PROGRAM

## 2024-11-10 PROCEDURE — 84132 ASSAY OF SERUM POTASSIUM: CPT | Performed by: SURGERY

## 2024-11-10 PROCEDURE — 2500000004 HC RX 250 GENERAL PHARMACY W/ HCPCS (ALT 636 FOR OP/ED): Mod: JZ

## 2024-11-10 PROCEDURE — 99233 SBSQ HOSP IP/OBS HIGH 50: CPT | Performed by: INTERNAL MEDICINE

## 2024-11-10 PROCEDURE — 99291 CRITICAL CARE FIRST HOUR: CPT | Performed by: STUDENT IN AN ORGANIZED HEALTH CARE EDUCATION/TRAINING PROGRAM

## 2024-11-10 PROCEDURE — 84145 PROCALCITONIN (PCT): CPT | Mod: WESLAB

## 2024-11-10 PROCEDURE — 86901 BLOOD TYPING SEROLOGIC RH(D): CPT

## 2024-11-10 PROCEDURE — 36620 INSERTION CATHETER ARTERY: CPT

## 2024-11-10 PROCEDURE — 82330 ASSAY OF CALCIUM: CPT

## 2024-11-10 PROCEDURE — 83605 ASSAY OF LACTIC ACID: CPT

## 2024-11-10 PROCEDURE — 84484 ASSAY OF TROPONIN QUANT: CPT

## 2024-11-10 PROCEDURE — 93325 DOPPLER ECHO COLOR FLOW MAPG: CPT | Performed by: INTERNAL MEDICINE

## 2024-11-10 PROCEDURE — 2500000004 HC RX 250 GENERAL PHARMACY W/ HCPCS (ALT 636 FOR OP/ED): Performed by: INTERNAL MEDICINE

## 2024-11-10 PROCEDURE — 2500000005 HC RX 250 GENERAL PHARMACY W/O HCPCS: Performed by: SURGERY

## 2024-11-10 PROCEDURE — 83605 ASSAY OF LACTIC ACID: CPT | Performed by: INTERNAL MEDICINE

## 2024-11-10 PROCEDURE — 94640 AIRWAY INHALATION TREATMENT: CPT

## 2024-11-10 PROCEDURE — 85384 FIBRINOGEN ACTIVITY: CPT

## 2024-11-10 PROCEDURE — 83735 ASSAY OF MAGNESIUM: CPT | Performed by: STUDENT IN AN ORGANIZED HEALTH CARE EDUCATION/TRAINING PROGRAM

## 2024-11-10 PROCEDURE — 37799 UNLISTED PX VASCULAR SURGERY: CPT

## 2024-11-10 PROCEDURE — 82040 ASSAY OF SERUM ALBUMIN: CPT | Performed by: STUDENT IN AN ORGANIZED HEALTH CARE EDUCATION/TRAINING PROGRAM

## 2024-11-10 PROCEDURE — 83880 ASSAY OF NATRIURETIC PEPTIDE: CPT

## 2024-11-10 PROCEDURE — 99232 SBSQ HOSP IP/OBS MODERATE 35: CPT | Performed by: INTERNAL MEDICINE

## 2024-11-10 PROCEDURE — 87205 SMEAR GRAM STAIN: CPT | Mod: WESLAB | Performed by: SURGERY

## 2024-11-10 PROCEDURE — 86901 BLOOD TYPING SEROLOGIC RH(D): CPT | Performed by: STUDENT IN AN ORGANIZED HEALTH CARE EDUCATION/TRAINING PROGRAM

## 2024-11-10 PROCEDURE — 85384 FIBRINOGEN ACTIVITY: CPT | Performed by: SURGERY

## 2024-11-10 PROCEDURE — 85027 COMPLETE CBC AUTOMATED: CPT

## 2024-11-10 PROCEDURE — 85025 COMPLETE CBC W/AUTO DIFF WBC: CPT | Performed by: PHYSICIAN ASSISTANT

## 2024-11-10 PROCEDURE — 2500000005 HC RX 250 GENERAL PHARMACY W/O HCPCS: Performed by: INTERNAL MEDICINE

## 2024-11-10 RX ORDER — EPINEPHRINE HCL IN DEXTROSE 5% 4MG/250ML
2 PLASTIC BAG, INJECTION (ML) INTRAVENOUS CONTINUOUS
Status: DISCONTINUED | OUTPATIENT
Start: 2024-11-10 | End: 2024-11-10

## 2024-11-10 RX ORDER — CALCIUM GLUCONATE 20 MG/ML
2 INJECTION, SOLUTION INTRAVENOUS ONCE
Status: DISCONTINUED | OUTPATIENT
Start: 2024-11-10 | End: 2024-11-10 | Stop reason: HOSPADM

## 2024-11-10 RX ORDER — SODIUM BICARBONATE 1 MEQ/ML
100 SYRINGE (ML) INTRAVENOUS ONCE
Status: COMPLETED | OUTPATIENT
Start: 2024-11-10 | End: 2024-11-10

## 2024-11-10 RX ORDER — CALCIUM CHLORIDE INJECTION 100 MG/ML
INJECTION, SOLUTION INTRAVENOUS CODE/TRAUMA/SEDATION MEDICATION
Status: COMPLETED | OUTPATIENT
Start: 2024-11-10 | End: 2024-11-10

## 2024-11-10 RX ORDER — VANCOMYCIN HYDROCHLORIDE 500 MG/100ML
500 INJECTION, SOLUTION INTRAVENOUS ONCE
Status: DISCONTINUED | OUTPATIENT
Start: 2024-11-11 | End: 2024-11-12

## 2024-11-10 RX ORDER — POTASSIUM CHLORIDE 14.9 MG/ML
20 INJECTION INTRAVENOUS EVERY 6 HOURS PRN
Status: DISCONTINUED | OUTPATIENT
Start: 2024-11-10 | End: 2024-11-12 | Stop reason: HOSPADM

## 2024-11-10 RX ORDER — HYDROXYZINE PAMOATE 25 MG/1
25 CAPSULE ORAL 4 TIMES DAILY
Status: DISCONTINUED | OUTPATIENT
Start: 2024-11-10 | End: 2024-11-10

## 2024-11-10 RX ORDER — LINEZOLID 2 MG/ML
600 INJECTION, SOLUTION INTRAVENOUS EVERY 12 HOURS
Status: DISCONTINUED | OUTPATIENT
Start: 2024-11-10 | End: 2024-11-10 | Stop reason: HOSPADM

## 2024-11-10 RX ORDER — EPINEPHRINE 0.1 MG/ML
INJECTION INTRACARDIAC; INTRAVENOUS CODE/TRAUMA/SEDATION MEDICATION
Status: COMPLETED | OUTPATIENT
Start: 2024-11-10 | End: 2024-11-10

## 2024-11-10 RX ORDER — PANTOPRAZOLE SODIUM 40 MG/10ML
40 INJECTION, POWDER, LYOPHILIZED, FOR SOLUTION INTRAVENOUS
Status: DISCONTINUED | OUTPATIENT
Start: 2024-11-10 | End: 2024-11-10 | Stop reason: HOSPADM

## 2024-11-10 RX ORDER — MAGNESIUM SULFATE HEPTAHYDRATE 500 MG/ML
INJECTION, SOLUTION INTRAMUSCULAR; INTRAVENOUS CODE/TRAUMA/SEDATION MEDICATION
Status: COMPLETED | OUTPATIENT
Start: 2024-11-10 | End: 2024-11-10

## 2024-11-10 RX ORDER — DEXTROSE 50 % IN WATER (D50W) INTRAVENOUS SYRINGE
12.5
Status: DISCONTINUED | OUTPATIENT
Start: 2024-11-10 | End: 2024-11-10 | Stop reason: HOSPADM

## 2024-11-10 RX ORDER — POTASSIUM CHLORIDE 14.9 MG/ML
20 INJECTION INTRAVENOUS ONCE
Status: COMPLETED | OUTPATIENT
Start: 2024-11-10 | End: 2024-11-10

## 2024-11-10 RX ORDER — NOREPINEPHRINE BITARTRATE/D5W 8 MG/250ML
PLASTIC BAG, INJECTION (ML) INTRAVENOUS
Status: COMPLETED | OUTPATIENT
Start: 2024-11-10 | End: 2024-11-10

## 2024-11-10 RX ORDER — MEROPENEM 1 G/1
1 INJECTION, POWDER, FOR SOLUTION INTRAVENOUS EVERY 8 HOURS
Status: DISCONTINUED | OUTPATIENT
Start: 2024-11-10 | End: 2024-11-10 | Stop reason: HOSPADM

## 2024-11-10 RX ORDER — PROPOFOL 10 MG/ML
0-50 INJECTION, EMULSION INTRAVENOUS CONTINUOUS
Status: DISCONTINUED | OUTPATIENT
Start: 2024-11-10 | End: 2024-11-10 | Stop reason: HOSPADM

## 2024-11-10 RX ORDER — CALCIUM GLUCONATE 98 MG/ML
2 INJECTION, SOLUTION INTRAVENOUS ONCE
Status: DISCONTINUED | OUTPATIENT
Start: 2024-11-10 | End: 2024-11-10 | Stop reason: CLARIF

## 2024-11-10 RX ORDER — PROPOFOL 10 MG/ML
0-50 INJECTION, EMULSION INTRAVENOUS CONTINUOUS
Status: DISCONTINUED | OUTPATIENT
Start: 2024-11-11 | End: 2024-11-11

## 2024-11-10 RX ORDER — FENTANYL CITRATE 50 UG/ML
50 INJECTION, SOLUTION INTRAMUSCULAR; INTRAVENOUS
Status: DISPENSED | OUTPATIENT
Start: 2024-11-10 | End: 2024-11-10

## 2024-11-10 RX ORDER — CALCIUM GLUCONATE 20 MG/ML
2 INJECTION, SOLUTION INTRAVENOUS ONCE
Status: COMPLETED | OUTPATIENT
Start: 2024-11-10 | End: 2024-11-10

## 2024-11-10 RX ORDER — ACETAMINOPHEN 325 MG/1
650 TABLET ORAL EVERY 4 HOURS
Status: DISCONTINUED | OUTPATIENT
Start: 2024-11-11 | End: 2024-11-12 | Stop reason: HOSPADM

## 2024-11-10 RX ORDER — NOREPINEPHRINE BITARTRATE 0.06 MG/ML
0-1 INJECTION, SOLUTION INTRAVENOUS CONTINUOUS
Status: DISCONTINUED | OUTPATIENT
Start: 2024-11-10 | End: 2024-11-10 | Stop reason: HOSPADM

## 2024-11-10 RX ORDER — ROCURONIUM BROMIDE 10 MG/ML
INJECTION, SOLUTION INTRAVENOUS CODE/TRAUMA/SEDATION MEDICATION
Status: COMPLETED | OUTPATIENT
Start: 2024-11-10 | End: 2024-11-10

## 2024-11-10 RX ORDER — MAGNESIUM SULFATE HEPTAHYDRATE 40 MG/ML
4 INJECTION, SOLUTION INTRAVENOUS EVERY 6 HOURS PRN
Status: DISCONTINUED | OUTPATIENT
Start: 2024-11-10 | End: 2024-11-12 | Stop reason: HOSPADM

## 2024-11-10 RX ORDER — SODIUM BICARBONATE 1 MEQ/ML
50 SYRINGE (ML) INTRAVENOUS ONCE
Status: COMPLETED | OUTPATIENT
Start: 2024-11-10 | End: 2024-11-10

## 2024-11-10 RX ORDER — MAGNESIUM SULFATE HEPTAHYDRATE 40 MG/ML
2 INJECTION, SOLUTION INTRAVENOUS EVERY 6 HOURS PRN
Status: DISCONTINUED | OUTPATIENT
Start: 2024-11-10 | End: 2024-11-12 | Stop reason: HOSPADM

## 2024-11-10 RX ORDER — ALBUMIN HUMAN 50 G/1000ML
25 SOLUTION INTRAVENOUS ONCE
Status: COMPLETED | OUTPATIENT
Start: 2024-11-11 | End: 2024-11-11

## 2024-11-10 RX ORDER — POTASSIUM CHLORIDE 29.8 MG/ML
40 INJECTION INTRAVENOUS EVERY 6 HOURS PRN
Status: DISCONTINUED | OUTPATIENT
Start: 2024-11-10 | End: 2024-11-12 | Stop reason: HOSPADM

## 2024-11-10 RX ORDER — SODIUM CHLORIDE, SODIUM LACTATE, POTASSIUM CHLORIDE, CALCIUM CHLORIDE 600; 310; 30; 20 MG/100ML; MG/100ML; MG/100ML; MG/100ML
5 INJECTION, SOLUTION INTRAVENOUS CONTINUOUS
Status: ACTIVE | OUTPATIENT
Start: 2024-11-11 | End: 2024-11-11

## 2024-11-10 RX ORDER — FUROSEMIDE 10 MG/ML
40 INJECTION INTRAMUSCULAR; INTRAVENOUS ONCE
Status: COMPLETED | OUTPATIENT
Start: 2024-11-10 | End: 2024-11-10

## 2024-11-10 RX ORDER — NOREPINEPHRINE BITARTRATE/D5W 8 MG/250ML
0-1 PLASTIC BAG, INJECTION (ML) INTRAVENOUS CONTINUOUS
Status: DISCONTINUED | OUTPATIENT
Start: 2024-11-11 | End: 2024-11-12 | Stop reason: HOSPADM

## 2024-11-10 RX ORDER — HYDROMORPHONE HYDROCHLORIDE 1 MG/ML
0.2 INJECTION, SOLUTION INTRAMUSCULAR; INTRAVENOUS; SUBCUTANEOUS
Status: DISCONTINUED | OUTPATIENT
Start: 2024-11-10 | End: 2024-11-12 | Stop reason: HOSPADM

## 2024-11-10 RX ORDER — PANTOPRAZOLE SODIUM 40 MG/1
40 TABLET, DELAYED RELEASE ORAL
Status: DISCONTINUED | OUTPATIENT
Start: 2024-11-11 | End: 2024-11-12 | Stop reason: HOSPADM

## 2024-11-10 RX ORDER — MORPHINE SULFATE 2 MG/ML
1 INJECTION, SOLUTION INTRAMUSCULAR; INTRAVENOUS ONCE
Status: COMPLETED | OUTPATIENT
Start: 2024-11-10 | End: 2024-11-10

## 2024-11-10 RX ORDER — DEXTROSE 50 % IN WATER (D50W) INTRAVENOUS SYRINGE
25
Status: DISCONTINUED | OUTPATIENT
Start: 2024-11-10 | End: 2024-11-10 | Stop reason: HOSPADM

## 2024-11-10 RX ORDER — VANCOMYCIN HYDROCHLORIDE 1 G/200ML
1000 INJECTION, SOLUTION INTRAVENOUS ONCE
Status: COMPLETED | OUTPATIENT
Start: 2024-11-11 | End: 2024-11-11

## 2024-11-10 RX ORDER — AMOXICILLIN 250 MG
2 CAPSULE ORAL 2 TIMES DAILY
Status: DISCONTINUED | OUTPATIENT
Start: 2024-11-11 | End: 2024-11-12 | Stop reason: HOSPADM

## 2024-11-10 RX ORDER — ETOMIDATE 2 MG/ML
INJECTION INTRAVENOUS CODE/TRAUMA/SEDATION MEDICATION
Status: COMPLETED | OUTPATIENT
Start: 2024-11-10 | End: 2024-11-10

## 2024-11-10 RX ORDER — HEPARIN SODIUM 10000 [USP'U]/100ML
0-4000 INJECTION, SOLUTION INTRAVENOUS CONTINUOUS
Status: DISCONTINUED | OUTPATIENT
Start: 2024-11-11 | End: 2024-11-12 | Stop reason: HOSPADM

## 2024-11-10 RX ORDER — EPINEPHRINE IN 0.9 % SOD CHLOR 4MG/250ML
0-2 PLASTIC BAG, INJECTION (ML) INTRAVENOUS CONTINUOUS
Status: DISCONTINUED | OUTPATIENT
Start: 2024-11-11 | End: 2024-11-12 | Stop reason: HOSPADM

## 2024-11-10 RX ORDER — ACETAMINOPHEN 650 MG/1
650 SUPPOSITORY RECTAL EVERY 4 HOURS
Status: DISCONTINUED | OUTPATIENT
Start: 2024-11-11 | End: 2024-11-12 | Stop reason: HOSPADM

## 2024-11-10 RX ORDER — IPRATROPIUM BROMIDE AND ALBUTEROL SULFATE 2.5; .5 MG/3ML; MG/3ML
3 SOLUTION RESPIRATORY (INHALATION)
Status: DISCONTINUED | OUTPATIENT
Start: 2024-11-10 | End: 2024-11-10 | Stop reason: HOSPADM

## 2024-11-10 RX ORDER — MORPHINE SULFATE 2 MG/ML
2 INJECTION, SOLUTION INTRAMUSCULAR; INTRAVENOUS ONCE
Status: COMPLETED | OUTPATIENT
Start: 2024-11-10 | End: 2024-11-10

## 2024-11-10 RX ORDER — NALOXONE HYDROCHLORIDE 0.4 MG/ML
0.2 INJECTION, SOLUTION INTRAMUSCULAR; INTRAVENOUS; SUBCUTANEOUS EVERY 5 MIN PRN
Status: DISCONTINUED | OUTPATIENT
Start: 2024-11-10 | End: 2024-11-12 | Stop reason: HOSPADM

## 2024-11-10 RX ORDER — INDOMETHACIN 25 MG/1
CAPSULE ORAL CODE/TRAUMA/SEDATION MEDICATION
Status: COMPLETED | OUTPATIENT
Start: 2024-11-10 | End: 2024-11-10

## 2024-11-10 RX ORDER — HYDROXYZINE PAMOATE 25 MG/1
25 CAPSULE ORAL 3 TIMES DAILY
Status: DISCONTINUED | OUTPATIENT
Start: 2024-11-10 | End: 2024-11-10

## 2024-11-10 RX ORDER — POLYETHYLENE GLYCOL 3350 17 G/17G
17 POWDER, FOR SOLUTION ORAL DAILY
Status: DISCONTINUED | OUTPATIENT
Start: 2024-11-11 | End: 2024-11-12 | Stop reason: HOSPADM

## 2024-11-10 RX ORDER — PANTOPRAZOLE SODIUM 40 MG/10ML
40 INJECTION, POWDER, LYOPHILIZED, FOR SOLUTION INTRAVENOUS
Status: DISCONTINUED | OUTPATIENT
Start: 2024-11-11 | End: 2024-11-12 | Stop reason: HOSPADM

## 2024-11-10 RX ORDER — ALBUTEROL SULFATE 0.83 MG/ML
5 SOLUTION RESPIRATORY (INHALATION) EVERY 2 HOUR PRN
Status: DISCONTINUED | OUTPATIENT
Start: 2024-11-10 | End: 2024-11-10 | Stop reason: HOSPADM

## 2024-11-10 RX ORDER — NOREPINEPHRINE BITARTRATE/D5W 8 MG/250ML
0-.5 PLASTIC BAG, INJECTION (ML) INTRAVENOUS CONTINUOUS
Status: DISCONTINUED | OUTPATIENT
Start: 2024-11-10 | End: 2024-11-10 | Stop reason: DRUGHIGH

## 2024-11-10 RX ORDER — VANCOMYCIN HYDROCHLORIDE 1 G/20ML
INJECTION, POWDER, LYOPHILIZED, FOR SOLUTION INTRAVENOUS DAILY PRN
Status: DISCONTINUED | OUTPATIENT
Start: 2024-11-10 | End: 2024-11-12 | Stop reason: HOSPADM

## 2024-11-10 RX ORDER — FENTANYL CITRATE 50 UG/ML
50 INJECTION, SOLUTION INTRAMUSCULAR; INTRAVENOUS ONCE
Status: COMPLETED | OUTPATIENT
Start: 2024-11-10 | End: 2024-11-10

## 2024-11-10 ASSESSMENT — COGNITIVE AND FUNCTIONAL STATUS - GENERAL
DRESSING REGULAR LOWER BODY CLOTHING: A LITTLE
EATING MEALS: A LITTLE
STANDING UP FROM CHAIR USING ARMS: A LITTLE
HELP NEEDED FOR BATHING: A LITTLE
MOBILITY SCORE: 16
PERSONAL GROOMING: A LITTLE
MOVING TO AND FROM BED TO CHAIR: A LITTLE
DAILY ACTIVITIY SCORE: 18
DRESSING REGULAR UPPER BODY CLOTHING: A LITTLE
MOVING FROM LYING ON BACK TO SITTING ON SIDE OF FLAT BED WITH BEDRAILS: A LITTLE
TURNING FROM BACK TO SIDE WHILE IN FLAT BAD: A LITTLE
TOILETING: A LITTLE
CLIMB 3 TO 5 STEPS WITH RAILING: A LOT
WALKING IN HOSPITAL ROOM: A LOT

## 2024-11-10 ASSESSMENT — PAIN - FUNCTIONAL ASSESSMENT
PAIN_FUNCTIONAL_ASSESSMENT: CPOT (CRITICAL CARE PAIN OBSERVATION TOOL)
PAIN_FUNCTIONAL_ASSESSMENT: CPOT (CRITICAL CARE PAIN OBSERVATION TOOL)

## 2024-11-10 ASSESSMENT — PAIN SCALES - GENERAL
PAINLEVEL_OUTOF10: 0 - NO PAIN
PAINLEVEL_OUTOF10: 0 - NO PAIN

## 2024-11-10 NOTE — NURSING NOTE
Assumed care of patient. Patient resting quietly.  Wife at bedside call light in reach. RN adjusted medication per orders.

## 2024-11-10 NOTE — PROGRESS NOTES
"Carl Vazquez is a 59 y.o. male on day 3 of admission presenting with Acute pulmonary embolism with acute cor pulmonale (Multi).    Subjective   Resting comfortably.  Was given Mucinex last evening and had some trouble with secretions which precipitated a anxiety attack.  Seems comfortable this morning.  He is on supplemental oxygen though saturating the high 90s.  Telemetry shows atrial fibrillation with reasonable rates.       Objective     Physical Exam   Gen: NAD   Neck: no JVD, carotid upstroke is brisk and without delay   Heart: Irregular, s1s2+ no mrg   Lungs: CTA   Ext: warm no edema  Last Recorded Vitals  Blood pressure 96/60, pulse 97, temperature 36.9 °C (98.4 °F), temperature source Temporal, resp. rate 18, height 1.803 m (5' 11\"), weight 72.4 kg (159 lb 9.8 oz), SpO2 100%.  Intake/Output last 3 Shifts:  I/O last 3 completed shifts:  In: 2836.7 (39.2 mL/kg) [P.O.:340; I.V.:1688.4 (23.3 mL/kg); IV Piggyback:808.3]  Out: 900 (12.4 mL/kg) [Urine:900 (0.3 mL/kg/hr)]  Weight: 72.4 kg       Assessment/Plan   Assessment & Plan  Acute pulmonary embolism with acute cor pulmonale (Multi)    Aortic valve disorder    Dyslipidemia    Hodgkin lymphoma    Hyperbilirubinemia    Mixed hyperlipidemia    Lung mass    Pulmonary hypertension (Multi)    Type 2 diabetes mellitus without complication (Multi)    Hx of replacement of aortic valve    NSTEMI (non-ST elevated myocardial infarction) (Multi)    Acute renal failure (ARF) (CMS-HCC)    Moderate protein-calorie malnutrition (Multi)    Acute cholecystitis    QT prolongation    Colitis    Empyema, right (Multi)    Pneumonia of right lower lobe due to infectious organism    Pleural effusion on right    Pulmonary abscess (Multi)    Leukocytosis    Alkaline phosphatase elevation    Prediabetes    11/8: The patient is a 59-year-old white male with a relatively extensive past medical history including non-Hodgkin's lymphoma treated with mediastinal radiation and " chemotherapy, premature coronary artery disease with CABG times 4/2007 in situ LIMA to LAD saphenous vein graft to PDA sequential radial artery to diagonal branch and obtuse marginal branch, redo sternotomy with CABG x 1 SVG to old RCA SVG and aortic valve replacement for critical aortic stenosis using 23 mm Saint Markel trifecta valve. The patient subsequently developed a recurrent loculated right-sided pleural effusion with lung entrapment and had a right sided lung decortication 11/29/2018. He was admitted with non-STEMI on 6/26/2020 and required PCI to drug-eluting stents to the RCA for a failed saphenous vein graft. Patient has not felt well for the past 1 month with persistent cough nonproductive not responding to antibiotic therapy. CT of the chest on 10/7/2024 negative for pulmonary embolism. Patient presented because of ongoing generalized malaise weakness weight loss intermittent fever and occasional hemoptysis. Repeat CT angiogram of the chest now positive for pulmonary embolization. There is also some concern for partial collapse of the right middle lobe and a bronchoscopy has been suggested to rule out endobronchial lesion. Preliminary review of echocardiogram does demonstrate moderate LV chamber dilatation hypokinesis free wall right ventricle flattening of interventricular septum consistent with right ventricular pressure overload although the preliminary estimate of the PA systolic pressure is 35 mmHg. Patient has been started on IV heparin which will be continued at least for 48 hours. If the patient is considered for a bronchoscopy then the IV heparin may need to be continued until that procedure has been performed before committing to converting into an oral DOAC. Patient currently on IV Zosyn and vancomycin for possible multifocal pneumonia. He will remain on preadmission aspirin but will hold Plavix for now. Will continue low-dose metoprolol plus high intensity atorvastatin.      11/9: Remains  hemodynamically stable, would continue heparin for therapeutic anticoagulation until after his bronchoscopy.  He is saturating well on room air.  Blood pressure and heart rate are adequate.  He does have evidence of RV strain and dysfunction however right ventricle has been enlarged dating back to 2018.  Will continue to follow and monitor his hemodynamics.    11/10: Stable hemodynamics and reasonable rate control.  Continue heparin with transition to Eliquis after his bronchoscopy.  No medication changes this morning.  Will follow.             Hill Begum, DO

## 2024-11-10 NOTE — PROCEDURES
INTUBATION    Indication(s):  -Hypercapnic respiratory failure  -Hypoxemic respiratory failure    Induction agents:  -Etomidate 15 mg  -Rocuronium 50 mg    Procedure:  Pt was on Bipap with 100% FiO2. RSI performed with cricoid pressure. Bipap removed and BMV performed with 100% O2 by ambu.  Induction agents as above. Video-laryngoscopy with S3, grade I view. Atraumatic intubation with #8.0 ETT @ 22 cm @ lips. +EtCO2 color change. Breath sound equal bilaterally. Patient tolerated well. No complications.    This note was prepared using voice recognition software. The details of this note are correct and have been reviewed, and corrected to the best of my ability. Some grammatical areas may persist related to the Dragon software.     I have reviewed all medications, laboratory results, and imaging pertinent for today's encounter.  Critical Care [facility] [place]   Dean Dorantes PA-C

## 2024-11-10 NOTE — PROCEDURES
Arterial Line Insertion    Indication: Titration of multiple vasopressor, radial arterial line failed.     Catheter: 20g, 12 cm    Procedure  The patient was prepped and draped in the normal sterile fashion.  The ultrasound probe was draped probe cover. Using ultrasound guidance, the right axillary artery was identified and cannulated. There was good pulsatile flow through the needle.  A wire was then introduced into the artery. The needle was removed and using the seldinger technique, the catheter was advanced over the wire.  The catheter was then secured with sutures and connected to the monitor with a good arterial waveform noted.  A dressing was applied.      Complications: none    This procedure was done using clean technique due to the emergency of the situation. Recommend exchange of cannula as soon as possible.  Multiple attempts were conducted to cannulate the right and left femoral artery but were unsuccessful with wire not feeding.  Procedures were aborted and pressure was held with Tegaderm applied    The patient tolerated the procedure well.    Dean Dorantes PA-C

## 2024-11-10 NOTE — H&P
[facility] Critical Care Medicine       Date:  11/10/2024  Patient:  Carl Vazquez  YOB: 1965  MRN:  [id number]   Admit Date:  11/7/2024      Chief Complaint   Patient presents with    Pneumonia     Patient diagnosed with pneumonia and has not gotten any better despite being on doxy and zpac. Can not be seen by pulmonologist until December. Primary doctor prescibed zpak         History of Present Illness:  aCrl Vazquez is a 59 y.o. year old male patient with Past Medical History of CABG x 4 with R pulmonary decortication/AV valve replacement, history of thoracotomy with pleurodesis for recurrent pleural effusions, NSTEMI 2020 with PCI to RCA due to RCA graft failing, patient is on antibiotics for leukocytosis, history of Hodgkin's lymphoma greater than 40 years ago, hypertension, type II DM, history of venous thrombus, and hypertension presented to [facility] emergency department for shortness of breath that has been ongoing for the previous month.  Patient completed 2 courses of antibiotics doxycycline and azithromycin he initially started to feel better, but returned significantly worse.  Per wife patient has been sick since October 7, 2024.    Hospital Course:   Patient was admitted to hospital service on heparin infusion.  Patient was noted to be on 2L nasal cannula alert and oriented by multiple services on 11/10/2024.  Patient then developed severe respiratory distress around 2 PM.  And transferred to the ICU.  Patient's initial ABG showed lactic acidosis with a lactate of 9.5. Patient was then transferred to the ICU.    Interval ICU Events:  11/10: Patient transferred to ICU for significant respiratory failure and lactic acidosis with hemodynamic compromise. Stat echo ordered. Patient immediately intubated upon arrival, vasopressor therapy started, amps of bicarb were given.  PERT team called most likely require shock team at this time.   Addendum: Patient was accepted by shock team.   ECMO team will come to [facility] ICU for cannulation of the patient and then patient will be transferred to [facility].    Subjective   Medical History:  Past Medical History:   Diagnosis Date    Aortic valve disorder 01/06/2015    Basal cell carcinoma 01/22/2024    Chest pain 01/22/2024    Chronic systolic heart failure 01/22/2024    Coronary arteriosclerosis 01/22/2024    Essential hypertension 01/22/2024    Hodgkin lymphoma 01/22/2024    Hx of replacement of aortic valve 01/22/2024    Mixed hyperlipidemia 02/23/2005    Pleural effusion 01/22/2024    Shortness of breath 01/22/2024    Type 2 diabetes mellitus without complication (Multi) 08/02/2023    Venous thrombosis 01/22/2024     Past Surgical History:   Procedure Laterality Date    CT GUIDED CHEST TUBE PLACEMENT  7/25/2018    CT GUIDED CHEST TUBE PLACEMENT [facility]     Medications Prior to Admission   Medication Sig Dispense Refill Last Dose/Taking    albuterol (Ventolin HFA) 90 mcg/actuation inhaler Inhale 2 puffs every 4 hours if needed for wheezing or shortness of breath. 18 g 0 11/7/2024    aspirin 81 mg EC tablet Take 1 tablet (81 mg) by mouth every other day.   11/6/2024 Morning    atorvastatin (Lipitor) 80 mg tablet Take 1 tablet (80 mg) by mouth once daily. 90 tablet 3 11/6/2024 Bedtime    clopidogrel (Plavix) 75 mg tablet TAKE 1 TABLET DAILY 90 tablet 3 11/7/2024 Morning    levothyroxine (Synthroid, Levoxyl) 25 mcg tablet TAKE 1 TABLET DAILY IN THE MORNING ON AN EMPTY STOMACH 90 tablet 0 11/7/2024 Morning    lisinopril 5 mg tablet Take 1 tablet (5 mg) by mouth once daily.   11/6/2024 Bedtime    metFORMIN, OSM, (Fortamet) 500 mg 24 hr tablet Take 1 tablet (500 mg) by mouth once daily in the evening. Take with meals. Do not crush, chew, or split.   11/6/2024 Bedtime    metoprolol tartrate (Lopressor) 25 mg tablet TAKE 1 TABLET TWICE A DAY WITH FOOD 180 tablet 3 11/7/2024 Morning    pantoprazole (ProtoNix) 40 mg EC tablet Take 1 tablet (40 mg)  by mouth once daily in the morning. Take before meals. 90 tablet 3 2024 Morning    PARoxetine (Paxil) 10 mg tablet TAKE 1 TABLET DAILY IN THE MORNING 90 tablet 0 2024 Morning    promethazine-DM (Phenergan-DM) 6.25-15 mg/5 mL syrup Take 5 mL by mouth every 4 hours if needed for cough. 118 mL 0 Past Week    sildenafil (Viagra) 50 mg tablet Take 1 tablet (50 mg) by mouth if needed for erectile dysfunction for up to 60 doses. 10 tablet 5 Past Month    torsemide (Demadex) 100 mg tablet Take 1 tablet (100 mg) by mouth once daily.   2024 Morning    [] azithromycin (Zithromax) 250 mg tablet Take 2 tablets (500 mg) by mouth once daily for 1 day, THEN 1 tablet (250 mg) once daily for 4 days. Take 2 tabs (500 mg) by mouth today, than 1 daily for 4 days.. 6 tablet 0     hydrOXYzine pamoate (VistariL) 25 mg capsule Take 1 capsule (25 mg) by mouth 3 times a day as needed for anxiety for up to 10 days. 30 capsule 0     multivitamin tablet Take 1 tablet by mouth once daily.        Ativan [lorazepam] and Phenobarbital  Social History     Tobacco Use    Smoking status: Never     Passive exposure: Never    Smokeless tobacco: Never   Substance Use Topics    Alcohol use: Yes     Alcohol/week: 6.0 standard drinks of alcohol     Types: 6 Cans of beer per week    Drug use: Never     Family History   Problem Relation Name Age of Onset    No Known Problems Mother      No Known Problems Father            Objective   Hospital Medications:    EPINEPHrine, 0-2 mcg/kg/min  EPINEPHrine, 0-1 mcg/kg/min, Last Rate: 2 mcg/kg/min (11/10/24 191)  heparin, 0-4,500 Units/hr, Last Rate: 1,200 Units/hr (11/10/24 170)  norepinephrine, 0-0.5 mcg/kg/min, Last Rate: 0.3 mcg/kg/min (11/10/24 1731)  propofol, 0-50 mcg/kg/min, Last Rate: 15 mcg/kg/min (11/10/24 1731)  sodium bicarbonate, 100 mL/hr, Last Rate: 100 mL/hr (11/10/24 172)  vasopressin, 0.03 Units/min, Last Rate: 0.03 Units/min (11/10/24 4904)          Current  Facility-Administered Medications:     acetaminophen (Tylenol) tablet 650 mg, 650 mg, oral, q6h PRN, Dean Dorantes, PA-C, 650 mg at 11/09/24 2056    albuterol 2.5 mg /3 mL (0.083 %) nebulizer solution 5 mg, 5 mg, nebulization, q2h PRN, Dean Dorantes, PA-C    [Held by provider] aspirin EC tablet 81 mg, 81 mg, oral, Every other day, Dean Dorantes, PA-C, 81 mg at 11/10/24 1005    [Held by provider] atorvastatin (Lipitor) tablet 80 mg, 80 mg, oral, Daily, Dean Dorantes, PA-C, 80 mg at 11/09/24 2056    calcium chloride 100 mg/mL (10 %) injection, , intravenous, Code/trauma/sedation med, Elizabeth Frye MD, 1 g at 11/10/24 1829    dextrose 50 % injection 12.5 g, 12.5 g, intravenous, q15 min PRN, Dean Dorantes, PA-C    dextrose 50 % injection 25 g, 25 g, intravenous, q15 min PRN, Dean Dorantes, PA-C    EPINEPHrine (Adrenalin) 10 mg in dextrose 5% 250 mL (0.04 mg/mL) infusion, 0-2 mcg/kg/min, intravenous, Continuous, Dean Dorantes, PA-C    EPINEPHrine (Adrenalin) 4 mg in dextrose 5% 250 mL (16 mcg/mL) infusion (premix), 0-1 mcg/kg/min, intravenous, Continuous, Dean Dorantes, PA-C, Last Rate: 543 mL/hr at 11/10/24 1911, 2 mcg/kg/min at 11/10/24 1911    fentaNYL PF (Sublimaze) injection 50 mcg, 50 mcg, intravenous, q15 min, Elizabeth Frye MD, 50 mcg at 11/10/24 1901    glucagon (Glucagen) injection 1 mg, 1 mg, intramuscular, q15 min PRN, Dean Dorantes, PA-C    heparin (porcine) injection 3,000-6,000 Units, 3,000-6,000 Units, intravenous, PRN, Dean Dorantes, PA-C    heparin 25,000 Units in dextrose 5% 250 mL (100 Units/mL) infusion (premix), 0-4,500 Units/hr, intravenous, Continuous, Dean Dorantes PA-C, Last Rate: 12 mL/hr at 11/10/24 1709, 1,200 Units/hr at 11/10/24 1709    hydrocortisone sodium succinate (PF) (Solu-CORTEF) injection 50 mg, 50 mg, intravenous, q6h, Elizabeth Frye MD, 50 mg at 11/10/24 1837    ipratropium-albuteroL (Duo-Neb) 0.5-2.5 mg/3 mL nebulizer solution 3 mL, 3 mL, nebulization, 4x daily, Dean Dorantes PA-C, 3 mL at  11/10/24 1535    levothyroxine (Synthroid, Levoxyl) tablet 25 mcg, 25 mcg, oral, Daily before breakfast, LINETTE Forrester-SASKIA, 25 mcg at 11/10/24 1005    linezolid (Zyvox)  mg in 300 mL, 600 mg, intravenous, q12h, Dean Dorantes PA-C    meropenem (Merrem) 1 g in sodium chloride 0.9%  mL, 1 g, intravenous, q8h, Dean Dorantes PA-C, Stopped at 11/10/24 1836    [Held by provider] metoprolol tartrate (Lopressor) tablet 25 mg, 25 mg, oral, BID, Dean Dorantes PA-C, 25 mg at 11/08/24 0858    norepinephrine (Levophed) 8 mg in dextrose 5% 250 mL (0.032 mg/mL) infusion (premix), 0-0.5 mcg/kg/min, intravenous, Continuous, Dean Dorantes PA-C, Last Rate: 40.7 mL/hr at 11/10/24 1731, 0.3 mcg/kg/min at 11/10/24 1731    oxygen (O2) therapy, , inhalation, Continuous PRN - O2/gases, Dean Dorantes PA-C, 100 percent at 11/10/24 1234    oxygen (O2) therapy, , inhalation, Continuous - Inhalation, LINETTE Forrester-C, 100 percent at 11/10/24 1536    [Held by provider] pantoprazole (ProtoNix) EC tablet 40 mg, 40 mg, oral, Daily before breakfast, LINETTE Forrester-C, 40 mg at 11/10/24 1005    pantoprazole (ProtoNix) injection 40 mg, 40 mg, intravenous, Daily before breakfast, LINETTE Forrester-C    [Held by provider] PARoxetine (Paxil) tablet 10 mg, 10 mg, oral, Daily before breakfast, Dean Dorantes PA-C, 10 mg at 11/10/24 1005    propofol (Diprivan) infusion, 0-50 mcg/kg/min, intravenous, Continuous, Elizabeth Frye MD, Last Rate: 6.52 mL/hr at 11/10/24 1731, 15 mcg/kg/min at 11/10/24 1731    sodium bicarbonate 1 mEq/mL (8.4 %) injection, , intravenous, Code/trauma/sedation med, Elizabeth Frye MD, 100 mEq at 11/10/24 1914    sodium bicarbonate 150 mEq in dextrose 5% 1,150 mL infusion, 100 mL/hr, intravenous, Continuous, Dean Dorantes PA-C, Last Rate: 100 mL/hr at 11/10/24 1725, 100 mL/hr at 11/10/24 1725    vasopressin (Vasostrict) 0.2 unit/mL in 5% dextrose 100 mL IV, 0.03 Units/min, intravenous, Continuous, Dean Dorantes PA-C, Last Rate: 9  mL/hr at 11/10/24 1734, 0.03 Units/min at 11/10/24 1734    Review of Systems:  Unable to conduct ROS.        Objective     Physical Exam:    Heart Rate:  []   Temp:  [36.7 °C (98.1 °F)-37.5 °C (99.5 °F)]   Resp:  [0-49]   BP: ()/()   Weight:  [72.4 kg (159 lb 9.8 oz)]   SpO2:  [58 %-100 %]     Physical Exam  Vitals and nursing note reviewed.   Constitutional:       General: He is in acute distress.      Appearance: He is ill-appearing and toxic-appearing.      Interventions: He is intubated and restrained.   HENT:      Nose: Nose normal. No congestion.      Mouth/Throat:      Mouth: Mucous membranes are dry.      Pharynx: No oropharyngeal exudate.   Eyes:      General: No scleral icterus.     Pupils: Pupils are equal, round, and reactive to light.   Cardiovascular:      Rate and Rhythm: Regular rhythm. Tachycardia present.      Pulses:           Radial pulses are 0 on the right side and 0 on the left side.        Femoral pulses are 1+ on the right side and 1+ on the left side.     Heart sounds: Murmur heard.      Comments: Bedside Cardiac POCUS: LV:RV ration 1:2, Estimated a4 EF 30%, VTI 10, IVC dilated non collapsible   Pulmonary:      Effort: He is intubated.      Comments: Pt is intubated   Abdominal:      General: Abdomen is flat. There is no distension.      Tenderness: There is no abdominal tenderness.   Musculoskeletal:         General: No swelling. Normal range of motion.      Right lower leg: No edema.      Left lower leg: No edema.   Skin:     Capillary Refill: Capillary refill takes more than 3 seconds.      Coloration: Skin is pale.      Findings: Bruising present.   Neurological:      General: No focal deficit present.      Mental Status: He is lethargic.   Psychiatric:      Comments: Unable to assess         Objective:    I have reviewed all medications, laboratory results, and imaging pertinent for today's encounter.    Vent Mode: Pressure regulated volume control/assist  control  FiO2 (%):  [100 %] 100 %  S RR:  [15-22] 22  S VT:  [450 mL-500 mL] 450 mL  PEEP/CPAP (cm H2O):  [5 cm H20] 5 cm H20  MAP (cm H2O):  [10-12] 12      Intake/Output Summary (Last 24 hours) at 11/10/2024 1915  Last data filed at 11/10/2024 1627  Gross per 24 hour   Intake 2662.47 ml   Output 200 ml   Net 2462.47 ml       Daily Labs:  CBC:   Results from last 7 days   Lab Units 11/10/24  1433   WBC AUTO x10*3/uL 31.3*   HEMOGLOBIN g/dL 12.9*   HEMATOCRIT % 38.4*   MCV fL 97     BMP:    Results from last 7 days   Lab Units 11/10/24  1433   SODIUM mmol/L 138   POTASSIUM mmol/L 3.6   CHLORIDE mmol/L 98   CO2 mmol/L 21   BUN mg/dL 18   CREATININE mg/dL 1.44*   CALCIUM mg/dL 8.6   GLUCOSE mg/dL 96   MAGNESIUM mg/dL 2.11                   Assessment/Plan:    I am currently managing this critically ill patient for the following problems:    Neuro/Psych/Pain Ctrl/Sedation:  Toxic metabolic encephalopathy   -Pain Control: fentanyl   -Sedation: propofol  -CAM Assessment every shift, Promote Sleep/wake hygiene    Respiratory/ENT:  Acute Hypoxic Respiratory Failure   Possible PE seen on CTA 11/9/2024, per team reviewed images stated suboptimal study unlikely to be PE at this time but unable to rule out recommended repeat CTA.  -Intubated settings: Vt 400, PEEP 5, FIO2 100%, RR 22   -Maintain SPO2 > 92%  -Promote Pulm Hygiene Daily     Cardiovascular:  Undifferentiated shock cardiogenic versus sepsis versus obstructive  :: TTE 11/8/24: LVEF 50 to 55%, reduced RV SF, moderately enlarged RV, moderate MR, mild to moderate TR  :: TTE 11/10/24: LVEF 20%, bowing of septum, unable to obtain TAPSE due to poor image quality  :: BNP elevated 2062  -Norepinephrine gtt., epinephrine gtt., vasopressin gtt. Maintain MAP greater than 65  -Continue bicarb infusion at 100 mL/hour  -Shock team initiated patient accepted for ECMO  -Continuous Cardiac Monitoring     GI:  Diet: NPO, OG to LISW  Ulcer Prophylaxis: PPI   Bowel Prophylaxis:  Miralax    Renal/Volume Status (Intra & Extravascular):  CHRIS oliguric   -Maintain UOP 0.5-1.0 mL/kg/hr, notify provider if less than ideal  -Daily BMP, Mag & Phos, Replete electrolytes as indicated     Endocrine  -POCT Glucose q4 while NPO    Infectious Disease:  Pneumonia with possible empyema  -Broaden antibiotics to Merrem and linezolid  -Patient received 1 dose of vancomycin and was on Zosyn prior  -Pro-Kvng pending  -Blood cultures pending  -Monitor for SIRS    Heme/Onc:  -INR 1.2  -Monitor for s/s of anemia  -CBC daily, Transfuse for Hgb < 7     OBGYN/MSK:  -Padded pressure points  -Skin Protocol per ICU     Ethics/Code Status:  Full Code discussed with wife.  Wife explained the patient was not an ECMO candidate they would pursue comfort care measures.    :  DVT Prophylaxis: Heparin GTT  GI Prophylaxis: PPI  Bowel Regimen: MiraLAX when able  Diet: N.p.o.  CVC: Left IJ  [place]: Right axillary using clean technique  [place]: Yes  Restraints: Yes  Dispo: ICU, transferred to CT ICU [facility]    Restraints indicated to maintain lines/tubes.  Alternative therapies have been attempted and have been ineffective.  Restrain with soft wrist restraints and side rails up x4 until medical devices discontinued and/or patient able to participate with plan of care.      This note was prepared using voice recognition software. The details of this note are correct and have been reviewed, and corrected to the best of my ability. Some grammatical areas may persist related to the Dragon software.     I have reviewed all medications, laboratory results, and imaging pertinent for today's encounter.  Plan Discussed with Dr. Frye  Critical Care Time: 100 minutes  Critical Care [place] Dorantes PA-C

## 2024-11-10 NOTE — PROGRESS NOTES
58yo male with complicated past medical history admitted with PE, empyema, lung mass  Called to bedside for rapid response.  Patient found to be in respiratory distress.  Patient found to be anxious, tachycardic with O2 saturation %  Given IV Morphine for air hunger without improvement  Placed on BiPAP  ABG obtained.  Showed ph of 7.25 and Lactic of 9.5  Patient transferred to ICU for critical care management.

## 2024-11-10 NOTE — NURSING NOTE
Called to bedside for c/o anxiety attack. Patient and wife standing at bedside. Patient is tachycardic and tachypneic;  using accesory muscles. Respiratory notified in to see patient. Patient place on bipap and ABG obtained. Dr. Gomes at bedside to evaluate. Medication orders received. Nurses remain at bedside.

## 2024-11-10 NOTE — H&P
This man with a sense of gurgling and coughed up a small amount of blood but this got him very anxious agitated tachypneic and tachycardic.  He is not getting his metoprolol because his blood pressure has been low.  He never desatted.  He was given 2 mg of Ativan and now he is kind of lethargic and bit confused.  He is calm and relaxed.  He is pulse oxing about 91%.      Moving forward I think 2 mg of Ativan is a bit much for him.  I am asking the nurse to put 2 L nasal cannula on him.  His wife will be spending most of the night here so that can be helpful.  Have asked the nurse to put him on continuous pulse ox with it reading out at the nurses station.    I also think it is interesting that blood pressure is too low to give him his metoprolol.  His echocardiogram shows normal EF and evidence of right heart strain..  Has not gotten any IV fluids today.  Presumably he is preload dependent so I will give him a little bit of preload.  500 cc normal saline followed by 100 cc/h maintenance.      Sage Navarro MD

## 2024-11-10 NOTE — NURSING NOTE
Wife stepped away from bedside patient became anxious.  Wife returned.  Patient calm. Call light at bedside.

## 2024-11-10 NOTE — NURSING NOTE
Patient given morning medications without difficulty. Assessment completed. Wife at bedside. Wife ordered breakfast. Patient sleepy still but easily arousal and answers all question appropriately.  Denies pain at this time.  Call light in reach

## 2024-11-10 NOTE — PROCEDURES
CENTRAL LINE PLACEMENT    Indication(s):  -Inadequate IV access  -Administration of vasoactive or caustic agents  -CVP monitoring    Site:  - Left internal jugular vein    Catheter: 7 Fr, 3 lumen, 20 cm radiopaque polyurethane    Sedation: none  Patient positioned supine +/- slight Trendelenburg.  Sterility: Chlorhexidine skin prep. Hat and mask on myself and assistant(s). Antiseptic hand foam. Sterile gown, gloves and drape.  Local anesthesia: none mL of 1% lidocaine subcutaneously.  Ultrasound-guided insertion:  -YES (with sterile ultrasound probe cover)    Seldinger technique with 18 Ga x 2.5 in introducer needle. Guidewire thread easily through introducer needle.  -Needle position confirmed to be intravenous by fall of blood to gravity within pressure transduction tubing.  -Guidewire position confirmed to be intravenous by visualization on ultrasound in short and long axis views.  Small skin incision adjacent to guidewire with #11 scalpel. 8.5 Fr tissue dilator over guidewire. Catheter thread easily over guidewire and guidewire removed. All ports aspirated for complete removal of air, then flushed with sterile NS.  Catheter secured with sutures @ 20 cm at skin.  -Transparent film dressing with CHG.  Sterility maintained throughout procedure. No complications. Patient tolerated well.    Chest x-ray  -done. Catheter in acceptable central venous position. No PTX. (My read).    This note was prepared using voice recognition software. The details of this note are correct and have been reviewed, and corrected to the best of my ability. Some grammatical areas may persist related to the Dragon software.     I have reviewed all medications, laboratory results, and imaging pertinent for today's encounter.  Critical Care [facility]   Dean Dorantes PA-C

## 2024-11-10 NOTE — NURSING NOTE
Icu nurse in to assess patient. New orders obtained for Lasix and morphine. Medication given per RN. Patient continues to require 1:1 monitoring and will be transferred to ICU. Patient and wife agreeable to plan. Patient transferred to ICU with Doctors;respiratory and Nurses at bedside.

## 2024-11-10 NOTE — PROCEDURES
Arterial Line Insertion    Indication: Titration of vasopressors, Multiple ABGs    Catheter: 20g, 5cm    Procedure  The patient was prepped and draped in the normal sterile fashion.  The ultrasound probe was draped with a sterile probe cover. Using ultrasound guidance, the right radial artery was identified and cannulated.  There was good pulsatile flow through the needle.  A wire was then introduced into the artery.  The needle was removed and using the seldinger technique, the catheter was advanced over the wire.  The catheter was then secured with sutures and connected to the monitor with a good arterial waveform noted.  A dressing was applied.      Complications: none      The patient tolerated the procedure well.    Dean Dorantes PA-C

## 2024-11-10 NOTE — SIGNIFICANT EVENT
"PULMONARY EMBOLISM RESPONSE TEAM (PERT) NOTE    This note represents a summary of a virtual evaluation by the pulmonary embolism response team requested.  Suggestions and impression are summarized from the initial virtual encounter and discussion with the referring medical team. These suggestions supplement but should not be a substitute for bedside evaluation and management by the attending of record.     PERT Members on the Call:  Critical Care Attending  PERT Interventional Cardiology Attending  Vascular Medicine Attending  CICU Attending  Critical Care Fellow    Brief Clinical Summary:  Carl Vazquez is a 59 y.o. male presenting with PE.    Vital Signs  BP (!) 75/42   Pulse (!) 128   Temp 36.9 °C (98.4 °F) (Temporal)   Resp 21   Ht 1.803 m (5' 11\")   Wt 72.4 kg (159 lb 9.8 oz)   SpO2 95%   BMI 22.26 kg/m²      Laboratory  Recent Labs     11/10/24  1433 11/07/24  1931 11/07/24  1751 11/07/24  1644 10/07/24  2316 10/07/24  2202   TROPHS 983*  --  151* 182*   < > 11   BNP  --   --   --  1,095*  --  520*   LACTATE  --  1.9  --   --   --   --     < > = values in this interval not displayed.         PESI Score Bridger KEITA. Et al. Arch Intern Med. 2010;[id number].           PESI Score:  259, consistent with JLPESIRISK: Class V, or Very High risk (10-24.5% 30-day mortality risk) PE.    CT Scan reviewed:  Low attenuating regions concerning for pulmonary emboli within the  distal right main pulmonary artery and segmental arteries of the  right middle and lower lobes. This may in part be related  to/accentuated by mixing artifact.  2. CT findings concerning for right heart strain.   RV/LV ratio > 1 by CT scan    TTE reviewed (if available):  Not available     Venous duplex (if available):  No DVT     Contraindications to anticoagulation: none   Contraindications to thrombolytics: none    Initial Impressions:  ERS/ESC Pulmonary Embolism Risk Category: JLPECLASS: High risk.      Initial " Suggestions/Plans:  Initial therapy suggested: Heparin.  Additional testing recommended: Repeat CTPA when stable.  Consults suggested: Cardiogenic shock team to assess for possible ECHO then will will defer to primary team to decide it he will need to be transferred to [facility].    To re conference the PERT team please call the  Transfer Center at [phone].

## 2024-11-10 NOTE — PROGRESS NOTES
"Carl Vazquez is a 59 y.o. male on day 3 of admission presenting with Acute pulmonary embolism with acute cor pulmonale (Multi).    Subjective   Period of hemoptysis last night.  Drop pulse oximetry to low 90s he feels tired he did not sleep well last night.  His wife told me that he does like the Mucinex as she thought that this is causing him to cough up blood more.  He has no chest pain orthopnea or PND.       Objective     Physical Exam  Constitutional:  Pleasant  Eyes: PERRL, EOMI,   ENMT: mucous membranes moist. Mild temporal Wasting   Head/Neck: Neck supple, No JVD,   Respiratory/Thorax: Diminished Breath sounds Right lung base. With crackles at the same area   Cardiovascular: Regular, rate and rhythm, no murmurs  Gastrointestinal: Soft, non-distended, +BS.  Musculoskeletal: ROM intact, no joint swelling, normal strength  Extremities: peripheral pulses intact; no edema  Neurological: Alert and Oriented x 3; no focal deficits; gross motor and sensation intact; CN II-XII intact. No asterixis.  Psychological: Appropriate mood and behavior  Skin: No lesions, No rashes.     Last Recorded Vitals  Blood pressure 96/60, pulse 97, temperature 36.9 °C (98.4 °F), temperature source Temporal, resp. rate 18, height 1.803 m (5' 11\"), weight 72.4 kg (159 lb 9.8 oz), SpO2 100%.  Intake/Output last 3 Shifts:  I/O last 3 completed shifts:  In: 2836.7 (39.2 mL/kg) [P.O.:340; I.V.:1688.4 (23.3 mL/kg); IV Piggyback:808.3]  Out: 900 (12.4 mL/kg) [Urine:900 (0.3 mL/kg/hr)]  Weight: 72.4 kg     Relevant Results    Scheduled medications  aspirin, 81 mg, oral, Every other day  atorvastatin, 80 mg, oral, Daily  hydrOXYzine pamoate, 25 mg, oral, TID  levothyroxine, 25 mcg, oral, Daily before breakfast  metoprolol tartrate, 25 mg, oral, BID  pantoprazole, 40 mg, oral, Daily before breakfast  PARoxetine, 10 mg, oral, Daily before breakfast  piperacillin-tazobactam, 3.375 g, intravenous, q6h JENNIFER  potassium chloride, 20 mEq, " intravenous, Once      Continuous medications  heparin, 0-4,500 Units/hr, Last Rate: 1,000 Units/hr (11/10/24 0816)  potassium chloride in 0.9%NaCl, 100 mL/hr, Last Rate: 100 mL/hr (11/10/24 0250)      PRN medications  PRN medications: acetaminophen, albuterol, benzocaine-menthol, dextromethorphan-guaifenesin, guaiFENesin, heparin (porcine)  Results for orders placed or performed during the hospital encounter of 11/07/24 (from the past 24 hours)   CBC   Result Value Ref Range    WBC 16.0 (H) 4.4 - 11.3 x10*3/uL    nRBC 0.2 (H) 0.0 - 0.0 /100 WBCs    RBC 3.96 (L) 4.50 - 5.90 x10*6/uL    Hemoglobin 12.8 (L) 13.5 - 17.5 g/dL    Hematocrit 36.7 (L) 41.0 - 52.0 %    MCV 93 80 - 100 fL    MCH 32.3 26.0 - 34.0 pg    MCHC 34.9 32.0 - 36.0 g/dL    RDW 15.3 (H) 11.5 - 14.5 %    Platelets 378 150 - 450 x10*3/uL   aPTT   Result Value Ref Range    aPTT 73.2 (H) 22.0 - 32.5 seconds     *Note: Due to a large number of results and/or encounters for the requested time period, some results have not been displayed. A complete set of results can be found in Results Review.       Transthoracic Echo (TTE) Complete    Result Date: 11/8/2024           [place] [address]            Phone [phone] TRANSTHORACIC ECHOCARDIOGRAM REPORT Patient Name:       VALERIE CHAIREZ SPENSER [place] Physician:    [id number] Devon Saucedo MD Study Date:         11/8/2024            Ordering Provider:    [id number] DEVON SAUCEDO MRN/PID:            [id number]             Fellow: Accession#:         [id number]         Nurse: Date of Birth/Age:  1965 / 59 years Sonographer:          Dina Gates RDCS Gender Assigned at                      Additional Staff: Birth: Height:             182.88 cm            Admit Date: Weight:              79.83 kg             Admission Status:     Inpatient -                                                                Routine BSA / BMI:          2.02 m2 / 23.87      Department Location:  [facility]                     kg/m2 Blood Pressure: 90 /50 mmHg Study Type:    TRANSTHORACIC ECHO (TTE) COMPLETE Diagnosis/ICD: Other pulmonary embolism without acute cor pulmonale-I26.99 Indication:    PE CPT Codes:     Echo Complete w Full Doppler-[id number]  Study Detail: The following Echo studies were performed: 2D, M-Mode, Doppler and               color flow.  PHYSICIAN INTERPRETATION: Left Ventricle: The left ventricular systolic function is low normal, with a visually estimated ejection fraction of 50-55%. There are no regional wall motion abnormalities. The left ventricular cavity size is normal. There is normal septal and normal posterior left ventricular wall thickness. There is left ventricular concentric remodeling. Abnormal (paradoxical) septal motion consistent with post-operative status and the interventricular septum is flattened in systole and diastole, consistent with right ventricular pressure and volume overload. Spectral Doppler shows a normal pattern of left ventricular diastolic filling. Left Atrium: The left atrium is mildly dilated. Right Ventricle: The right ventricle is moderately enlarged. There is normal right ventriclar wall thickness. There is reduced right ventricular systolic function. Right Atrium: The right atrium is mildly dilated. Aortic Valve: There is a prosthetic aortic valve present. The aortic valve dimensionless index is 0.43. There is trace prosthetic aortic valve regurgitation. There is trace aortic valve regurgitation. The peak instantaneous gradient of the aortic valve is 17 mmHg. The mean gradient of the aortic valve is 9 mmHg. Mitral Valve: The mitral valve is normal in structure. There is normal mitral valve leaflet mobility. There is mild mitral annular calcification.  The peak instantaneous gradient of the mitral valve is 11 mmHg. There is moderate mitral valve regurgitation which is centrally directed. The mitral regurgitant orifice area is 25 mm2. Tricuspid Valve: The tricuspid valve is structurally normal. There is normal tricuspid valve leaflet mobility. There is mild to moderate tricuspid regurgitation. The Doppler estimated RVSP is mildly elevated right ventricular systolic pressure at 41.5 mmHg. Pulmonic Valve: The pulmonic valve is structurally normal. There is mild pulmonic valve regurgitation. Pericardium: No pericardial effusion noted. Aorta: The aortic root is normal. There is no dilatation of the aortic arch. There is no dilatation of the ascending aorta. There is no dilatation of the aortic root. There is plaque visualized in the ascending aorta, which is classified as a Grade 2 [mild (focal or diffuse) intimal thickening of 2-3 mm] atherosclerosis. Pulmonary Artery: The pulmonary artery is normal in size. The tricuspid regurgitant velocity is 2.81 m/s, and with an estimated right atrial pressure of 10 mmHg, the estimated pulmonary artery pressure is mildly elevated with the RVSP at 41.5 mmHg. The estimated PASP is 41 mmHg. Systemic Veins: The inferior vena cava appears moderately dilated, with IVC inspiratory collapse less than 50%.  CONCLUSIONS:  1. The left ventricular systolic function is low normal, with a visually estimated ejection fraction of 50-55%.  2. Abnormal septal motion consistent with post-operative status and right ventricular volume and pressure overload.  3. There is reduced right ventricular systolic function.  4. Moderately enlarged right ventricle.  5. Moderate mitral valve regurgitation.  6. Mild to moderate tricuspid regurgitation.  7. Mildly elevated right ventricular systolic pressure.  8. The estimated PASP is 41 mmHg.  9. The inferior vena cava appears moderately dilated, with IVC inspiratory collapse less than 50%. 10. The peak  instantaneous gradient of the aortic valve is 17 mmHg. 11. There is plaque visualized in the ascending aorta. QUANTITATIVE DATA SUMMARY:  2D MEASUREMENTS:           Normal Ranges: IVSd:            0.84 cm   (0.6-1.1cm) LVPWd:           0.86 cm   (0.6-1.1cm) LVIDd:           3.92 cm   (3.9-5.9cm) LVIDs:           3.36 cm LV Mass Index:   48.9 g/m2 LV % FS          14.3 %  LA VOLUME:                    Normal Ranges: LA Vol A4C:        100.1 ml   (22+/-6mL/m2) LA Vol A2C:        88.8 ml LA Vol BP:         97.5 ml LA Vol Index A4C:  49.6 ml/m2 LA Vol Index A2C:  44.0 ml/m2 LA Vol Index BP:   48.3 ml/m2 LA Area A4C:       26.8 cm2 LA Area A2C:       24.4 cm2 LA Major Axis A4C: 6.1 cm LA Major Axis A2C: 5.7 cm LA Volume Index:   48.3 ml/m2 LA Vol A4C:        95.2 ml LA Vol A2C:        84.9 ml LA Vol Index BSA:  44.6 ml/m2  AORTA MEASUREMENTS:         Normal Ranges: Asc Ao, d:          3.20 cm (2.1-3.4cm)  LV SYSTOLIC FUNCTION BY 2D PLANIMETRY (MOD):                      Normal Ranges: EF-Visual:      53 % LV EF Reported: 53 %  LV DIASTOLIC FUNCTION:           Normal Ranges: MV Peak E:             1.51 m/s  (0.7-1.2 m/s) MV Peak A:             0.42 m/s  (0.42-0.7 m/s) E/A Ratio:             3.59      (1.0-2.2) MV e'                  0.049 m/s (>8.0) MV lateral e'          0.05 m/s MV medial e'           0.05 m/s E/e' Ratio:            30.78     (<8.0)  MITRAL VALVE:           Normal Ranges: MV Vmax:      1.64 m/s  (<=1.3m/s) MV peak PG:   10.8 mmHg (<5mmHg) MV mean P.0 mmHg  (<48mmHg) MV VTI:       33.09 cm  (10-13cm) MV DT:        238 msec  (150-240msec)  MITRAL INSUFFICIENCY:             Normal Ranges: PISA Radius:          0.7 cm MR Vmax:              423.25 cm/s MR Alias Raffi:         33.0 cm/s MR Flow Rt:           104.54 ml/s MR EROA:              25 mm2  AORTIC VALVE:                      Normal Ranges: AoV Vmax:                2.09 m/s  (<=1.7m/s) AoV Peak P.4 mmHg (<20mmHg) AoV Mean PG:              8.9 mmHg  (1.7-11.5mmHg) LVOT Max Raffi:            0.84 m/s  (<=1.1m/s) AoV VTI:                 41.69 cm  (18-25cm) LVOT VTI:                17.77 cm AoV Dimensionless Index: 0.43  RIGHT VENTRICLE: RV s' 0.04 m/s  TRICUSPID VALVE/RVSP:          Normal Ranges: Peak TR Velocity:     2.81 m/s RV Syst Pressure:     41 mmHg  (< 30mmHg) IVC Diam:             2.83 cm  PULMONIC VALVE:         Normal Ranges: PV Accel Time:  91 msec (>120ms)  AORTA: Asc Ao Diam 3.25 cm  [id number] Devon Simmons MD Electronically signed on 11/8/2024 at 6:31:16 PM  ** Final **     NM bone whole body    Result Date: 11/8/2024  Interpreted By:  Jerry Upton and Ogievich Taessa STUDY: NM BONE WHOLE BODY;  11/8/2024 1:57 pm   INDICATION: Signs/Symptoms:rule out bone metastases.   Per EMR: Was admitted with fatigue, malaise, and productive cough. CT with pneumonia and possible lung mass.   COMPARISON: CTA chest 11/07/2020   ACCESSION NUMBER(S): [id number]   ORDERING CLINICIAN: ASPEN SEN   TECHNIQUE: DIVISION OF NUCLEAR MEDICINE BONE SCAN, WHOLE BODY plus REGIONAL VIEWS   The patient received an intravenous dose of  26.7 mCi of Tc-99m MDP. Anterior and posterior images of the skeleton from skull vertex to feet were then acquired.  Additional regional skeletal images were also obtained.   FINDINGS: No foci with decreased radiotracer uptake to suggest osseous metastatic disease.   Mild increased radiotracer uptake in the right 8th posterior rib which correlates with a healed fracture deformity on CT dated 11/07/2024.   Expected, excreted activity is noted in the kidneys and bladder.   Increased radiotracer uptake is seen in the bilateral shoulders, knees, and feet, most consistent with an arthritic pattern.       No evidence of osseous metastatic disease.   I personally reviewed the images/study and I agree with the findings as stated by Ruthann Wang DO, PGY-3. This study was interpreted at [facility]  [place], [place].   MACRO: None   Signed by: Jerry Upton 11/8/2024 4:05 PM Dictation workstation:   [id number]    ECG 12 lead    Result Date: 11/8/2024  Normal sinus rhythm Right ventricular hypertrophy Nonspecific ST and T wave abnormality Prolonged QT Abnormal ECG Confirmed by Andrea Hong ([id number]) on 11/8/2024 8:41:12 AM    Lower extremity venous duplex bilateral    Result Date: 11/7/2024  Interpreted By:  Shelby Stein, STUDY: [facility] LOWER EXTREMITY VENOUS DUPLEX BILATERAL; 11/7/2024 10:18 pm   INDICATION: Signs/Symptoms:Swelling.   COMPARISON: None.   ACCESSION NUMBER(S): [id number]   ORDERING CLINICIAN: SARA GREEN   TECHNIQUE: 2D grayscale and color-flow duplex images were obtained along with Doppler spectral waveform analysis of the deep venous system of the both lower extremities from the groin to the knee. Attempts were made to image the calf veins. Venous compression and augmentation were performed.   FINDINGS: Right Lower Extremity: There is normal compressibility and flow within the visualized vessels of the deep venous system of this lower extremity.   Left lower Extremity: There is normal compressibility and flow within the visualized vessels of the deep venous system of this lower extremity.         No sign of acute deep venous thrombosis in the lower extremities.     MACRO: none   Signed by: Shelby Stein 11/7/2024 10:56 PM Dictation workstation:   [id number]    US right upper quadrant    Result Date: 11/7/2024  Interpreted By:  Shelby Stein, STUDY: US RIGHT UPPER QUADRANT;  11/7/2024 9:20 pm   INDICATION: Signs/Symptoms:Abdominal pain.   COMPARISON: None.   ACCESSION NUMBER(S): [id number]   ORDERING CLINICIAN: SARA GREEN   TECHNIQUE: Grayscale and color Doppler imaging of the abdomen. Multiple images of the right upper quadrant were obtained.   FINDINGS: LIVER: The liver measures 19.0 cm. No intrahepatic biliary distention. No focal hepatic lesions.     GALLBLADDER: Gallbladder is  partially collapsed and demonstrates wall thickening measuring 5 mm. This is in part due to incomplete distention of the gallbladder. There is no sign of gallstones or pericholecystic fluid. Sonographic Chen's sign is negative.     BILE DUCTS: No evidence of intra or extrahepatic biliary dilatation is identified; the common bile duct measures .   PANCREAS: Pancreas is completely obscured by bowel gas   RIGHT KIDNEY: The right kidney measures 14.3cm in length. In the mid right renal cortex there is a 3.5 cm simple cyst. The renal cortical echogenicity and thickness are within normal limit.  No hydronephrosis or renal calculi are seen.       1. There is gallbladder wall thickening that may be due to incomplete distention of the gallbladder 2. Mild hepatomegaly 3. No biliary distention   MACRO: None   Signed by: Shelby Stein 11/7/2024 10:38 PM Dictation workstation:   [id number]    CT abdomen pelvis w IV contrast    Result Date: 11/7/2024  Interpreted By:  Sushil Isabel, STUDY: CT ABDOMEN PELVIS W IV CONTRAST;  11/7/2024 6:16 pm   INDICATION: Signs/Symptoms:elevated bilirubin, shortness of breath.     COMPARISON: MRI liver 01/09/2024.   ACCESSION NUMBER(S): [id number]   ORDERING CLINICIAN: LAURA CHAVARRIA   TECHNIQUE: Contiguous axial images of the abdomen and pelvis were obtained after the intravenous administration of 75 mL Omnipaque 350 contrast. Coronal and sagittal reformatted images were reconstructed from the axial data.   FINDINGS: LOWER CHEST: Extensive consolidation within the visualized right lung base. Areas of fluid attenuation posterior inferiorly in the right lung base and swell as laterally suspicious for empyema. Additional considerations for the lateral focus would include pulmonary abscess/necrosis or necrotic lesion.   LIVER: Borderline hepatomegaly measuring 18.1 cm in length.   BILE DUCTS: No significant intrahepatic or extrahepatic dilatation.   GALLBLADDER: Mild gallbladder wall  thickening in the setting of contraction. No radiopaque stone identified.   PANCREAS: No significant abnormality.   SPLEEN: Surgically absent.   ADRENALS: No significant abnormality.   KIDNEYS, URETERS, BLADDER: 3.7 cm simple fluid attenuating right renal cyst. No hydronephrosis or hydroureter. No significant bladder abnormality.   REPRODUCTIVE ORGANS: No significant abnormality.   GI: No obstruction. Mild wall thickening of the hepatic flexure and mild fluid and wall thickening adjacent to the splenic flexure. No obstruction. The appendix is not visualized.   VESSELS: No aortic aneurysm. Moderate to heavy aortic calcifications. The portal veins and IVC are patent.   PERITONEUM/RETROPERITONEUM: Trace perihepatic, right pericolic gutter, and pelvic ascites.   LYMPH NODES: No enlarged lymph nodes.   ABDOMINAL WALL: Anasarca.   OSSEOUS STRUCTURES: No acute osseous abnormality.       1. Borderline hepatomegaly and trace perihepatic ascites. Correlate with LFTs. 2. Mild gallbladder wall thickening in the setting of underdistention. Correlate for clinical evidence of cholecystitis. Ultrasound may be performed in further assessment. 3. Mild wall thickening of the hepatic flexure and mild wall thickening and small amount of fluid of the splenic flexure. Findings may be related to underdistention or focal colitis. 4. Splenectomy. 5. Right basilar consolidations. Small effusion or empyema in the dependent right lung base. Ovoid consolidative focus partially visualized on this exam with internal peripherally enhancing fluid which could reflect an area of empyema, pulmonary abscess, or pulmonary necrosis/necrotic lesion.   MACRO: None   Signed by: Sushil Isabel 11/7/2024 6:55 PM Dictation workstation:   [id number]    CT angio chest for pulmonary embolism    Result Date: 11/7/2024  Interpreted By:  Sushil Isabel, STUDY: CT ANGIO CHEST FOR PULMONARY EMBOLISM;  11/7/2024 6:16 pm   INDICATION: Signs/Symptoms:elevated troponin,  shortness of breath.     COMPARISON: CTA chest for PE 10/07/2024   ACCESSION NUMBER(S): [id number]   ORDERING CLINICIAN: LAURA CHAVARRIA   TECHNIQUE: Contiguous axial images of the chest were obtained after the intravenous administration of 75 mL Omnipaque 350 contrast using angiographic PE protocol. Coronal and sagittal reformatted images were reconstructed from the axial data. MIP images were created on an independent workstation and reviewed.   FINDINGS: PULMONARY ARTERIES: Adequate opacification to the level of the segmental arteries. Ill-defined areas of low-attenuation within the distal right main pulmonary artery as well as throughout the proximal segmental arteries of the right middle and lower lobes. While some of this may be related to mixing artifact, the overall appearance is concerning for pulmonary embolus. This is particularly true within the distal right main pulmonary artery (series 5, image 135 segmental arteries of the right middle lobe (series 5, image 150). The main pulmonary artery is normal in diameter. Right ventricular to left ventricular ratio of roughly 1.5 concerning for right heart strain.   HEART: Heart is enlarged. Triple-vessel coronary vascular calcifications. Calcifications in the plane of the aortic and mitral valves. No significant pericardial effusion.   VESSELS: No aortic aneurysm. Assessment for aortic patency limited by phase of contrast enhancement. Moderate calcifications of the thoracic aorta.   MEDIASTINUM AND LYMPH NODES: Multiple nodules within the thyroid, greatest on the right. This may be further assessed with outpatient ultrasound. Prominent mediastinal lymph nodes including a 1.1 cm right paratracheal node and 1.4 cm subcarinal node. No pneumomediastinum. The esophagus appears within normal limits.   LUNG, AIRWAYS, AND PLEURA: The trachea and proximal mainstem bronchi are patent. Extensive, progressive consolidations are noted throughout the right lung, most  pronounced within the perihilar region, right middle lobe, and right lower lobe. There appears to be narrowing and effacement of the right lower lobe bronchi. Multiple air bronchograms are noted within the right lower lobe consolidation. Loculated right lower lobe effusion measuring up to 7.9 cm with surrounding wall thickening suggesting possible empyema. Additionally, there is a 10.2 cm ovoid consolidative focus laterally at the right lung base (series 5 image 193). Which appears to demonstrate areas of complex fluid density. An additional loculated effusion or possibly pulmonary abscess not excluded.   OSSEOUS STRUCTURES: No acute osseous abnormality. Median sternotomy changes.   CHEST WALL SOFT TISSUES: No discernible abnormality.   UPPER ABDOMEN/OTHER: Please see separately dictated CT of the abdomen and pelvis.       1. Low attenuating regions concerning for pulmonary emboli within the distal right main pulmonary artery and segmental arteries of the right middle and lower lobes. This may in part be related to/accentuated by mixing artifact. 2. CT findings concerning for right heart strain. Consider echocardiographic correlation. 3. Extensive, progressive consolidative opacities involving the right perihilar region, right middle lobe, and right lower lobe. While this may reflect pneumonia, superimposed malignancy is not excluded. Pulmonary consultation is recommended. 4. Small loculated pleural effusion dependently in the right lung base suspicious for empyema. 5. An ovoid consolidative focus laterally in the right lower/middle lobes appears to demonstrate areas of internal complex fluid density. Considerations include loculated effusion, region of pneumonia or atelectasis with possible pulmonary abscess/necrosis, or a necrotic lesion. 6. Cardiomegaly.   MACRO: Sushil Isabel discussed the significance and urgency of this critical finding by telephone with  LAURA CHAVARRIA on 11/7/2024 at 6:32 pm.   (**-RCF-**) Findings:  See findings.   Signed by: Sushil Isabel 11/7/2024 6:37 PM Dictation workstation:   [id number]    XR chest 2 views    Result Date: 11/7/2024  Interpreted By:  Adela Ferreira, STUDY: XR CHEST 2 VIEWS;  11/7/2024 4:17 pm   INDICATION: Signs/Symptoms:sob.     COMPARISON: 11/04/2024   ACCESSION NUMBER(S): [id number]   ORDERING CLINICIAN: VICTORINA VALADEZ   FINDINGS:         CARDIOMEDIASTINAL SILHOUETTE: Sternotomy wires are unchanged, including fracture of the superior most wire. The right cardiac border is obscured. Left cardiac border is unchanged with no evidence of cardiomegaly. Aorta is atherosclerotic.   LUNGS: There is a additional volume loss in the right lower lobe with air bronchograms noted. There is also volume loss in the right middle lobe. Right pleural effusion appears similar to the prior study. Left lung is clear.   ABDOMEN: No remarkable upper abdominal findings.   BONES: No acute osseous changes.       1.  Unchanged small right pleural effusion but progression of volume loss right middle and lower lobes.       MACRO: None   Signed by: Adela Ferreira 11/7/2024 4:40 PM Dictation workstation:   [id number]    XR chest 2 views    Result Date: 11/7/2024  Interpreted By:  Lisa Mandel, STUDY: XR CHEST 2 VIEWS;  11/4/2024 4:48 pm   INDICATION: Signs/Symptoms:cough, pneumonia.   ,J18.9 Pneumonia, unspecified organism   COMPARISON: 10/07/2024   ACCESSION NUMBER(S): [id number]   ORDERING CLINICIAN: DAMARIS DIAS   FINDINGS: Right perihilar infiltrate again seen. Pleural density laterally slightly increased since the previous exam. The left lung is clear. Cardiac silhouette is likely mildly enlarged status post sternotomy. Stable discontinuity of the superior most sternal wire. Left upper quadrant surgical clips again seen.       Persistent right perihilar infiltrate with increased pleural effusion since 10/07/2024. Follow-up to ensure resolution after appropriate treatment  recommended.   MACRO: None.   Signed by: Lisa Mandel 11/7/2024 12:12 PM Dictation workstation:   [id number]                        Assessment/Plan   Assessment & Plan  Acute pulmonary embolism with acute cor pulmonale (Multi)  On heparin gtt needs to switch to Eliquis upon discharge   Lung mass  Need follow up CT chest in 3 months   Pulmonary hypertension (Multi)  Likely type 2&3 follow up echo in 6 months  Empyema, right (Multi)  Plan to drain loculated effusion by IR will be arranged tomorrow  Pneumonia of right lower lobe due to infectious organism  On zosyn   MRSA screen is negative   Plan for bronchoscopy on Monday   Pleural effusion on right  Plan for thoracentesis tomorrow.  Possible  Pulmonary abscess (Multi)  On zosyn cultures are negative to date   Prediabetes    Overall his condition slightly worse will order chest x-ray to monitor the progress of his underlying lung disease.  Spoke to the wife at bedside at length      I spent 25 minutes in the professional and overall care of this patient.      Tarek R Gharibeh, MD

## 2024-11-11 ENCOUNTER — APPOINTMENT (OUTPATIENT)
Dept: GASTROENTEROLOGY | Facility: HOSPITAL | Age: 59
DRG: 003 | End: 2024-11-11
Payer: MEDICARE

## 2024-11-11 ENCOUNTER — APPOINTMENT (OUTPATIENT)
Dept: CARDIOLOGY | Facility: HOSPITAL | Age: 59
DRG: 003 | End: 2024-11-11
Payer: MEDICARE

## 2024-11-11 ENCOUNTER — APPOINTMENT (OUTPATIENT)
Dept: NEUROLOGY | Facility: HOSPITAL | Age: 59
DRG: 003 | End: 2024-11-11
Payer: MEDICARE

## 2024-11-11 ENCOUNTER — APPOINTMENT (OUTPATIENT)
Dept: RADIOLOGY | Facility: HOSPITAL | Age: 59
DRG: 003 | End: 2024-11-11
Payer: MEDICARE

## 2024-11-11 LAB
ABO GROUP (TYPE) IN BLOOD: NORMAL
ABO GROUP (TYPE) IN BLOOD: NORMAL
ALBUMIN SERPL BCP-MCNC: 2.1 G/DL (ref 3.4–5)
ALBUMIN SERPL BCP-MCNC: 2.1 G/DL (ref 3.4–5)
ALBUMIN SERPL BCP-MCNC: 2.2 G/DL (ref 3.4–5)
ALBUMIN SERPL BCP-MCNC: 2.3 G/DL (ref 3.4–5)
ALBUMIN SERPL BCP-MCNC: 2.3 G/DL (ref 3.4–5)
ALBUMIN SERPL BCP-MCNC: 2.4 G/DL (ref 3.4–5)
ALP SERPL-CCNC: 180 U/L (ref 33–120)
ALP SERPL-CCNC: 211 U/L (ref 33–120)
ALT SERPL W P-5'-P-CCNC: 727 U/L (ref 10–52)
ALT SERPL W P-5'-P-CCNC: 741 U/L (ref 10–52)
ANION GAP BLDA CALCULATED.4IONS-SCNC: 10 MMO/L (ref 10–25)
ANION GAP BLDA CALCULATED.4IONS-SCNC: 12 MMO/L (ref 10–25)
ANION GAP BLDA CALCULATED.4IONS-SCNC: 14 MMO/L (ref 10–25)
ANION GAP BLDA CALCULATED.4IONS-SCNC: 17 MMO/L (ref 10–25)
ANION GAP BLDA CALCULATED.4IONS-SCNC: 21 MMO/L (ref 10–25)
ANION GAP BLDA CALCULATED.4IONS-SCNC: 24 MMO/L (ref 10–25)
ANION GAP BLDA CALCULATED.4IONS-SCNC: 27 MMO/L (ref 10–25)
ANION GAP BLDA CALCULATED.4IONS-SCNC: 8 MMO/L (ref 10–25)
ANION GAP SERPL CALC-SCNC: 18 MMOL/L (ref 10–20)
ANION GAP SERPL CALC-SCNC: 21 MMOL/L (ref 10–20)
ANION GAP SERPL CALC-SCNC: 27 MMOL/L (ref 10–20)
ANION GAP SERPL CALC-SCNC: 39 MMOL/L (ref 10–20)
ANTIBODY SCREEN: NORMAL
ANTIBODY SCREEN: NORMAL
APTT PPP: 103 SECONDS (ref 27–38)
APTT PPP: >200 SECONDS (ref 27–38)
AST SERPL W P-5'-P-CCNC: 2238 U/L (ref 9–39)
AST SERPL W P-5'-P-CCNC: 2398 U/L (ref 9–39)
BACTERIA SPEC RESP CULT: NO GROWTH
BASE EXCESS BLDA CALC-SCNC: -0.1 MMOL/L (ref -2–3)
BASE EXCESS BLDA CALC-SCNC: -12.1 MMOL/L (ref -2–3)
BASE EXCESS BLDA CALC-SCNC: -3.7 MMOL/L (ref -2–3)
BASE EXCESS BLDA CALC-SCNC: -4.1 MMOL/L (ref -2–3)
BASE EXCESS BLDA CALC-SCNC: -9.2 MMOL/L (ref -2–3)
BASE EXCESS BLDA CALC-SCNC: 3.9 MMOL/L (ref -2–3)
BASE EXCESS BLDA CALC-SCNC: 6.1 MMOL/L (ref -2–3)
BASE EXCESS BLDA CALC-SCNC: 8.2 MMOL/L (ref -2–3)
BASE EXCESS BLDV CALC-SCNC: 4.3 MMOL/L
BASOPHILS # BLD MANUAL: 0 X10*3/UL (ref 0–0.1)
BASOPHILS NFR BLD MANUAL: 0 %
BILIRUB DIRECT SERPL-MCNC: 2 MG/DL (ref 0–0.3)
BILIRUB DIRECT SERPL-MCNC: 2.1 MG/DL (ref 0–0.3)
BILIRUB SERPL-MCNC: 3 MG/DL (ref 0–1.2)
BILIRUB SERPL-MCNC: 3.1 MG/DL (ref 0–1.2)
BODY TEMPERATURE: 37 DEGREES CELSIUS
BUN SERPL-MCNC: 25 MG/DL (ref 6–23)
BUN SERPL-MCNC: 26 MG/DL (ref 6–23)
BUN SERPL-MCNC: 27 MG/DL (ref 6–23)
BUN SERPL-MCNC: 28 MG/DL (ref 6–23)
BURR CELLS BLD QL SMEAR: ABNORMAL
CA-I BLD-SCNC: 1.03 MMOL/L (ref 1.1–1.33)
CA-I BLD-SCNC: 1.07 MMOL/L (ref 1.1–1.33)
CA-I BLD-SCNC: 1.09 MMOL/L (ref 1.1–1.33)
CA-I BLD-SCNC: 1.09 MMOL/L (ref 1.1–1.33)
CA-I BLDA-SCNC: 1.07 MMOL/L (ref 1.1–1.33)
CA-I BLDA-SCNC: 1.09 MMOL/L (ref 1.1–1.33)
CA-I BLDA-SCNC: 1.09 MMOL/L (ref 1.1–1.33)
CA-I BLDA-SCNC: 1.11 MMOL/L (ref 1.1–1.33)
CA-I BLDA-SCNC: 1.13 MMOL/L (ref 1.1–1.33)
CA-I BLDA-SCNC: 1.14 MMOL/L (ref 1.1–1.33)
CALCIUM SERPL-MCNC: 8.3 MG/DL (ref 8.6–10.6)
CALCIUM SERPL-MCNC: 8.4 MG/DL (ref 8.6–10.6)
CALCIUM SERPL-MCNC: 8.5 MG/DL (ref 8.6–10.6)
CALCIUM SERPL-MCNC: 9.1 MG/DL (ref 8.6–10.6)
CHLORIDE BLDA-SCNC: 100 MMOL/L (ref 98–107)
CHLORIDE BLDA-SCNC: 100 MMOL/L (ref 98–107)
CHLORIDE BLDA-SCNC: 95 MMOL/L (ref 98–107)
CHLORIDE BLDA-SCNC: 96 MMOL/L (ref 98–107)
CHLORIDE BLDA-SCNC: 98 MMOL/L (ref 98–107)
CHLORIDE BLDA-SCNC: 99 MMOL/L (ref 98–107)
CHLORIDE SERPL-SCNC: 92 MMOL/L (ref 98–107)
CHLORIDE SERPL-SCNC: 93 MMOL/L (ref 98–107)
CHLORIDE SERPL-SCNC: 93 MMOL/L (ref 98–107)
CHLORIDE SERPL-SCNC: 95 MMOL/L (ref 98–107)
CO2 SERPL-SCNC: 15 MMOL/L (ref 21–32)
CO2 SERPL-SCNC: 24 MMOL/L (ref 21–32)
CO2 SERPL-SCNC: 28 MMOL/L (ref 21–32)
CO2 SERPL-SCNC: 32 MMOL/L (ref 21–32)
CREAT SERPL-MCNC: 1.98 MG/DL (ref 0.5–1.3)
CREAT SERPL-MCNC: 2.08 MG/DL (ref 0.5–1.3)
CREAT SERPL-MCNC: 2.11 MG/DL (ref 0.5–1.3)
CREAT SERPL-MCNC: 2.29 MG/DL (ref 0.5–1.3)
EGFRCR SERPLBLD CKD-EPI 2021: 32 ML/MIN/1.73M*2
EGFRCR SERPLBLD CKD-EPI 2021: 35 ML/MIN/1.73M*2
EGFRCR SERPLBLD CKD-EPI 2021: 36 ML/MIN/1.73M*2
EGFRCR SERPLBLD CKD-EPI 2021: 38 ML/MIN/1.73M*2
EJECTION FRACTION APICAL 4 CHAMBER: 18.9
EJECTION FRACTION: 13 %
EOSINOPHIL # BLD MANUAL: 0 X10*3/UL (ref 0–0.7)
EOSINOPHIL NFR BLD MANUAL: 0 %
ERYTHROCYTE [DISTWIDTH] IN BLOOD BY AUTOMATED COUNT: 15.9 % (ref 11.5–14.5)
ERYTHROCYTE [DISTWIDTH] IN BLOOD BY AUTOMATED COUNT: 15.9 % (ref 11.5–14.5)
ERYTHROCYTE [DISTWIDTH] IN BLOOD BY AUTOMATED COUNT: 16 % (ref 11.5–14.5)
ERYTHROCYTE [DISTWIDTH] IN BLOOD BY AUTOMATED COUNT: 16.2 % (ref 11.5–14.5)
ERYTHROCYTE [DISTWIDTH] IN BLOOD BY AUTOMATED COUNT: 16.2 % (ref 11.5–14.5)
FIBRINOGEN PPP-MCNC: 301 MG/DL (ref 200–400)
GLUCOSE BLD MANUAL STRIP-MCNC: 116 MG/DL (ref 74–99)
GLUCOSE BLD MANUAL STRIP-MCNC: 122 MG/DL (ref 74–99)
GLUCOSE BLD MANUAL STRIP-MCNC: 124 MG/DL (ref 74–99)
GLUCOSE BLD MANUAL STRIP-MCNC: 133 MG/DL (ref 74–99)
GLUCOSE BLD MANUAL STRIP-MCNC: 142 MG/DL (ref 74–99)
GLUCOSE BLD MANUAL STRIP-MCNC: 143 MG/DL (ref 74–99)
GLUCOSE BLD MANUAL STRIP-MCNC: 154 MG/DL (ref 74–99)
GLUCOSE BLD MANUAL STRIP-MCNC: 172 MG/DL (ref 74–99)
GLUCOSE BLD MANUAL STRIP-MCNC: 201 MG/DL (ref 74–99)
GLUCOSE BLD MANUAL STRIP-MCNC: 225 MG/DL (ref 74–99)
GLUCOSE BLD MANUAL STRIP-MCNC: 226 MG/DL (ref 74–99)
GLUCOSE BLD MANUAL STRIP-MCNC: 233 MG/DL (ref 74–99)
GLUCOSE BLD MANUAL STRIP-MCNC: 240 MG/DL (ref 74–99)
GLUCOSE BLD MANUAL STRIP-MCNC: 256 MG/DL (ref 74–99)
GLUCOSE BLDA-MCNC: 142 MG/DL (ref 74–99)
GLUCOSE BLDA-MCNC: 156 MG/DL (ref 74–99)
GLUCOSE BLDA-MCNC: 200 MG/DL (ref 74–99)
GLUCOSE BLDA-MCNC: 240 MG/DL (ref 74–99)
GLUCOSE BLDA-MCNC: 259 MG/DL (ref 74–99)
GLUCOSE BLDA-MCNC: 266 MG/DL (ref 74–99)
GLUCOSE BLDA-MCNC: 277 MG/DL (ref 74–99)
GLUCOSE BLDA-MCNC: 308 MG/DL (ref 74–99)
GLUCOSE SERPL-MCNC: 197 MG/DL (ref 74–99)
GLUCOSE SERPL-MCNC: 201 MG/DL (ref 74–99)
GLUCOSE SERPL-MCNC: 251 MG/DL (ref 74–99)
GLUCOSE SERPL-MCNC: 273 MG/DL (ref 74–99)
GRAM STN SPEC: ABNORMAL
GRAM STN SPEC: ABNORMAL
HCO3 BLDA-SCNC: 14 MMOL/L (ref 22–26)
HCO3 BLDA-SCNC: 17.5 MMOL/L (ref 22–26)
HCO3 BLDA-SCNC: 18.9 MMOL/L (ref 22–26)
HCO3 BLDA-SCNC: 25.4 MMOL/L (ref 22–26)
HCO3 BLDA-SCNC: 25.8 MMOL/L (ref 22–26)
HCO3 BLDA-SCNC: 27.6 MMOL/L (ref 22–26)
HCO3 BLDA-SCNC: 29.5 MMOL/L (ref 22–26)
HCO3 BLDA-SCNC: 31.9 MMOL/L (ref 22–26)
HCO3 BLDV-SCNC: 26.9 MMOL/L
HCT VFR BLD AUTO: 29.2 % (ref 41–52)
HCT VFR BLD AUTO: 29.5 % (ref 41–52)
HCT VFR BLD AUTO: 31.6 % (ref 41–52)
HCT VFR BLD AUTO: 31.6 % (ref 41–52)
HCT VFR BLD AUTO: 35.3 % (ref 41–52)
HCT VFR BLD EST: 29 % (ref 41–52)
HCT VFR BLD EST: 30 % (ref 41–52)
HCT VFR BLD EST: 30 % (ref 41–52)
HCT VFR BLD EST: 31 % (ref 41–52)
HCT VFR BLD EST: 32 % (ref 41–52)
HCT VFR BLD EST: 33 % (ref 41–52)
HCT VFR BLD EST: 33 % (ref 41–52)
HCT VFR BLD EST: 35 % (ref 41–52)
HGB BLD-MCNC: 10.1 G/DL (ref 13.5–17.5)
HGB BLD-MCNC: 10.6 G/DL (ref 13.5–17.5)
HGB BLD-MCNC: 10.6 G/DL (ref 13.5–17.5)
HGB BLD-MCNC: 11.8 G/DL (ref 13.5–17.5)
HGB BLD-MCNC: 9.8 G/DL (ref 13.5–17.5)
HGB BLDA-MCNC: 10.4 G/DL (ref 13.5–17.5)
HGB BLDA-MCNC: 10.5 G/DL (ref 13.5–17.5)
HGB BLDA-MCNC: 10.9 G/DL (ref 13.5–17.5)
HGB BLDA-MCNC: 10.9 G/DL (ref 13.5–17.5)
HGB BLDA-MCNC: 11.6 G/DL (ref 13.5–17.5)
HGB BLDA-MCNC: 9.6 G/DL (ref 13.5–17.5)
HGB BLDA-MCNC: 9.9 G/DL (ref 13.5–17.5)
HGB BLDA-MCNC: 9.9 G/DL (ref 13.5–17.5)
IMM GRANULOCYTES # BLD AUTO: 2.55 X10*3/UL (ref 0–0.7)
IMM GRANULOCYTES NFR BLD AUTO: 4.1 % (ref 0–0.9)
INHALED O2 CONCENTRATION: 100 %
INHALED O2 CONCENTRATION: 50 %
INHALED O2 CONCENTRATION: 50 %
INHALED O2 CONCENTRATION: 80 %
INR PPP: 2.3 (ref 0.9–1.1)
INR PPP: 2.5 (ref 0.9–1.1)
LACTATE BLDA-SCNC: 10.7 MMOL/L (ref 0.4–2)
LACTATE BLDA-SCNC: 10.9 MMOL/L (ref 0.4–2)
LACTATE BLDA-SCNC: 15 MMOL/L (ref 0.4–2)
LACTATE BLDA-SCNC: 17 MMOL/L (ref 0.4–2)
LACTATE BLDA-SCNC: 4.5 MMOL/L (ref 0.4–2)
LACTATE BLDA-SCNC: 5.4 MMOL/L (ref 0.4–2)
LACTATE BLDA-SCNC: 6.7 MMOL/L (ref 0.4–2)
LACTATE BLDA-SCNC: >17 MMOL/L (ref 0.4–2)
LDH SERPL L TO P-CCNC: 3982 U/L (ref 84–246)
LDH SERPL L TO P-CCNC: 5710 U/L (ref 84–246)
LEFT VENTRICLE INTERNAL DIMENSION DIASTOLE: 4.9 CM (ref 3.5–6)
LEFT VENTRICULAR OUTFLOW TRACT DIAMETER: 1.8 CM
LYMPHOCYTES # BLD MANUAL: 0.57 X10*3/UL (ref 1.2–4.8)
LYMPHOCYTES NFR BLD MANUAL: 0.9 %
MAGNESIUM SERPL-MCNC: 2.03 MG/DL (ref 1.6–2.4)
MAGNESIUM SERPL-MCNC: 2.16 MG/DL (ref 1.6–2.4)
MAGNESIUM SERPL-MCNC: 2.21 MG/DL (ref 1.6–2.4)
MAGNESIUM SERPL-MCNC: 2.49 MG/DL (ref 1.6–2.4)
MCH RBC QN AUTO: 32.2 PG (ref 26–34)
MCH RBC QN AUTO: 32.2 PG (ref 26–34)
MCH RBC QN AUTO: 32.4 PG (ref 26–34)
MCH RBC QN AUTO: 32.8 PG (ref 26–34)
MCH RBC QN AUTO: 32.8 PG (ref 26–34)
MCHC RBC AUTO-ENTMCNC: 33.4 G/DL (ref 32–36)
MCHC RBC AUTO-ENTMCNC: 33.5 G/DL (ref 32–36)
MCHC RBC AUTO-ENTMCNC: 33.5 G/DL (ref 32–36)
MCHC RBC AUTO-ENTMCNC: 33.6 G/DL (ref 32–36)
MCHC RBC AUTO-ENTMCNC: 34.2 G/DL (ref 32–36)
MCV RBC AUTO: 95 FL (ref 80–100)
MCV RBC AUTO: 96 FL (ref 80–100)
MCV RBC AUTO: 96 FL (ref 80–100)
MCV RBC AUTO: 98 FL (ref 80–100)
MCV RBC AUTO: 98 FL (ref 80–100)
MONOCYTES # BLD MANUAL: 2.22 X10*3/UL (ref 0.1–1)
MONOCYTES NFR BLD MANUAL: 3.5 %
MRSA DNA SPEC QL NAA+PROBE: DETECTED
NEUTS SEG # BLD MANUAL: 60.61 X10*3/UL (ref 1.2–7)
NEUTS SEG NFR BLD MANUAL: 95.6 %
NRBC BLD-RTO: 0.2 /100 WBCS (ref 0–0)
NRBC BLD-RTO: 0.2 /100 WBCS (ref 0–0)
NRBC BLD-RTO: 0.3 /100 WBCS (ref 0–0)
NRBC BLD-RTO: 0.3 /100 WBCS (ref 0–0)
NRBC BLD-RTO: 0.4 /100 WBCS (ref 0–0)
OXYHGB MFR BLDA: 89.4 % (ref 94–98)
OXYHGB MFR BLDA: 91.7 % (ref 94–98)
OXYHGB MFR BLDA: 97.9 % (ref 94–98)
OXYHGB MFR BLDA: 97.9 % (ref 94–98)
OXYHGB MFR BLDA: 98.4 % (ref 94–98)
OXYHGB MFR BLDA: 98.5 % (ref 94–98)
OXYHGB MFR BLDA: 98.9 % (ref 94–98)
OXYHGB MFR BLDA: 99 % (ref 94–98)
OXYHGB MFR BLDV: 90.3 %
PCO2 BLDA: 26 MM HG (ref 38–42)
PCO2 BLDA: 32 MM HG (ref 38–42)
PCO2 BLDA: 37 MM HG (ref 38–42)
PCO2 BLDA: 37 MM HG (ref 38–42)
PCO2 BLDA: 40 MM HG (ref 38–42)
PCO2 BLDA: 40 MM HG (ref 38–42)
PCO2 BLDA: 44 MM HG (ref 38–42)
PCO2 BLDA: 74 MM HG (ref 38–42)
PCO2 BLDV: 33 MM HG
PH BLDA: 7.15 PH (ref 7.38–7.42)
PH BLDA: 7.25 PH (ref 7.38–7.42)
PH BLDA: 7.25 PH (ref 7.38–7.42)
PH BLDA: 7.37 PH (ref 7.38–7.42)
PH BLDA: 7.47 PH (ref 7.38–7.42)
PH BLDA: 7.48 PH (ref 7.38–7.42)
PH BLDA: 7.51 PH (ref 7.38–7.42)
PH BLDA: 7.51 PH (ref 7.38–7.42)
PH BLDV: 7.52 PH
PHOSPHATE SERPL-MCNC: 2 MG/DL (ref 2.5–4.9)
PHOSPHATE SERPL-MCNC: 3.9 MG/DL (ref 2.5–4.9)
PHOSPHATE SERPL-MCNC: 4.2 MG/DL (ref 2.5–4.9)
PHOSPHATE SERPL-MCNC: 6.4 MG/DL (ref 2.5–4.9)
PLATELET # BLD AUTO: 248 X10*3/UL (ref 150–450)
PLATELET # BLD AUTO: 250 X10*3/UL (ref 150–450)
PLATELET # BLD AUTO: 253 X10*3/UL (ref 150–450)
PLATELET # BLD AUTO: 253 X10*3/UL (ref 150–450)
PLATELET # BLD AUTO: 259 X10*3/UL (ref 150–450)
PO2 BLDA: 104 MM HG (ref 85–95)
PO2 BLDA: 107 MM HG (ref 85–95)
PO2 BLDA: 277 MM HG (ref 85–95)
PO2 BLDA: 282 MM HG (ref 85–95)
PO2 BLDA: 440 MM HG (ref 85–95)
PO2 BLDA: 468 MM HG (ref 85–95)
PO2 BLDA: 56 MM HG (ref 85–95)
PO2 BLDA: 61 MM HG (ref 85–95)
PO2 BLDV: 56 MM HG
POTASSIUM BLDA-SCNC: 3.1 MMOL/L (ref 3.5–5.3)
POTASSIUM BLDA-SCNC: 3.2 MMOL/L (ref 3.5–5.3)
POTASSIUM BLDA-SCNC: 3.3 MMOL/L (ref 3.5–5.3)
POTASSIUM BLDA-SCNC: 3.4 MMOL/L (ref 3.5–5.3)
POTASSIUM BLDA-SCNC: 3.6 MMOL/L (ref 3.5–5.3)
POTASSIUM BLDA-SCNC: 3.8 MMOL/L (ref 3.5–5.3)
POTASSIUM BLDA-SCNC: 4 MMOL/L (ref 3.5–5.3)
POTASSIUM BLDA-SCNC: 4.1 MMOL/L (ref 3.5–5.3)
POTASSIUM SERPL-SCNC: 2.9 MMOL/L (ref 3.5–5.3)
POTASSIUM SERPL-SCNC: 3.1 MMOL/L (ref 3.5–5.3)
POTASSIUM SERPL-SCNC: 3.3 MMOL/L (ref 3.5–5.3)
POTASSIUM SERPL-SCNC: 3.6 MMOL/L (ref 3.5–5.3)
PROCALCITONIN SERPL-MCNC: 0.44 NG/ML
PROT SERPL-MCNC: 4.7 G/DL (ref 6.4–8.2)
PROT SERPL-MCNC: 4.9 G/DL (ref 6.4–8.2)
PROTHROMBIN TIME: 26.7 SECONDS (ref 9.8–12.8)
PROTHROMBIN TIME: 28.7 SECONDS (ref 9.8–12.8)
RBC # BLD AUTO: 3.04 X10*6/UL (ref 4.5–5.9)
RBC # BLD AUTO: 3.12 X10*6/UL (ref 4.5–5.9)
RBC # BLD AUTO: 3.23 X10*6/UL (ref 4.5–5.9)
RBC # BLD AUTO: 3.23 X10*6/UL (ref 4.5–5.9)
RBC # BLD AUTO: 3.66 X10*6/UL (ref 4.5–5.9)
RBC MORPH BLD: ABNORMAL
RH FACTOR (ANTIGEN D): NORMAL
RH FACTOR (ANTIGEN D): NORMAL
RIGHT VENTRICLE PEAK SYSTOLIC PRESSURE: 36.2 MMHG
SAO2 % BLDA: 100 % (ref 94–100)
SAO2 % BLDA: 92 % (ref 94–100)
SAO2 % BLDA: 94 % (ref 94–100)
SAO2 % BLDA: 99 % (ref 94–100)
SAO2 % BLDV: 92 %
SCHISTOCYTES BLD QL SMEAR: ABNORMAL
SODIUM BLDA-SCNC: 133 MMOL/L (ref 136–145)
SODIUM BLDA-SCNC: 134 MMOL/L (ref 136–145)
SODIUM BLDA-SCNC: 135 MMOL/L (ref 136–145)
SODIUM BLDA-SCNC: 135 MMOL/L (ref 136–145)
SODIUM BLDA-SCNC: 136 MMOL/L (ref 136–145)
SODIUM SERPL-SCNC: 138 MMOL/L (ref 136–145)
SODIUM SERPL-SCNC: 141 MMOL/L (ref 136–145)
SODIUM SERPL-SCNC: 142 MMOL/L (ref 136–145)
SODIUM SERPL-SCNC: 143 MMOL/L (ref 136–145)
TOTAL CELLS COUNTED BLD: 115
TRICUSPID ANNULAR PLANE SYSTOLIC EXCURSION: 0.8 CM
UFH PPP CHRO-ACNC: 0.2 IU/ML
UFH PPP CHRO-ACNC: 0.2 IU/ML
UFH PPP CHRO-ACNC: 0.4 IU/ML
UFH PPP CHRO-ACNC: 0.4 IU/ML
WBC # BLD AUTO: 53.3 X10*3/UL (ref 4.4–11.3)
WBC # BLD AUTO: 58.2 X10*3/UL (ref 4.4–11.3)
WBC # BLD AUTO: 60.8 X10*3/UL (ref 4.4–11.3)
WBC # BLD AUTO: 63.4 X10*3/UL (ref 4.4–11.3)
WBC # BLD AUTO: 63.4 X10*3/UL (ref 4.4–11.3)

## 2024-11-11 PROCEDURE — 71045 X-RAY EXAM CHEST 1 VIEW: CPT | Performed by: RADIOLOGY

## 2024-11-11 PROCEDURE — 83735 ASSAY OF MAGNESIUM: CPT | Performed by: STUDENT IN AN ORGANIZED HEALTH CARE EDUCATION/TRAINING PROGRAM

## 2024-11-11 PROCEDURE — 85027 COMPLETE CBC AUTOMATED: CPT | Performed by: STUDENT IN AN ORGANIZED HEALTH CARE EDUCATION/TRAINING PROGRAM

## 2024-11-11 PROCEDURE — 94003 VENT MGMT INPAT SUBQ DAY: CPT

## 2024-11-11 PROCEDURE — 85520 HEPARIN ASSAY: CPT | Performed by: STUDENT IN AN ORGANIZED HEALTH CARE EDUCATION/TRAINING PROGRAM

## 2024-11-11 PROCEDURE — 82805 BLOOD GASES W/O2 SATURATION: CPT | Performed by: NURSE PRACTITIONER

## 2024-11-11 PROCEDURE — 99291 CRITICAL CARE FIRST HOUR: CPT | Performed by: ANESTHESIOLOGY

## 2024-11-11 PROCEDURE — 84132 ASSAY OF SERUM POTASSIUM: CPT | Performed by: STUDENT IN AN ORGANIZED HEALTH CARE EDUCATION/TRAINING PROGRAM

## 2024-11-11 PROCEDURE — 85610 PROTHROMBIN TIME: CPT | Performed by: STUDENT IN AN ORGANIZED HEALTH CARE EDUCATION/TRAINING PROGRAM

## 2024-11-11 PROCEDURE — 87641 MR-STAPH DNA AMP PROBE: CPT | Performed by: STUDENT IN AN ORGANIZED HEALTH CARE EDUCATION/TRAINING PROGRAM

## 2024-11-11 PROCEDURE — 95715 VEEG EA 12-26HR INTMT MNTR: CPT

## 2024-11-11 PROCEDURE — 31622 DX BRONCHOSCOPE/WASH: CPT | Performed by: INTERNAL MEDICINE

## 2024-11-11 PROCEDURE — 2500000004 HC RX 250 GENERAL PHARMACY W/ HCPCS (ALT 636 FOR OP/ED): Performed by: NURSE PRACTITIONER

## 2024-11-11 PROCEDURE — 2720000007 HC OR 272 NO HCPCS

## 2024-11-11 PROCEDURE — C8929 TTE W OR WO FOL WCON,DOPPLER: HCPCS

## 2024-11-11 PROCEDURE — 99292 CRITICAL CARE ADDL 30 MIN: CPT | Performed by: ANESTHESIOLOGY

## 2024-11-11 PROCEDURE — 82947 ASSAY GLUCOSE BLOOD QUANT: CPT

## 2024-11-11 PROCEDURE — 85007 BL SMEAR W/DIFF WBC COUNT: CPT | Performed by: EMERGENCY MEDICINE

## 2024-11-11 PROCEDURE — 82330 ASSAY OF CALCIUM: CPT | Performed by: STUDENT IN AN ORGANIZED HEALTH CARE EDUCATION/TRAINING PROGRAM

## 2024-11-11 PROCEDURE — 2500000005 HC RX 250 GENERAL PHARMACY W/O HCPCS: Performed by: NURSE PRACTITIONER

## 2024-11-11 PROCEDURE — 5A1522G EXTRACORPOREAL OXYGENATION, MEMBRANE, PERIPHERAL VENO-ARTERIAL: ICD-10-PCS | Performed by: STUDENT IN AN ORGANIZED HEALTH CARE EDUCATION/TRAINING PROGRAM

## 2024-11-11 PROCEDURE — 2500000004 HC RX 250 GENERAL PHARMACY W/ HCPCS (ALT 636 FOR OP/ED): Mod: JZ

## 2024-11-11 PROCEDURE — 93306 TTE W/DOPPLER COMPLETE: CPT | Performed by: INTERNAL MEDICINE

## 2024-11-11 PROCEDURE — 71045 X-RAY EXAM CHEST 1 VIEW: CPT

## 2024-11-11 PROCEDURE — 2500000004 HC RX 250 GENERAL PHARMACY W/ HCPCS (ALT 636 FOR OP/ED)

## 2024-11-11 PROCEDURE — 88305 TISSUE EXAM BY PATHOLOGIST: CPT | Performed by: PATHOLOGY

## 2024-11-11 PROCEDURE — 31641 BRONCHOSCOPY TREAT BLOCKAGE: CPT | Performed by: INTERNAL MEDICINE

## 2024-11-11 PROCEDURE — 74018 RADEX ABDOMEN 1 VIEW: CPT

## 2024-11-11 PROCEDURE — 99233 SBSQ HOSP IP/OBS HIGH 50: CPT | Performed by: STUDENT IN AN ORGANIZED HEALTH CARE EDUCATION/TRAINING PROGRAM

## 2024-11-11 PROCEDURE — 95720 EEG PHY/QHP EA INCR W/VEEG: CPT | Performed by: PSYCHIATRY & NEUROLOGY

## 2024-11-11 PROCEDURE — 99223 1ST HOSP IP/OBS HIGH 75: CPT | Performed by: INTERNAL MEDICINE

## 2024-11-11 PROCEDURE — 2500000001 HC RX 250 WO HCPCS SELF ADMINISTERED DRUGS (ALT 637 FOR MEDICARE OP): Performed by: STUDENT IN AN ORGANIZED HEALTH CARE EDUCATION/TRAINING PROGRAM

## 2024-11-11 PROCEDURE — 3E033XZ INTRODUCTION OF VASOPRESSOR INTO PERIPHERAL VEIN, PERCUTANEOUS APPROACH: ICD-10-PCS | Performed by: STUDENT IN AN ORGANIZED HEALTH CARE EDUCATION/TRAINING PROGRAM

## 2024-11-11 PROCEDURE — 31622 DX BRONCHOSCOPE/WASH: CPT

## 2024-11-11 PROCEDURE — 99291 CRITICAL CARE FIRST HOUR: CPT | Performed by: NURSE PRACTITIONER

## 2024-11-11 PROCEDURE — 86901 BLOOD TYPING SEROLOGIC RH(D): CPT | Performed by: STUDENT IN AN ORGANIZED HEALTH CARE EDUCATION/TRAINING PROGRAM

## 2024-11-11 PROCEDURE — 94645 CONT INHLJ TX EACH ADDL HOUR: CPT

## 2024-11-11 PROCEDURE — 2500000005 HC RX 250 GENERAL PHARMACY W/O HCPCS: Performed by: EMERGENCY MEDICINE

## 2024-11-11 PROCEDURE — 85027 COMPLETE CBC AUTOMATED: CPT | Performed by: EMERGENCY MEDICINE

## 2024-11-11 PROCEDURE — 74018 RADEX ABDOMEN 1 VIEW: CPT | Performed by: RADIOLOGY

## 2024-11-11 PROCEDURE — 3E043XZ INTRODUCTION OF VASOPRESSOR INTO CENTRAL VEIN, PERCUTANEOUS APPROACH: ICD-10-PCS | Performed by: STUDENT IN AN ORGANIZED HEALTH CARE EDUCATION/TRAINING PROGRAM

## 2024-11-11 PROCEDURE — 2500000004 HC RX 250 GENERAL PHARMACY W/ HCPCS (ALT 636 FOR OP/ED): Mod: JZ | Performed by: STUDENT IN AN ORGANIZED HEALTH CARE EDUCATION/TRAINING PROGRAM

## 2024-11-11 PROCEDURE — 0BC18ZZ EXTIRPATION OF MATTER FROM TRACHEA, VIA NATURAL OR ARTIFICIAL OPENING ENDOSCOPIC: ICD-10-PCS | Performed by: INTERNAL MEDICINE

## 2024-11-11 PROCEDURE — 94644 CONT INHLJ TX 1ST HOUR: CPT

## 2024-11-11 PROCEDURE — 2500000005 HC RX 250 GENERAL PHARMACY W/O HCPCS: Performed by: STUDENT IN AN ORGANIZED HEALTH CARE EDUCATION/TRAINING PROGRAM

## 2024-11-11 PROCEDURE — P9045 ALBUMIN (HUMAN), 5%, 250 ML: HCPCS | Mod: JZ | Performed by: NURSE PRACTITIONER

## 2024-11-11 PROCEDURE — 36620 INSERTION CATHETER ARTERY: CPT | Mod: GC | Performed by: STUDENT IN AN ORGANIZED HEALTH CARE EDUCATION/TRAINING PROGRAM

## 2024-11-11 PROCEDURE — 2020000001 HC ICU ROOM DAILY

## 2024-11-11 PROCEDURE — 5A1945Z RESPIRATORY VENTILATION, 24-96 CONSECUTIVE HOURS: ICD-10-PCS | Performed by: STUDENT IN AN ORGANIZED HEALTH CARE EDUCATION/TRAINING PROGRAM

## 2024-11-11 PROCEDURE — 0BB18ZX EXCISION OF TRACHEA, VIA NATURAL OR ARTIFICIAL OPENING ENDOSCOPIC, DIAGNOSTIC: ICD-10-PCS | Performed by: INTERNAL MEDICINE

## 2024-11-11 PROCEDURE — 95700 EEG CONT REC W/VID EEG TECH: CPT

## 2024-11-11 PROCEDURE — 87070 CULTURE OTHR SPECIMN AEROBIC: CPT | Performed by: STUDENT IN AN ORGANIZED HEALTH CARE EDUCATION/TRAINING PROGRAM

## 2024-11-11 PROCEDURE — 87632 RESP VIRUS 6-11 TARGETS: CPT | Performed by: STUDENT IN AN ORGANIZED HEALTH CARE EDUCATION/TRAINING PROGRAM

## 2024-11-11 PROCEDURE — 37799 UNLISTED PX VASCULAR SURGERY: CPT | Performed by: STUDENT IN AN ORGANIZED HEALTH CARE EDUCATION/TRAINING PROGRAM

## 2024-11-11 PROCEDURE — P9045 ALBUMIN (HUMAN), 5%, 250 ML: HCPCS | Mod: JZ | Performed by: STUDENT IN AN ORGANIZED HEALTH CARE EDUCATION/TRAINING PROGRAM

## 2024-11-11 PROCEDURE — 0B9K8ZZ DRAINAGE OF RIGHT LUNG, VIA NATURAL OR ARTIFICIAL OPENING ENDOSCOPIC: ICD-10-PCS | Performed by: STUDENT IN AN ORGANIZED HEALTH CARE EDUCATION/TRAINING PROGRAM

## 2024-11-11 PROCEDURE — 36620 INSERTION CATHETER ARTERY: CPT | Performed by: STUDENT IN AN ORGANIZED HEALTH CARE EDUCATION/TRAINING PROGRAM

## 2024-11-11 PROCEDURE — 2500000004 HC RX 250 GENERAL PHARMACY W/ HCPCS (ALT 636 FOR OP/ED): Performed by: EMERGENCY MEDICINE

## 2024-11-11 PROCEDURE — 94640 AIRWAY INHALATION TREATMENT: CPT

## 2024-11-11 PROCEDURE — 2500000001 HC RX 250 WO HCPCS SELF ADMINISTERED DRUGS (ALT 637 FOR MEDICARE OP)

## 2024-11-11 PROCEDURE — 31624 DX BRONCHOSCOPE/LAVAGE: CPT | Performed by: STUDENT IN AN ORGANIZED HEALTH CARE EDUCATION/TRAINING PROGRAM

## 2024-11-11 PROCEDURE — 88305 TISSUE EXAM BY PATHOLOGIST: CPT | Mod: TC,SUR | Performed by: EMERGENCY MEDICINE

## 2024-11-11 PROCEDURE — 2500000004 HC RX 250 GENERAL PHARMACY W/ HCPCS (ALT 636 FOR OP/ED): Performed by: STUDENT IN AN ORGANIZED HEALTH CARE EDUCATION/TRAINING PROGRAM

## 2024-11-11 PROCEDURE — 83615 LACTATE (LD) (LDH) ENZYME: CPT | Performed by: STUDENT IN AN ORGANIZED HEALTH CARE EDUCATION/TRAINING PROGRAM

## 2024-11-11 RX ORDER — ASPIRIN 300 MG/1
150 SUPPOSITORY RECTAL DAILY
Status: DISCONTINUED | OUTPATIENT
Start: 2024-11-11 | End: 2024-11-12 | Stop reason: HOSPADM

## 2024-11-11 RX ORDER — ROCURONIUM BROMIDE 10 MG/ML
30 INJECTION, SOLUTION INTRAVENOUS ONCE
Status: COMPLETED | OUTPATIENT
Start: 2024-11-11 | End: 2024-11-11

## 2024-11-11 RX ORDER — ASPIRIN 81 MG/1
81 TABLET ORAL DAILY
Status: DISCONTINUED | OUTPATIENT
Start: 2024-11-11 | End: 2024-11-11

## 2024-11-11 RX ORDER — POTASSIUM CHLORIDE 29.8 MG/ML
40 INJECTION INTRAVENOUS ONCE
Status: COMPLETED | OUTPATIENT
Start: 2024-11-11 | End: 2024-11-11

## 2024-11-11 RX ORDER — CALCIUM GLUCONATE 20 MG/ML
1 INJECTION, SOLUTION INTRAVENOUS EVERY 6 HOURS PRN
Status: DISCONTINUED | OUTPATIENT
Start: 2024-11-11 | End: 2024-11-12 | Stop reason: HOSPADM

## 2024-11-11 RX ORDER — GLUCAGON 1 MG/ML
1 KIT INJECTION
Status: DISCONTINUED | OUTPATIENT
Start: 2024-11-11 | End: 2024-11-12

## 2024-11-11 RX ORDER — SODIUM CHLORIDE, SODIUM LACTATE, POTASSIUM CHLORIDE, CALCIUM CHLORIDE 600; 310; 30; 20 MG/100ML; MG/100ML; MG/100ML; MG/100ML
20 INJECTION, SOLUTION INTRAVENOUS CONTINUOUS
Status: ACTIVE | OUTPATIENT
Start: 2024-11-11 | End: 2024-11-12

## 2024-11-11 RX ORDER — FENTANYL CITRATE-0.9 % NACL/PF 10 MCG/ML
100 PLASTIC BAG, INJECTION (ML) INTRAVENOUS CONTINUOUS
Status: DISCONTINUED | OUTPATIENT
Start: 2024-11-11 | End: 2024-11-12 | Stop reason: HOSPADM

## 2024-11-11 RX ORDER — DEXTROSE 50 % IN WATER (D50W) INTRAVENOUS SYRINGE
10-50
Status: DISCONTINUED | OUTPATIENT
Start: 2024-11-11 | End: 2024-11-12

## 2024-11-11 RX ORDER — HEPARIN SODIUM 5000 [USP'U]/ML
5000 INJECTION, SOLUTION INTRAVENOUS; SUBCUTANEOUS EVERY 8 HOURS
Status: DISCONTINUED | OUTPATIENT
Start: 2024-11-11 | End: 2024-11-12 | Stop reason: HOSPADM

## 2024-11-11 RX ORDER — ALBUMIN HUMAN 50 G/1000ML
25 SOLUTION INTRAVENOUS ONCE
Status: COMPLETED | OUTPATIENT
Start: 2024-11-11 | End: 2024-11-11

## 2024-11-11 RX ORDER — FUROSEMIDE 10 MG/ML
80 INJECTION INTRAMUSCULAR; INTRAVENOUS ONCE
Status: COMPLETED | OUTPATIENT
Start: 2024-11-11 | End: 2024-11-11

## 2024-11-11 RX ORDER — DEXTROSE MONOHYDRATE 100 MG/ML
20 INJECTION, SOLUTION INTRAVENOUS AS NEEDED
Status: ACTIVE | OUTPATIENT
Start: 2024-11-11 | End: 2024-11-12

## 2024-11-11 RX ORDER — CALCIUM GLUCONATE 20 MG/ML
2 INJECTION, SOLUTION INTRAVENOUS EVERY 6 HOURS PRN
Status: DISCONTINUED | OUTPATIENT
Start: 2024-11-11 | End: 2024-11-12 | Stop reason: HOSPADM

## 2024-11-11 RX ORDER — MEROPENEM AND SODIUM CHLORIDE 1 G/50ML
1 INJECTION, SOLUTION INTRAVENOUS EVERY 12 HOURS
Status: DISCONTINUED | OUTPATIENT
Start: 2024-11-11 | End: 2024-11-12 | Stop reason: HOSPADM

## 2024-11-11 RX ORDER — NAPROXEN SODIUM 220 MG/1
81 TABLET, FILM COATED ORAL DAILY
Status: DISCONTINUED | OUTPATIENT
Start: 2024-11-11 | End: 2024-11-12 | Stop reason: HOSPADM

## 2024-11-11 RX ORDER — POTASSIUM CHLORIDE 29.8 MG/ML
INJECTION INTRAVENOUS
Status: COMPLETED
Start: 2024-11-11 | End: 2024-11-11

## 2024-11-11 RX ORDER — PROPOFOL 10 MG/ML
0-50 INJECTION, EMULSION INTRAVENOUS CONTINUOUS
Status: DISCONTINUED | OUTPATIENT
Start: 2024-11-11 | End: 2024-11-12 | Stop reason: HOSPADM

## 2024-11-11 RX ORDER — FLUCONAZOLE 2 MG/ML
200 INJECTION, SOLUTION INTRAVENOUS EVERY 24 HOURS
Status: DISCONTINUED | OUTPATIENT
Start: 2024-11-11 | End: 2024-11-12 | Stop reason: HOSPADM

## 2024-11-11 RX ORDER — ROCURONIUM BROMIDE 10 MG/ML
50 INJECTION, SOLUTION INTRAVENOUS ONCE
Status: COMPLETED | OUTPATIENT
Start: 2024-11-11 | End: 2024-11-11

## 2024-11-11 RX ADMIN — POTASSIUM CHLORIDE 40 MEQ: 400 INJECTION, SOLUTION INTRAVENOUS at 00:15

## 2024-11-11 RX ADMIN — PANTOPRAZOLE SODIUM 40 MG: 40 INJECTION, POWDER, FOR SOLUTION INTRAVENOUS at 06:19

## 2024-11-11 RX ADMIN — HYDROCORTISONE SODIUM SUCCINATE 50 MG: 100 INJECTION, POWDER, FOR SOLUTION INTRAMUSCULAR; INTRAVENOUS at 23:54

## 2024-11-11 RX ADMIN — HEPARIN SODIUM 1300 UNITS/HR: 10000 INJECTION, SOLUTION INTRAVENOUS at 07:51

## 2024-11-11 RX ADMIN — AMIODARONE HYDROCHLORIDE 150 MG: 1.5 INJECTION, SOLUTION INTRAVENOUS at 01:21

## 2024-11-11 RX ADMIN — VASOPRESSIN 0.03 UNITS/MIN: 0.2 INJECTION INTRAVENOUS at 07:54

## 2024-11-11 RX ADMIN — PROPOFOL 50 MCG/KG/MIN: 10 INJECTION, EMULSION INTRAVENOUS at 15:12

## 2024-11-11 RX ADMIN — MEROPENEM AND SODIUM CHLORIDE 1 G: 1 INJECTION, SOLUTION INTRAVENOUS at 13:11

## 2024-11-11 RX ADMIN — SODIUM CHLORIDE, POTASSIUM CHLORIDE, SODIUM LACTATE AND CALCIUM CHLORIDE 500 ML: 600; 310; 30; 20 INJECTION, SOLUTION INTRAVENOUS at 08:44

## 2024-11-11 RX ADMIN — SODIUM CHLORIDE, POTASSIUM CHLORIDE, SODIUM LACTATE AND CALCIUM CHLORIDE 50 ML/HR: 600; 310; 30; 20 INJECTION, SOLUTION INTRAVENOUS at 08:00

## 2024-11-11 RX ADMIN — EPINEPHRINE IN SODIUM CHLORIDE 0.1 MCG/KG/MIN: 16 INJECTION INTRAVENOUS at 22:00

## 2024-11-11 RX ADMIN — PERFLUTREN 3 ML OF DILUTION: 6.52 INJECTION, SUSPENSION INTRAVENOUS at 08:59

## 2024-11-11 RX ADMIN — ANGIOTENSIN II 15 NG/KG/MIN: 2.5 INJECTION INTRAVENOUS at 20:15

## 2024-11-11 RX ADMIN — SODIUM BICARBONATE 75 ML/HR: 84 INJECTION, SOLUTION INTRAVENOUS at 00:57

## 2024-11-11 RX ADMIN — MEROPENEM AND SODIUM CHLORIDE 1 G: 1 INJECTION, SOLUTION INTRAVENOUS at 23:54

## 2024-11-11 RX ADMIN — ACETAMINOPHEN 650 MG: 325 TABLET ORAL at 07:57

## 2024-11-11 RX ADMIN — FLUCONAZOLE 200 MG: 2 INJECTION, SOLUTION INTRAVENOUS at 02:45

## 2024-11-11 RX ADMIN — INSULIN HUMAN 2.8 UNITS/HR: 1 INJECTION, SOLUTION INTRAVENOUS at 07:56

## 2024-11-11 RX ADMIN — FUROSEMIDE 80 MG: 10 INJECTION, SOLUTION INTRAMUSCULAR; INTRAVENOUS at 03:34

## 2024-11-11 RX ADMIN — VANCOMYCIN HYDROCHLORIDE 1000 MG: 1 INJECTION, SOLUTION INTRAVENOUS at 00:26

## 2024-11-11 RX ADMIN — PROPOFOL 50 MCG/KG/MIN: 10 INJECTION, EMULSION INTRAVENOUS at 19:38

## 2024-11-11 RX ADMIN — TRANEXAMIC ACID 500 MG: 100 INJECTION INTRAVENOUS at 20:29

## 2024-11-11 RX ADMIN — POTASSIUM CHLORIDE 40 MEQ: 29.8 INJECTION, SOLUTION INTRAVENOUS at 13:13

## 2024-11-11 RX ADMIN — PROPOFOL 50 MCG/KG/MIN: 10 INJECTION, EMULSION INTRAVENOUS at 10:31

## 2024-11-11 RX ADMIN — Medication 75 MCG/HR: at 09:12

## 2024-11-11 RX ADMIN — AMIODARONE HYDROCHLORIDE 0.5 MG/MIN: 1.8 INJECTION, SOLUTION INTRAVENOUS at 07:55

## 2024-11-11 RX ADMIN — ROCURONIUM BROMIDE 30 MG: 10 INJECTION INTRAVENOUS at 01:56

## 2024-11-11 RX ADMIN — SODIUM CHLORIDE, POTASSIUM CHLORIDE, SODIUM LACTATE AND CALCIUM CHLORIDE 500 ML: 600; 310; 30; 20 INJECTION, SOLUTION INTRAVENOUS at 08:03

## 2024-11-11 RX ADMIN — ROCURONIUM 50 MG: 50 INJECTION, SOLUTION INTRAVENOUS at 08:30

## 2024-11-11 RX ADMIN — SODIUM CHLORIDE, POTASSIUM CHLORIDE, SODIUM LACTATE AND CALCIUM CHLORIDE 20 ML/HR: 600; 310; 30; 20 INJECTION, SOLUTION INTRAVENOUS at 13:10

## 2024-11-11 RX ADMIN — VASOPRESSIN 0.03 UNITS/MIN: 0.2 INJECTION INTRAVENOUS at 18:03

## 2024-11-11 RX ADMIN — HYDROCORTISONE SODIUM SUCCINATE 50 MG: 100 INJECTION, POWDER, FOR SOLUTION INTRAMUSCULAR; INTRAVENOUS at 06:17

## 2024-11-11 RX ADMIN — HYDROCORTISONE SODIUM SUCCINATE 50 MG: 100 INJECTION, POWDER, FOR SOLUTION INTRAMUSCULAR; INTRAVENOUS at 13:10

## 2024-11-11 RX ADMIN — PROPOFOL 50 MCG/KG/MIN: 10 INJECTION, EMULSION INTRAVENOUS at 23:54

## 2024-11-11 RX ADMIN — CISATRACURIUM BESYLATE 3 MCG/KG/MIN: 200 INJECTION, SOLUTION INTRAVENOUS at 09:27

## 2024-11-11 RX ADMIN — Medication 75 MCG/HR: at 20:14

## 2024-11-11 RX ADMIN — CALCIUM GLUCONATE 2 G: 20 INJECTION, SOLUTION INTRAVENOUS at 17:13

## 2024-11-11 RX ADMIN — POTASSIUM CHLORIDE 40 MEQ: 29.8 INJECTION, SOLUTION INTRAVENOUS at 06:15

## 2024-11-11 RX ADMIN — MEROPENEM AND SODIUM CHLORIDE 1 G: 1 INJECTION, SOLUTION INTRAVENOUS at 00:57

## 2024-11-11 RX ADMIN — Medication 100 PERCENT: at 20:29

## 2024-11-11 RX ADMIN — TRANEXAMIC ACID 500 MG: 100 INJECTION INTRAVENOUS at 12:50

## 2024-11-11 RX ADMIN — ALBUMIN HUMAN 25 G: 0.05 INJECTION, SOLUTION INTRAVENOUS at 00:15

## 2024-11-11 RX ADMIN — AMIODARONE HYDROCHLORIDE 1 MG/MIN: 1.8 INJECTION, SOLUTION INTRAVENOUS at 01:20

## 2024-11-11 RX ADMIN — EPOPROSTENOL 0.05 MCG/KG/MIN: 1.5 INJECTION, POWDER, LYOPHILIZED, FOR SOLUTION INTRAVENOUS at 02:41

## 2024-11-11 RX ADMIN — HYDROCORTISONE SODIUM SUCCINATE 50 MG: 100 INJECTION, POWDER, FOR SOLUTION INTRAMUSCULAR; INTRAVENOUS at 18:13

## 2024-11-11 RX ADMIN — Medication 50 PERCENT: at 07:50

## 2024-11-11 RX ADMIN — ASPIRIN 150 MG: 300 SUPPOSITORY RECTAL at 13:08

## 2024-11-11 RX ADMIN — AMIODARONE HYDROCHLORIDE 0.5 MG/MIN: 1.8 INJECTION, SOLUTION INTRAVENOUS at 18:03

## 2024-11-11 RX ADMIN — ALBUMIN HUMAN 25 G: 0.05 INJECTION, SOLUTION INTRAVENOUS at 15:06

## 2024-11-11 RX ADMIN — POTASSIUM CHLORIDE 40 MEQ: 29.8 INJECTION, SOLUTION INTRAVENOUS at 00:15

## 2024-11-11 RX ADMIN — INSULIN HUMAN 2.2 UNITS/HR: 1 INJECTION, SOLUTION INTRAVENOUS at 03:41

## 2024-11-11 RX ADMIN — PROPOFOL 40 MCG/KG/MIN: 10 INJECTION, EMULSION INTRAVENOUS at 05:21

## 2024-11-11 RX ADMIN — NOREPINEPHRINE BITARTRATE 0.08 MCG/KG/MIN: 8 INJECTION, SOLUTION INTRAVENOUS at 07:53

## 2024-11-11 RX ADMIN — EPOPROSTENOL 0.05 MCG/KG/MIN: 1.5 INJECTION, POWDER, LYOPHILIZED, FOR SOLUTION INTRAVENOUS at 16:00

## 2024-11-11 RX ADMIN — HYDROCORTISONE SODIUM SUCCINATE 50 MG: 100 INJECTION, POWDER, FOR SOLUTION INTRAMUSCULAR; INTRAVENOUS at 00:26

## 2024-11-11 RX ADMIN — HEPARIN SODIUM 5000 UNITS: 5000 INJECTION, SOLUTION INTRAVENOUS; SUBCUTANEOUS at 17:14

## 2024-11-11 SDOH — SOCIAL STABILITY: SOCIAL INSECURITY: ARE YOU OR HAVE YOU BEEN THREATENED OR ABUSED PHYSICALLY, EMOTIONALLY, OR SEXUALLY BY ANYONE?: UNABLE TO ASSESS

## 2024-11-11 SDOH — SOCIAL STABILITY: SOCIAL INSECURITY
WITHIN THE LAST YEAR, HAVE YOU BEEN RAPED OR FORCED TO HAVE ANY KIND OF SEXUAL ACTIVITY BY YOUR PARTNER OR EX-PARTNER?: PATIENT UNABLE TO ANSWER

## 2024-11-11 SDOH — SOCIAL STABILITY: SOCIAL INSECURITY
WITHIN THE LAST YEAR, HAVE YOU BEEN KICKED, HIT, SLAPPED, OR OTHERWISE PHYSICALLY HURT BY YOUR PARTNER OR EX-PARTNER?: PATIENT UNABLE TO ANSWER

## 2024-11-11 SDOH — SOCIAL STABILITY: SOCIAL NETWORK: HOW OFTEN DO YOU ATTEND CHURCH OR RELIGIOUS SERVICES?: PATIENT UNABLE TO ANSWER

## 2024-11-11 SDOH — ECONOMIC STABILITY: FOOD INSECURITY
HOW HARD IS IT FOR YOU TO PAY FOR THE VERY BASICS LIKE FOOD, HOUSING, MEDICAL CARE, AND HEATING?: PATIENT UNABLE TO ANSWER

## 2024-11-11 SDOH — SOCIAL STABILITY: SOCIAL NETWORK
DO YOU BELONG TO ANY CLUBS OR ORGANIZATIONS SUCH AS CHURCH GROUPS, UNIONS, FRATERNAL OR ATHLETIC GROUPS, OR SCHOOL GROUPS?: PATIENT UNABLE TO ANSWER

## 2024-11-11 SDOH — ECONOMIC STABILITY: FOOD INSECURITY
WITHIN THE PAST 12 MONTHS, THE FOOD YOU BOUGHT JUST DIDN'T LAST AND YOU DIDN'T HAVE MONEY TO GET MORE.: PATIENT UNABLE TO ANSWER

## 2024-11-11 SDOH — SOCIAL STABILITY: SOCIAL INSECURITY: WITHIN THE LAST YEAR, HAVE YOU BEEN AFRAID OF YOUR PARTNER OR EX-PARTNER?: PATIENT UNABLE TO ANSWER

## 2024-11-11 SDOH — SOCIAL STABILITY: SOCIAL NETWORK: HOW OFTEN DO YOU GET TOGETHER WITH FRIENDS OR RELATIVES?: PATIENT UNABLE TO ANSWER

## 2024-11-11 SDOH — SOCIAL STABILITY: SOCIAL NETWORK: HOW OFTEN DO YOU ATTEND MEETINGS OF THE CLUBS OR ORGANIZATIONS YOU BELONG TO?: PATIENT UNABLE TO ANSWER

## 2024-11-11 SDOH — HEALTH STABILITY: MENTAL HEALTH: HOW OFTEN DO YOU HAVE A DRINK CONTAINING ALCOHOL?: PATIENT UNABLE TO ANSWER

## 2024-11-11 SDOH — HEALTH STABILITY: PHYSICAL HEALTH
HOW OFTEN DO YOU NEED TO HAVE SOMEONE HELP YOU WHEN YOU READ INSTRUCTIONS, PAMPHLETS, OR OTHER WRITTEN MATERIAL FROM YOUR DOCTOR OR PHARMACY?: PATIENT UNABLE TO RESPOND

## 2024-11-11 SDOH — SOCIAL STABILITY: SOCIAL INSECURITY: DO YOU FEEL UNSAFE GOING BACK TO THE PLACE WHERE YOU ARE LIVING?: UNABLE TO ASSESS

## 2024-11-11 SDOH — SOCIAL STABILITY: SOCIAL INSECURITY: HAVE YOU HAD THOUGHTS OF HARMING ANYONE ELSE?: UNABLE TO ASSESS

## 2024-11-11 SDOH — HEALTH STABILITY: MENTAL HEALTH: HOW OFTEN DO YOU HAVE SIX OR MORE DRINKS ON ONE OCCASION?: PATIENT UNABLE TO ANSWER

## 2024-11-11 SDOH — SOCIAL STABILITY: SOCIAL INSECURITY: HAS ANYONE EVER THREATENED TO HURT YOUR FAMILY OR YOUR PETS?: UNABLE TO ASSESS

## 2024-11-11 SDOH — HEALTH STABILITY: MENTAL HEALTH
DO YOU FEEL STRESS - TENSE, RESTLESS, NERVOUS, OR ANXIOUS, OR UNABLE TO SLEEP AT NIGHT BECAUSE YOUR MIND IS TROUBLED ALL THE TIME - THESE DAYS?: PATIENT UNABLE TO ANSWER

## 2024-11-11 SDOH — SOCIAL STABILITY: SOCIAL INSECURITY: ARE YOU MARRIED, WIDOWED, DIVORCED, SEPARATED, NEVER MARRIED, OR LIVING WITH A PARTNER?: PATIENT UNABLE TO ANSWER

## 2024-11-11 SDOH — SOCIAL STABILITY: SOCIAL INSECURITY
WITHIN THE LAST YEAR, HAVE YOU BEEN HUMILIATED OR EMOTIONALLY ABUSED IN OTHER WAYS BY YOUR PARTNER OR EX-PARTNER?: PATIENT UNABLE TO ANSWER

## 2024-11-11 SDOH — ECONOMIC STABILITY: HOUSING INSECURITY: AT ANY TIME IN THE PAST 12 MONTHS, WERE YOU HOMELESS OR LIVING IN A SHELTER (INCLUDING NOW)?: PATIENT UNABLE TO ANSWER

## 2024-11-11 SDOH — ECONOMIC STABILITY: FOOD INSECURITY
WITHIN THE PAST 12 MONTHS, YOU WORRIED THAT YOUR FOOD WOULD RUN OUT BEFORE YOU GOT THE MONEY TO BUY MORE.: PATIENT UNABLE TO ANSWER

## 2024-11-11 SDOH — HEALTH STABILITY: PHYSICAL HEALTH: ON AVERAGE, HOW MANY MINUTES DO YOU ENGAGE IN EXERCISE AT THIS LEVEL?: PATIENT UNABLE TO ANSWER

## 2024-11-11 SDOH — ECONOMIC STABILITY: INCOME INSECURITY
IN THE PAST 12 MONTHS HAS THE ELECTRIC, GAS, OIL, OR WATER COMPANY THREATENED TO SHUT OFF SERVICES IN YOUR HOME?: PATIENT UNABLE TO ANSWER

## 2024-11-11 SDOH — ECONOMIC STABILITY: HOUSING INSECURITY
IN THE LAST 12 MONTHS, WAS THERE A TIME WHEN YOU WERE NOT ABLE TO PAY THE MORTGAGE OR RENT ON TIME?: PATIENT UNABLE TO ANSWER

## 2024-11-11 SDOH — HEALTH STABILITY: MENTAL HEALTH: HOW MANY DRINKS CONTAINING ALCOHOL DO YOU HAVE ON A TYPICAL DAY WHEN YOU ARE DRINKING?: PATIENT UNABLE TO ANSWER

## 2024-11-11 SDOH — SOCIAL STABILITY: SOCIAL NETWORK: IN A TYPICAL WEEK, HOW MANY TIMES DO YOU TALK ON THE PHONE WITH FAMILY, FRIENDS, OR NEIGHBORS?: PATIENT UNABLE TO ANSWER

## 2024-11-11 SDOH — SOCIAL STABILITY: SOCIAL INSECURITY: DOES ANYONE TRY TO KEEP YOU FROM HAVING/CONTACTING OTHER FRIENDS OR DOING THINGS OUTSIDE YOUR HOME?: UNABLE TO ASSESS

## 2024-11-11 SDOH — SOCIAL STABILITY: SOCIAL INSECURITY: WERE YOU ABLE TO COMPLETE ALL THE BEHAVIORAL HEALTH SCREENINGS?: NO

## 2024-11-11 SDOH — ECONOMIC STABILITY: TRANSPORTATION INSECURITY
IN THE PAST 12 MONTHS, HAS LACK OF TRANSPORTATION KEPT YOU FROM MEDICAL APPOINTMENTS OR FROM GETTING MEDICATIONS?: PATIENT UNABLE TO ANSWER

## 2024-11-11 SDOH — HEALTH STABILITY: PHYSICAL HEALTH
ON AVERAGE, HOW MANY DAYS PER WEEK DO YOU ENGAGE IN MODERATE TO STRENUOUS EXERCISE (LIKE A BRISK WALK)?: PATIENT UNABLE TO ANSWER

## 2024-11-11 SDOH — SOCIAL STABILITY: SOCIAL INSECURITY: ABUSE: ADULT

## 2024-11-11 SDOH — ECONOMIC STABILITY: HOUSING INSECURITY: IN THE PAST 12 MONTHS, HOW MANY TIMES HAVE YOU MOVED WHERE YOU WERE LIVING?: 0

## 2024-11-11 SDOH — SOCIAL STABILITY: SOCIAL INSECURITY: ARE THERE ANY APPARENT SIGNS OF INJURIES/BEHAVIORS THAT COULD BE RELATED TO ABUSE/NEGLECT?: UNABLE TO ASSESS

## 2024-11-11 SDOH — SOCIAL STABILITY: SOCIAL INSECURITY

## 2024-11-11 SDOH — SOCIAL STABILITY: SOCIAL INSECURITY: DO YOU FEEL ANYONE HAS EXPLOITED OR TAKEN ADVANTAGE OF YOU FINANCIALLY OR OF YOUR PERSONAL PROPERTY?: UNABLE TO ASSESS

## 2024-11-11 ASSESSMENT — LIFESTYLE VARIABLES
SKIP TO QUESTIONS 9-10: 0
AUDIT-C TOTAL SCORE: -1
AUDIT-C TOTAL SCORE: -1
HOW MANY STANDARD DRINKS CONTAINING ALCOHOL DO YOU HAVE ON A TYPICAL DAY: PATIENT UNABLE TO ANSWER
SKIP TO QUESTIONS 9-10: 0
AUDIT-C TOTAL SCORE: -1
HOW OFTEN DO YOU HAVE A DRINK CONTAINING ALCOHOL: PATIENT UNABLE TO ANSWER
HOW OFTEN DO YOU HAVE 6 OR MORE DRINKS ON ONE OCCASION: PATIENT UNABLE TO ANSWER

## 2024-11-11 ASSESSMENT — PAIN - FUNCTIONAL ASSESSMENT
PAIN_FUNCTIONAL_ASSESSMENT: CPOT (CRITICAL CARE PAIN OBSERVATION TOOL)

## 2024-11-11 ASSESSMENT — ACTIVITIES OF DAILY LIVING (ADL)
TOILETING: UNABLE TO ASSESS
JUDGMENT_ADEQUATE_SAFELY_COMPLETE_DAILY_ACTIVITIES: UNABLE TO ASSESS
LACK_OF_TRANSPORTATION: PATIENT UNABLE TO ANSWER
DRESSING YOURSELF: UNABLE TO ASSESS
PATIENT'S MEMORY ADEQUATE TO SAFELY COMPLETE DAILY ACTIVITIES?: UNABLE TO ASSESS
LACK_OF_TRANSPORTATION: PATIENT UNABLE TO ANSWER
ADEQUATE_TO_COMPLETE_ADL: UNABLE TO ASSESS
HEARING - RIGHT EAR: UNABLE TO ASSESS
LACK_OF_TRANSPORTATION: PATIENT UNABLE TO ANSWER
HEARING - LEFT EAR: UNABLE TO ASSESS
FEEDING YOURSELF: UNABLE TO ASSESS
BATHING: UNABLE TO ASSESS
WALKS IN HOME: UNABLE TO ASSESS
GROOMING: UNABLE TO ASSESS

## 2024-11-11 ASSESSMENT — COGNITIVE AND FUNCTIONAL STATUS - GENERAL: PATIENT BASELINE BEDBOUND: UNABLE TO ASSESS AT THIS TIME

## 2024-11-11 ASSESSMENT — PATIENT HEALTH QUESTIONNAIRE - PHQ9
2. FEELING DOWN, DEPRESSED OR HOPELESS: NOT AT ALL
SUM OF ALL RESPONSES TO PHQ9 QUESTIONS 1 & 2: 0
1. LITTLE INTEREST OR PLEASURE IN DOING THINGS: NOT AT ALL

## 2024-11-11 ASSESSMENT — PAIN SCALES - GENERAL: PAINLEVEL_OUTOF10: 0 - NO PAIN

## 2024-11-11 NOTE — OP NOTE
* Surgery not found * Operative Note     Date:  11/10/2024 OR Location: [facility] 19    Name: Carl Vazquez, : 1965, Age: 59 y.o., MRN: [id number], Sex: male    Diagnosis  * No surgery found * Cardiogenic cora. Probably PE Femoral VA ECMO     Procedures  1.  Percutaneous left femoral VA ECMO with reperfusion cannula    Surgeons   Dr. Brian Yepez    Resident/Fellow/Other Assistant:  Fam bond was the assistant    Staff:       Anesthesia Staff: No anesthesia.  The procedure was done as an emergency in the ICU.    Procedure Summary  Anesthesia: Anesthesia type cannot be found on the log.  ASA: ASA status cannot be found on the log.  Estimated Blood Loss: 150 cc mL  Intra-op Medications: * Intraprocedure medication information is unavailable because the case start and end events have not been set *      Intraprocedure I/O Totals       None           Specimen: * Cannot find log *              Drains and/or Catheters:   NG/OG/Feeding Tube OG - [place] sump Left mouth (Active)       Urethral Catheter Non-latex 16 Fr. (Active)       Tourniquet Times:         Implants:* No surgery found *     Findings:   Both groin have hematomas for previous ABG sample.  On the echo both the femoral vein and the femoral artery were surrounded by hematoma.  Indications: Carl Vazquez is an 59 y.o. male who is having surgery for .  The patient was admitted to [facility], operated by [facility] with increasing short of breath.  He was transferred to the ICU, intubated and the shock team was activated with the probably diagnosis of PE.  Despite maximal medical treatment, intubation and inotropic support the patient continued to deteriorate.  Decision was made to place the patient on VA ECMO and transferred to the [facility].  We discussed with the family the risk and benefit of the procedure including hematoma ,bleeding and of course the risk of dying.  The wife consent to proceed.  The patient was seen in the preoperative area. The risks,  benefits, complications, treatment options, non-operative alternatives, expected recovery and outcomes were discussed with the patient. The possibilities of reaction to medication, pulmonary aspiration, injury to surrounding structures, bleeding, recurrent infection, the need for additional procedures, failure to diagnose a condition, and creating a complication requiring transfusion or operation were discussed with the patient. The patient concurred with the proposed plan, giving informed consent.  The site of surgery was properly noted/marked if necessary per policy. The patient has been actively warmed in preoperative area. Preoperative antibiotics have been ordered and given within 1 hours of incision. Venous thrombosis prophylaxis are not indicated.    Procedure Details: The patient was intubated in the ICU, both groins were prepped and draped in the usual fashion for the femoral access.  After the timeout was performed and under echographic guidance we performed decannulation with the following sequence.  1.  Reperfusion line on the left femoral artery under echographic guidance and Seldinger technique.  2.  We placed a 17 arterial cannula in the left femoral artery under echographic guidance and using Seldinger technique.  3.  We placed a 23 venous cannula in the femoral vein and the again aorta for guidance and using Seldinger technique.  After verifying the integrity of the circumflex with without air the patient was connected to the VA ECMO and the support was started.  Almost immediately saturation went up to more than 97%, we were able to wean all the inotropes.  Decannulate were secured and the patient was transferred to the main campus.  Complications:  None; patient tolerated the procedure well.    Disposition: ICU - intubated and critically ill.  Condition: unstable                 Additional Details: Both groins have large hematomas for previous punctures.    Attending Attestation: I performed the  procedure.    Brian Yepez MD

## 2024-11-11 NOTE — PROGRESS NOTES
Met briefly with pt's wife Adamaris, son Jose Antonio, sister In law, and niece Kathleen in waiting room, right after pt was consented for a pulmonary procedure.  Wife and family were very stressed.  It was not a good time to talk, but able to introduce role of palliative care as a supportive care service for people who are critically ill.  Pall care team will continue to support as able during hospital stay.      ADIN Travis

## 2024-11-11 NOTE — PROGRESS NOTES
Physical Therapy                 Therapy Communication Note    Patient Name: Carl Vazquez  MRN: [id number]  Department: [facility] [id number] NEURODG  Room: 04/04-A  Today's Date: 11/11/2024     Discipline: Physical Therapy    Missed Visit Reason:  (Pt remains intubated/sedated and on ECMO at this time.  Will hold PT at this time and re-attempt once medically appropriate.)    Missed Time: Attempt

## 2024-11-11 NOTE — CONSULTS
Vancomycin Dosing by Pharmacy- INITIAL    Carl Vazquez is a 59 y.o. year old male who Pharmacy has been consulted for vancomycin dosing for pneumonia. Based on the patient's indication and renal status this patient will be dosed based on a goal trough/random level of 15-20.     Renal function is currently declining. Received 2 dose of vanco on  then renal function declined, dose by level for now.    Visit Vitals  Resp 22        Lab Results   Component Value Date    CREATININE 1.89 (H) 11/10/2024    CREATININE 1.87 (H) 11/10/2024    CREATININE 1.44 (H) 11/10/2024    CREATININE 1.24 2024        Patient weight is as follows: There were no vitals filed for this visit.    Cultures:  No results found for the encounter in last 14 days.        No intake/output data recorded.  I/O during current shift:  No intake/output data recorded.    Temp (24hrs), Av.8 °C (98.3 °F), Min:36.7 °C (98.1 °F), Max:36.9 °C (98.4 °F)         Assessment/Plan     Patient already received vanco on    Will initiate vanco 1500 mg x1 dose. Pt should have cleared enough of first two doses from  prior to renal function declining  Follow-up level will be ordered on  at 0000 unless clinically indicated sooner.  Will continue to monitor renal function daily while on vancomycin and order serum creatinine at least every 48 hours if not already ordered.  Follow for continued vancomycin needs, clinical response, and signs/symptoms of toxicity.       Lamin Main, PharmD

## 2024-11-11 NOTE — SIGNIFICANT EVENT
"Preliminary EEG Report     This vEEG is consistent with a severe diffuse encephalopathy. No epileptiform discharges or lateralizing signs are seen.    This EEG was read from 4:01 AM to 4:24 AM on 11/11/24 .     The final impression will be available tomorrow under Chart Review in the Media tab. If the plan is to discontinue the video EEG, please place a \"Discontinue Continuous VEEG\" order in the EMR; otherwise the EEG tech will not receive the notification.      Tabatha Arevalo MD  Adult Epilepsy Fellow  [facility] [place]      "

## 2024-11-11 NOTE — H&P
CTICU History & Physical    Subjective   HPI:  Carl Vazquez is a 59 y.o. year old male patient with Past Medical History of CABG x 4 with pulmonary quick location/AV valve replacement, history of thoracotomy with pleurodesis for recurrent pleural effusions, NSTEMI 2020 with PCI to RCA due to RCA graft failing, patient is on antibiotics for leukocytosis, history of Hodgkin's lymphoma greater than 40 years ago, hypertension, type II DM, history of venous thrombus, and hypertension presented to [facility] emergency department for shortness of breath that has been ongoing for the previous month. Patient completed 2 courses of antibiotics doxycycline and azithromycin he initially started to feel better, but returned significantly worse.     CT PE c/f pulmonary emboli within the distal right main pulmonary artery and segmental arteries of the right middle and lower lobes and R heart strain. CXR s/f opacification of R hemithorax. TTE s/f EF 20-20%, global hypokinesis of LV, severly reduced RV fucntion, Kasper's sign Previous echo from 11/8 EF 50-55%. Pt started on heparin infusion. Pt developed severe respiratory distress. ABG 7.25/36/278/15.8, lac 9.5. Pt was hemodynamically unstable and started on levo and epi. Pt intubated. Amps of bicarb given.     Given patient's unstable status in setting of cardiogenic vs septic shock vs obstructive, shock call placed and decision made to cannulate patient for VA ECMO. Patient now presenting s/p percutaneous left femoral VA ECMO with reperfusion cannula with Dr. Agarwal.     Cardiac Testing:     TTE (11/10/24):  1. Left ventricular ejection fraction is severely decreased, by visual estimate at 20-25%.  2. There is global hypokinesis of the left ventricle with minor regional variations.  3. There is severely reduced right ventricular systolic function.  4. Kasper's sign is present, suggestive of pulmonary embolism.  5. Severely enlarged right ventricle.  6. Slightly  elevated right ventricular systolic pressure.    [facility]:  Unable to access file        Past Medical History:   Diagnosis Date    Aortic valve disorder 2015    Basal cell carcinoma 2024    Chest pain 2024    Chronic systolic heart failure 2024    Coronary arteriosclerosis 2024    Essential hypertension 2024    Hodgkin lymphoma 2024    Hx of replacement of aortic valve 2024    Mixed hyperlipidemia 2005    Pleural effusion 2024    Shortness of breath 2024    Type 2 diabetes mellitus without complication (Multi) 2023    Venous thrombosis 2024     Past Surgical History:   Procedure Laterality Date    CT GUIDED CHEST TUBE PLACEMENT  2018    CT GUIDED CHEST TUBE PLACEMENT [facility]     Medications Prior to Admission   Medication Sig Dispense Refill Last Dose/Taking    albuterol (Ventolin HFA) 90 mcg/actuation inhaler Inhale 2 puffs every 4 hours if needed for wheezing or shortness of breath. 18 g 0     aspirin 81 mg EC tablet Take 1 tablet (81 mg) by mouth every other day.       atorvastatin (Lipitor) 80 mg tablet Take 1 tablet (80 mg) by mouth once daily. 90 tablet 3     [] azithromycin (Zithromax) 250 mg tablet Take 2 tablets (500 mg) by mouth once daily for 1 day, THEN 1 tablet (250 mg) once daily for 4 days. Take 2 tabs (500 mg) by mouth today, than 1 daily for 4 days.. 6 tablet 0     clopidogrel (Plavix) 75 mg tablet TAKE 1 TABLET DAILY 90 tablet 3     hydrOXYzine pamoate (VistariL) 25 mg capsule Take 1 capsule (25 mg) by mouth 3 times a day as needed for anxiety for up to 10 days. 30 capsule 0     levothyroxine (Synthroid, Levoxyl) 25 mcg tablet TAKE 1 TABLET DAILY IN THE MORNING ON AN EMPTY STOMACH 90 tablet 0     lisinopril 5 mg tablet Take 1 tablet (5 mg) by mouth once daily.       metFORMIN, OSM, (Fortamet) 500 mg 24 hr tablet Take 1 tablet (500 mg) by mouth once daily in the evening. Take with meals. Do not crush,  chew, or split.       metoprolol tartrate (Lopressor) 25 mg tablet TAKE 1 TABLET TWICE A DAY WITH FOOD 180 tablet 3     multivitamin tablet Take 1 tablet by mouth once daily.       pantoprazole (ProtoNix) 40 mg EC tablet Take 1 tablet (40 mg) by mouth once daily in the morning. Take before meals. 90 tablet 3     PARoxetine (Paxil) 10 mg tablet TAKE 1 TABLET DAILY IN THE MORNING 90 tablet 0     promethazine-DM (Phenergan-DM) 6.25-15 mg/5 mL syrup Take 5 mL by mouth every 4 hours if needed for cough. 118 mL 0     sildenafil (Viagra) 50 mg tablet Take 1 tablet (50 mg) by mouth if needed for erectile dysfunction for up to 60 doses. 10 tablet 5     torsemide (Demadex) 100 mg tablet Take 1 tablet (100 mg) by mouth once daily.        Ativan [lorazepam] and Phenobarbital  Social History     Tobacco Use    Smoking status: Never     Passive exposure: Never    Smokeless tobacco: Never   Substance Use Topics    Alcohol use: Yes     Alcohol/week: 6.0 standard drinks of alcohol     Types: 6 Cans of beer per week    Drug use: Never     Family History   Problem Relation Name Age of Onset    No Known Problems Mother      No Known Problems Father         Review of Systems:  Unable to perform as pt intubated and sedated     Objective   Vitals:  Most Recent:  Vitals:    11/11/24 0000   Pulse: (!) 112   Resp: (!) 0   SpO2: 94%       24hr Min/Max:  Temp  Min: 36.7 °C (98.1 °F)  Max: 36.9 °C (98.4 °F)  Pulse  Min: 62  Max: 146  BP  Min: 41/23  Max: 190/146  Resp  Min: 0  Max: 49  SpO2  Min: 58 %  Max: 100 %    I/O:  No intake/output data recorded.    LDA:  CVC 11/10/24 Triple lumen Non-tunneled Left Internal jugular (Active)   Placement Date/Time: 11/10/24 1530   Hand Hygiene Performed Prior to CVC Insertion: Yes  Site Prep: Chlorhexidine   Site Prep Agent has Completely Dried Before Insertion: Yes  All 5 Sterile Barriers Used (Gloves, Gown, Cap, Mask, Large Sterile Drape):...   Number of days: 0       Arterial Line 11/10/24 Right  Axillary (Active)   Placement Date/Time: 11/10/24 1830   Hand Hygiene Completed: Yes  Orientation: Right  Location: Axillary  Site Prep: Chlorhexidine   Local Anesthetic: None  Technique: Ultrasound guidance  Placed by: Dean Dorantes  Insertion attempts: 1  Securement Meth...   Number of days: 0       ETT  8 mm (Active)   Placement Date/Time: 11/10/24 1405   Placed by External Staff?: Other (Comment)  Hand Hygiene Completed: Yes  Technique: Video laryngoscopy  ETT Type: ETT - single  Single Lumen Tube Size: 8 mm  Cuffed: Yes  Laryngoscope: Jl  Blade Size: 3  Loc...   Number of days: 0       Urethral Catheter Non-latex 16 Fr. (Active)   Placement Date/Time: 11/10/24 1430   Placed by: ERIN Marsh RN  Hand Hygiene Completed: Yes  Catheter Type: Non-latex  Tube Size (Fr.): 16 Fr.  Catheter Balloon Size: 10 mL  Urine Returned: (c) Yes   Number of days: 0       NG/OG/Feeding Tube OG - [place] sump Left mouth (Active)   Placement Date/Time: 11/10/24 1408   Placed by: Dr. Frye  Hand Hygiene Completed: Yes  Type of Tube: NG/OG Tube  Tube Type: OG - [place] sump  Tube Location: Left mouth   Number of days: 0       Physical Exam:   - CONSTITUTION: Ill-appearing middle aged male   - NEUROLOGIC: Intubated and sedated   - CARDIOVASCULAR: Sinus tachy  - RESPIRATORY: ETT present with equal chest rise   - GI: non-distended   - : normal male external genitalia   - EXTREMITIES: cold distal extrm, mild cyanosis; L fem cannula in place   - SKIN: no rashes/petechia noted   - PSYCHIATRIC: normal mood/affect      Lab Review:  Results from last 7 days   Lab Units 11/10/24  2149   WBC AUTO x10*3/uL 44.3*   HEMOGLOBIN g/dL 10.9*   HEMATOCRIT % 31.5*   PLATELETS AUTO x10*3/uL 231     Results from last 7 days   Lab Units 11/10/24  2149   SODIUM mmol/L 143   POTASSIUM mmol/L 2.9*   CHLORIDE mmol/L 93*   CO2 mmol/L 18*   BUN mg/dL 22   CREATININE mg/dL 1.89*   CALCIUM mg/dL 8.2*   PROTEIN TOTAL g/dL 4.5*   BILIRUBIN TOTAL mg/dL 2.4*    ALK PHOS U/L 169*   ALT U/L 377*   AST U/L 1,144*   GLUCOSE mg/dL 208*     Results from last 7 days   Lab Units 11/10/24  2149   MAGNESIUM mg/dL 2.52*     Results from last 7 days   Lab Units 11/10/24  2349   POCT PH, ARTERIAL pH 7.32*   POCT PCO2, ARTERIAL mm Hg 20*   POCT PO2, ARTERIAL mm Hg 108*   POCT HCO3 CALCULATED, ARTERIAL mmol/L 10.3*   POCT BASE EXCESS, ARTERIAL mmol/L -13.7*       Most recent labs and imaging reviewed.    Daily Risk Screen  Intubated:  Central line:  [place]:    Assessment/Plan     Assessment:  Carl Vazquez is a 59 y.o. year old male patient with Past Medical History of CABG x 4 with pulmonary quick location/AV valve replacement, history of thoracotomy with pleurodesis for recurrent pleural effusions, NSTEMI 2020 with PCI to RCA due to RCA graft failing, patient is on antibiotics for leukocytosis, history of Hodgkin's lymphoma greater than 40 years ago, hypertension, type II DM, history of venous thrombus, and hypertension presented to [facility] emergency department for shortness of breath that has been ongoing for the previous month.Course c/b hypoxic respiratory failure necessitating intubation and hemodynamic instability.Given patient's unstable status in setting of cardiogenic (acutely reduced EF and RV function) vs septic shock vs obstructive (potential PE), shock call placed and decision made to cannulate patient for VA ECMO. Patient now presenting s/p percutaneous left femoral VA ECMO with reperfusion cannula with Dr. Agarwal.      Plan:  NEURO:  No Pmhx. Patient is intubated and sedated on propofol infusion.  -->  - EEG ordered for abnormal leg jerking movement   - Serial neuro and pain assessments   - Continue propofol   - Scheduled Tylenol   - PRN oxycodone  - PRN dilaudid for pain   - PT Consult  - CAM ICU score qshift  - Sleep/wake cycle hygiene  - Hold home Paroxetine     CV:  Patient has a history of CABG x 4 with pulmonary quick location/AV valve replacement, NSTEMI  2020 with PCI to RCA due to RCA graft failing, HTN Is now status post VA ECMO cannulation 11/10. TTE  11/10 s/f EF 20-25%, reduced RV function.  Arrived to CTICU on levo at 0.9, epi at 0.7, vaso 0.03 -->  - Epi at 0.15, levo at 0.15 and vaso at 0.06. Pt remains hemodynamically unstable, plan to add angiotensin   - Repeat TTE  - Maintain goal MAP >65  - Mixed venous and CI Q4H  - Volume resuscitate as clinically indicated  - Hold home Atorvastatin, lisinopril, torsemide, metoprolol    ECMO:     Most Recent Range Past 24hrs   Flow 4.7 Patient Flow (L/min)  Min: 4.7  Max: 4.85   Speed 3925 Circuit Flow (RPM)  Min: 3925  Max: 3995   Sweep 1 Sweep Gas Flow Rate (L/min)  Min: 1  Max: 2         PULM:  History of thoracotomy with pleurodesis for recurrent pleural effusions.Pt recently presenetd with SOB and was given 2 courses of doxycycline and azithromycin. OSH, pt had acute hypoxic respiratory failure and was intubated. Possible PE appreciated on CT PE 11/9. CXR s/f opacification R lung, hemithorax. Currently remains intubated on ventilator. -->  - Plan to bronch   - iEpo 0.05  - Repeat CT PE when able   - F/u post op CXR  - Wean FiO2 maintaining SpO2 >92%.       GI: OG in place.-->  - KUB to check placement   - Continue PPI   - NPO, will perform bedside swallow eval post extubation   - Colace/senna BID and miralax BID        : No history of renal disease, baseline creatinine 1.1. CHRIS-oliguric. Lowe in place and making adequate UOP. -->  - Continue lowe catheter for strict I/Os.  - Goal UOP 0.5ml/kg/hr  - RFP as clinically indicated  - Replete electrolytes per CTICU protocol  - Bicarb infusion at 75mL/hr     ENDO:  PMH of T2DM and hypothyroidism. A1c: 6.7-->  - Maintain BG <180, insulin per CTICU protocol  - Insulin ggt  - hold home: metformin.     HEME:  Hx of venous thrombus. Pt on plavix at home.  C/f PE on CT-->    - Monitor drain output volume and characteristics  - CBC, coags, and fibrinogen post op and as  clinically indicated  - On heparin ggt  - SCDs for DVT prophylaxis.  - Last type and screen: 11/11     ID:  Leukocytosis. MRSA negative. Respiratory culture from 11/8 s/f gram positive cocci, pairs and chains-->  - Vancomycin, Meropenem, Fluconazole  - Trend temp q4h  - Resp cx from bronch, Resp panel, MRSA, Blood cx pending      Skin:  No active skin issues.  - preventative Mepilex dressings in place on sacrum and heels  - change preventative Mepilex weekly or more frequently as indicated (when moist/soiled)   - every shift skin assessment per nursing and weekly ICU skin rounds  - moisture barrier to be applied with nannette care  - active skin problems addressed with nursing on daily rounds     Proph:  SCDs  PPI  Heparin ggt     G:  Line  Left IJ triple lumen placed 11/10  Left axillary a-line placed 11/10    F: Family: will update at bedside postoperatively.     A,B,C,D,E,F,G: reviewed    Mely Adkins MD  PGY-4/ CA-3  Dept. of Anesthesiology and Perioperative Medicine      Dispo: CTICU care for now.    CTICU TEAM PHONE [phone]

## 2024-11-11 NOTE — CARE PLAN
The patient's goals for the shift include    Non verbal  The clinical goals for the shift include Decrease vasoactive drips     Over the shift, the patient did not make progress toward the following goals. Barriers to progression include hemodynamic instability. Recommendations to address these barriers include IV Boluses.

## 2024-11-11 NOTE — PROGRESS NOTES
CTICU Progress Note  Carl Vazquez/[id number]    Admit Date: 11/10/2024  Hospital Length of Stay: 1   ICU Length of Stay: 17h   CT SURGEON: Dr. Cisneros    SUBJECTIVE:   Arrived to us with right sided white out on cxr. Bronched done by Dr. Walker.   High dose vasopressors    MEDICATIONS  Infusions:  amiodarone, Last Rate: 0.5 mg/min (11/11/24 1600)  angiotensin II (Giapreza) 2.5 mg in sodium chloride 0.9% 250 mL (0.01 mg/mL) infusion, Last Rate: 20 ng/kg/min (11/11/24 1600)  cisatracurium, Last Rate: 1.75 mcg/kg/min (11/11/24 1600)  EPINEPHrine, Last Rate: 0.1 mcg/kg/min (11/11/24 1600)  epoprostenol, Last Rate: 0.05 mcg/kg/min (11/11/24 0750)  fentaNYL, Last Rate: 75 mcg/hr (11/11/24 1600)  [Held by provider] heparin, Last Rate: 1,300 Units/hr (11/11/24 1600)  insulin regular, Last Rate: 6.7 Units/hr (11/11/24 1600)  lactated Ringer's, Last Rate: 5 mL/hr (11/11/24 1600)  lactated Ringer's, Last Rate: 20 mL/hr (11/11/24 1600)  norepinephrine, Last Rate: 0.04 mcg/kg/min (11/11/24 1600)  propofol, Last Rate: 50 mcg/kg/min (11/11/24 1600)  vasopressin, Last Rate: 0.03 Units/min (11/11/24 1500)      Scheduled:  [Held by provider] acetaminophen, 650 mg, q4h   Or  [Held by provider] acetaminophen, 650 mg, q4h  [Held by provider] aspirin, 81 mg, Daily  aspirin, 150 mg, Daily  fluconazole, 200 mg, q24h  hydrocortisone sodium succinate, 50 mg, q6h  meropenem, 1 g, q12h  oxygen, , Continuous - Inhalation  pantoprazole, 40 mg, Daily before breakfast   Or  pantoprazole, 40 mg, Daily before breakfast  [Held by provider] polyethylene glycol, 17 g, Daily  sennosides-docusate sodium, 2 tablet, BID  tranexamic acid, 500 mg, q8h  vancomycin, 500 mg, Once      PRN:  calcium gluconate, 1 g, q6h PRN  calcium gluconate, 2 g, q6h PRN  dextrose 10 % in water (D10W), 20 mL/hr, PRN  dextrose, 10-50 mL, q15 min PRN  glucagon HCL, 1 mg, q15 min PRN  heparin, 2,000-4,000 Units, q4h PRN  HYDROmorphone, 0.2 mg, q15 min PRN  magnesium sulfate, 2 g,  "q6h PRN  magnesium sulfate, 4 g, q6h PRN  naloxone, 0.2 mg, q5 min PRN  potassium chloride, 20 mEq, q6h PRN  potassium chloride, 40 mEq, q6h PRN  vancomycin, , Daily PRN        PHYSICAL EXAM:   Visit Vitals  BP 85/59   Pulse 107   Temp 36.6 °C (97.9 °F) (Tympanic)   Resp 10   Ht 1.803 m (5' 10.98\")   Wt 72.4 kg (159 lb 9.8 oz)   SpO2 95%   BMI 22.27 kg/m²   Smoking Status Never   BSA 1.9 m²     Wt Readings from Last 5 Encounters:   11/11/24 72.4 kg (159 lb 9.8 oz)   11/10/24 72.4 kg (159 lb 9.8 oz)   10/31/24 79.4 kg (175 lb)   10/07/24 79.8 kg (176 lb)   10/07/24 79.4 kg (175 lb)     INTAKE/OUTPUT:  I/O last 3 completed shifts:  In: 2440.5 [I.V.:1354.8; Blood:500; IV Piggyback:585.8]  Out: 230 [Urine:230]         Vent settings:  Vent Mode: Pressure control/assist control  FiO2 (%):  [50 %-100 %] 80 %  S RR:  [10-22] 12  S VT:  [450 mL-480 mL] 480 mL  PEEP/CPAP (cm H2O):  [5 cm H20-12 cm H20] 10 cm H20  PIP Set (cm H2O):  [32 cm H2O] 32 cm H2O  MAP (cm H2O):  [12-17] 17    Physical Exam:   - CONSTITUTION: Critically ill male in ICU bed intubated and sedated.   - NEUROLOGIC: ODESSA, on anesthesia. Pupils equal and reactive  - CARDIOVASCULAR: Afib in the low 100's  - RESPIRATORY: minimal breath sounds. Difficulty ventilating.   - GI: Soft nondistended  - : clear yellow urine in lowe  - EXTREMITIES: generalized edema, pulses palpable  - SKIN: warm  - PSYCHIATRIC: ODESSA    Daily Risk Screen  Intubated:Yes, hemodynamic instability  Central line: Yes, vasoactive medications  Lowe: Yes, critical I/O    Images: Right chest white out.     Invasive Hemodynamics:    Most Recent Range Past 24hrs   BP (Art) 89/67 Arterial Line BP 1  Min: 7/-4  Max: 129/63   MAP(Art) 73 mmHg Arterial Line MAP 1 (mmHg)   Min: 0 mmHg  Max: 100 mmHg     Assessment/Plan   Assessment:  Carl CHAIREZ II George is a 59 y.o. year old male patient with Past Medical History of CABG x 4 with pulmonary quick location/AV valve replacement, history of thoracotomy " with pleurodesis for recurrent pleural effusions, NSTEMI 2020 with PCI to RCA due to RCA graft failing, patient is on antibiotics for leukocytosis, history of Hodgkin's lymphoma greater than 40 years ago, hypertension, type II DM, history of venous thrombus, and hypertension presented to [facility] emergency department for shortness of breath that has been ongoing for the previous month.Course c/b hypoxic respiratory failure necessitating intubation and hemodynamic instability.Given patient's unstable status in setting of cardiogenic (acutely reduced EF and RV function) vs septic shock vs obstructive (potential PE), shock call placed and decision made to cannulate patient for VA ECMO. Patient now presenting s/p percutaneous left femoral VA ECMO with reperfusion cannula with Dr. Agarwal.      Plan:  NEURO:  No Pmhx. Patient is intubated and sedated on propofol infusion. Paralyzed on cisatricurium gtt to help with ventilation -->  - EEG ordered for abnormal leg jerking movement- no evidence of seizure activity, only diffuse encephalopathy  - Serial neuro and pain assessments   - Continue propofol and fentanyl gtt while paralyzed  - Scheduled Tylenol   - PRN oxycodone  - PRN dilaudid for pain   - PT Consult  - CAM ICU score qshift  - Sleep/wake cycle hygiene  - Hold home Paroxetine     CV:  Patient has a history of CABG x 4 with pulmonary quick location/AV valve replacement, NSTEMI 2020 with PCI to RCA due to RCA graft failing, HTN Is now status post VA ECMO cannulation 11/10 Flows 5L, sweep 6, FIO2 100%. TTE  11/10 s/f EF 20-25%, reduced RV function.  On Epi, levo, vaso, AT2. Lactic acidosis -->  - Epi down to 0.1 and maintain  - wean angiotensin off first  - Plan for impella 5.5 on Wednesday for venting w/ Dr. Cisneros  - Maintain goal MAP >70  - Mixed venous and CI Q4H  - Volume resuscitate as clinically indicated- 1L crystalloid and 500cc albumin today  - Hold home Atorvastatin, lisinopril, torsemide, metoprolol      ECMO:      Most Recent Range Past 24hrs   Flow 4.7 Patient Flow (L/min)  Min: 4.7  Max: 4.85   Speed 3925 Circuit Flow (RPM)  Min: 3925  Max: 3995   Sweep 1 Sweep Gas Flow Rate (L/min)  Min: 1  Max: 2         PULM:  History of thoracotomy with pleurodesis for recurrent pleural effusions.Pt recently presenetd with SOB and was given 2 courses of doxycycline and azithromycin. OSH, pt had acute hypoxic respiratory failure and was intubated. Possible PE appreciated on CT PE 11/9. CXR s/f opacification R lung, hemithorax. Currently remains intubated on ventilator unable to get title volumes. Bronch by CTICU staff concerning for some tissue obstruction. Bronch from Pulm intervention concerning for possible traumatic intubation with fistula-ET tube advanced past fistula. Blood suctioned out of airway -->  - CT scan needed once stable  - iEpo 0.05  - Interventional pulm following  - Daily CXR  - Wean FiO2 maintaining SpO2 >92%.         GI: OG in place. Acute liver shock with elevated LFT -->  - Continue PPI   - RUQ ultrasound  - Daily LFT  - Place corpak tomorrow  - NPO, will perform bedside swallow eval post extubation   - Colace/senna BID and miralax BID     : No history of renal disease, baseline creatinine 1.1. CHRIS-urine output improved with fluid resuscitation.. Lowe in place and making adequate UOP. -->  - Continue lowe catheter for strict I/Os.  - Goal UOP 0.5ml/kg/hr  - RFP as clinically indicated  - Replete electrolytes per CTICU protocol  - Stop bicarb infusion and dont diurese yet.     ENDO:  PMH of T2DM and hypothyroidism. A1c: 6.7 Stress hyperglycemia-->  - Maintain BG <180, insulin per CTICU protocol  - Insulin ggt  - hold home: metformin.     HEME:  Hx of venous thrombus. Pt on plavix at home.  C/f PE on CT. LDH >3000-->    - Monitor drain output volume and characteristics  - Hold heparin gtt in the setting of hemithorax, re-evaluate tomorrow  - CBC, coags, and fibrinogen post op and as clinically  indicated  - On heparin ggt  - SCDs for DVT prophylaxis.  - Last type and screen: 11/11     ID:  Leukocytosis as high as 63- concern for malignancy?? MRSA positive on 11/11. Respiratory culture from 11/8 s/f gram positive cocci, pairs and chains-->  - Vancomycin, Meropenem, Fluconazole  - Trend temp q4h  - Resp cx from bronch, Resp panel     Skin:  No active skin issues.  - preventative Mepilex dressings in place on sacrum and heels  - change preventative Mepilex weekly or more frequently as indicated (when moist/soiled)   - every shift skin assessment per nursing and weekly ICU skin rounds  - moisture barrier to be applied with nannette care  - active skin problems addressed with nursing on daily rounds     Proph:  SCDs  PPI  Subcutaneous heparin     G:  Line  Left IJ triple lumen placed 11/10  Right axillary a-line placed 11/11  Baugh 11/11     F: Family: will update at bedside postoperatively.     A,B,C,D,E,F,G: reviewed        Restraints: The indications and risks/benefits of non-violent/non self-destructive restraints were discussed and are indicated for the safety of the patient.    Code status: Full Code  Emergency contact: [email or web address]       personally spent  70 minutes of critical care time directly and personally managing the patient exclusive of separately billable procedures.     A,B,C,D,E,F,G: reviewed     Dispo: CTICU care for now.    CTICU TEAM PHONE [phone]

## 2024-11-11 NOTE — CONSULTS
Reason For Consult  Hemoptysis     History Of Present Illness  Carl Vazquez is a 59 y.o. year old male patient with Past Medical History of CABG x 4 with pulmonary quick location/AV valve replacement, history of thoracotomy with pleurodesis for recurrent pleural effusions, NSTEMI 2020 with PCI to RCA due to RCA graft failing, patient is on antibiotics for leukocytosis, history of Hodgkin's lymphoma greater than 40 years ago, hypertension, type II DM, history of venous thrombus, and hypertension presented to [facility] emergency department for shortness of breath that has been ongoing for the previous month.Course c/b hypoxic respiratory failure necessitating intubation and hemodynamic instability.Given patient's unstable status in setting of cardiogenic (acutely reduced EF and RV function) vs septic shock vs obstructive (potential PE), shock call placed and decision made to cannulate patient for VA ECMO. Patient now presenting s/p percutaneous left femoral VA ECMO with reperfusion cannula with Dr. Agarwal.     Pulmonary consulted for hemoptysis iso of acutely developed mass-like tracheobronchial protrusion following bronchoscopy overnight 11/10.       Past Medical History  He has a past medical history of Aortic valve disorder (01/06/2015), Basal cell carcinoma (01/22/2024), Chest pain (01/22/2024), Chronic systolic heart failure (01/22/2024), Coronary arteriosclerosis (01/22/2024), Essential hypertension (01/22/2024), Hodgkin lymphoma (01/22/2024), replacement of aortic valve (01/22/2024), Mixed hyperlipidemia (02/23/2005), Pleural effusion (01/22/2024), Shortness of breath (01/22/2024), Type 2 diabetes mellitus without complication (Multi) (08/02/2023), and Venous thrombosis (01/22/2024).    Surgical History  He has a past surgical history that includes CT guided chest tube placement (7/25/2018).     Social History  He reports that he has never smoked. He has never been exposed to tobacco smoke. He has never  "used smokeless tobacco. He reports current alcohol use of about 6.0 standard drinks of alcohol per week. He reports that he does not use drugs.    Family History  Family History   Problem Relation Name Age of Onset    No Known Problems Mother      No Known Problems Father          Allergies  Ativan [lorazepam] and Phenobarbital    Review of Systems  Unable to obtain     Physical Exam  Sedated, paralyzed  Invasive mechanical ventilation  High-dose multi-pressor dependent shock   ECMO   Bloody secretions, in-line blood      Last Recorded Vitals  Blood pressure 85/59, pulse 106, temperature 36.6 °C (97.9 °F), resp. rate 10, height 1.803 m (5' 10.98\"), weight 72.4 kg (159 lb 9.8 oz), SpO2 97%.    Relevant Results  Reviewed      Assessment/Plan   59yoM admitted to CICU for AHRF and circulatory failure requiring VA-ECMO.   Pulmonary medicine consulted for hemoptysis iso tracheobronchial lesion.     PMHx  Coronary artery disease c/b NSTEMI s/p PCI to RCA,  CABGx4    Hypertension   Pleural effusions c/b recurrence s/p thoracotomy with pleurodesis   Deep vein thrombosis   Hodgkin's lymphoma   Diabetes Mellitus Type II     In brief, patient with acute hypoxic respiratory failure [Type 1, Type 4] and undifferentiated circulatory shock [cardiogenic iso RV failure 2/2 pulmonary embolism [PESI High Risk, MAYCOL stage high risk v. Septic 2/2 pneumonia possibly complicated para-pneumonic effusion, etc. ]. Bronchoscopy performed demonstrating, what appears to be, acute tracheobronchial lesion following overnight bronchoscopy warranting interventional pulmonary medicine evaluation.     Plan  Appreciate Interventional Pulmonology recommendations   Otherwise, rest per primary     Thank you for involving pulmonary medicine in the care of Mr. Vazquez  At this juncture, pulmonary medicine to sign-off  Please re-engage if further inquiry     Case to be discussed with Dr. Mirtha Wong MD    "

## 2024-11-11 NOTE — PROCEDURES
Arterial Puncture/Cannulation    Date/Time: 11/11/2024 6:40 AM    Performed by: Mely Adkins MD  Authorized by: Derrell Walker DO  Consent: The procedure was performed in an emergent situation.  Risks and benefits: risks, benefits and alternatives were discussed  Relevant documents: relevant documents present and verified  Test results: test results available and properly labeled  Imaging studies: imaging studies available  Required items: required blood products, implants, devices, and special equipment available  Preparation: Patient was prepped and draped in the usual sterile fashion.  Local anesthesia used: no    Anesthesia:  Local anesthesia used: no    Sedation:  Patient sedated: yes  Sedation type: moderate (conscious) sedation  Sedatives: propofol  Vitals: Vital signs were monitored during sedation.    Patient tolerance: patient tolerated the procedure well with no immediate complications  Comments: L axillary arterial line rewired without complication. Good blood return and flush.

## 2024-11-11 NOTE — DISCHARGE SUMMARY
Discharge Diagnosis  Undifferentiate Shock with biventricular failure     Issues Requiring Follow-Up  Shock   CHRIS anuric  Respiratory Failure       Test Results Pending At Discharge  Pending Labs       Order Current Status    VERIFY ABO/Rh Group Test Collected (11/10/24 1940)    Coagulation Screen In process    Comprehensive metabolic panel In process    Extra Tubes In process    Fibrinogen In process    Hemoglobin A1c In process    Magnesium In process    Phosphorus In process    Procalcitonin In process    Respiratory Culture/Smear In process    SST TOP In process    Staphylococcus Aureus/MRSA Colonization, Culture In process    Troponin I, High Sensitivity In process    Type and screen In process    Blood Culture Preliminary result    Blood Culture Preliminary result    CBC and Auto Differential Preliminary result    Respiratory Culture/Smear Preliminary result            Hospital Course  Carl Vazquez is a 59 y.o. year old male patient with Past Medical History of CABG x 4 with R pulmonary decortication/AV valve replacement, history of thoracotomy with pleurodesis for recurrent pleural effusions, NSTEMI 2020 with PCI to RCA due to RCA graft failing, patient is on antibiotics for leukocytosis, history of Hodgkin's lymphoma greater than 40 years ago, hypertension, type II DM, history of venous thrombus, and hypertension presented to [facility] emergency department for shortness of breath that has been ongoing for the previous month.  Patient completed 2 courses of antibiotics doxycycline and azithromycin he initially started to feel better, but returned significantly worse.  Per wife patient has been sick since October 7, 2024.     Hospital Course:   Patient was admitted to hospital service on heparin infusion.  Patient was noted to be on 2L nasal cannula alert and oriented by multiple services on 11/10/2024.  Patient then developed severe respiratory distress around 2 PM.  And transferred to the ICU.   Patient's initial ABG showed lactic acidosis with a lactate of 9.5. Patient was then transferred to the ICU.     Interval ICU Events:  11/10: Patient transferred to ICU for significant respiratory failure and lactic acidosis with hemodynamic compromise. Stat echo ordered. Patient immediately intubated upon arrival, vasopressor therapy started, amps of bicarb were given.  PERT team called most likely require shock team at this time.   Addendum: Patient was accepted by shock team.  ECMO team will come to [facility] ICU for cannulation of the patient and then patient will be transferred to CTICU [facility].    Pertinent Physical Exam At Time of Discharge  Heart Rate:  []   Temp:  [36.7 °C (98.1 °F)-37.5 °C (99.5 °F)]   Resp:  [0-49]   BP: ()/()   Weight:  [72.4 kg (159 lb 9.8 oz)]   SpO2:  [58 %-100 %]      Physical Exam  Vitals and nursing note reviewed.   Constitutional:       General: He is in acute distress.      Appearance: He is ill-appearing and toxic-appearing.      Interventions: He is intubated and restrained.   HENT:      Nose: Nose normal. No congestion.      Mouth/Throat:      Mouth: Mucous membranes are dry.      Pharynx: No oropharyngeal exudate.   Eyes:      General: No scleral icterus.     Pupils: Pupils are equal, round, and reactive to light.   Cardiovascular:      Rate and Rhythm: Regular rhythm. Tachycardia present.      Pulses:           Radial pulses are 0 on the right side and 0 on the left side.        Femoral pulses are 1+ on the right side and 1+ on the left side.     Heart sounds: Murmur heard.      Comments: Bedside Cardiac POCUS: LV:RV ration 1:2, Estimated a4 EF 30%, VTI 10, IVC dilated non collapsible   Pulmonary:      Effort: He is intubated.      Comments: Pt is intubated   Abdominal:      General: Abdomen is flat. There is no distension.      Tenderness: There is no abdominal tenderness.   Musculoskeletal:         General: No swelling. Normal range of motion.      Right  lower leg: No edema.      Left lower leg: No edema.   Skin:     Capillary Refill: Capillary refill takes more than 3 seconds.      Coloration: Skin is pale.      Findings: Bruising present.   Neurological:      General: No focal deficit present.      Mental Status: He is lethargic.   Psychiatric:      Comments: Unable to assess     Home Medications     Medication List      ASK your doctor about these medications     albuterol 90 mcg/actuation inhaler; Commonly known as: Ventolin HFA;   Inhale 2 puffs every 4 hours if needed for wheezing or shortness of   breath.   aspirin 81 mg EC tablet   atorvastatin 80 mg tablet; Commonly known as: Lipitor; Take 1 tablet (80   mg) by mouth once daily.   azithromycin 250 mg tablet; Commonly known as: Zithromax; Take 2 tablets   (500 mg) by mouth once daily for 1 day, THEN 1 tablet (250 mg) once daily   for 4 days. Take 2 tabs (500 mg) by mouth today, than 1 daily for 4   days..; Start taking on: October 31, 2024; Ask about: Should I take this   medication?   clopidogrel 75 mg tablet; Commonly known as: Plavix; TAKE 1 TABLET DAILY   hydrOXYzine pamoate 25 mg capsule; Commonly known as: VistariL; Take 1   capsule (25 mg) by mouth 3 times a day as needed for anxiety for up to 10   days.   levothyroxine 25 mcg tablet; Commonly known as: Synthroid, Levoxyl; TAKE   1 TABLET DAILY IN THE MORNING ON AN EMPTY STOMACH   lisinopril 5 mg tablet   metFORMIN (OSM) 500 mg 24 hr tablet; Commonly known as: Fortamet   metoprolol tartrate 25 mg tablet; Commonly known as: Lopressor; TAKE 1   TABLET TWICE A DAY WITH FOOD   multivitamin tablet   pantoprazole 40 mg EC tablet; Commonly known as: ProtoNix; Take 1 tablet   (40 mg) by mouth once daily in the morning. Take before meals.   PARoxetine 10 mg tablet; Commonly known as: Paxil; TAKE 1 TABLET DAILY   IN THE MORNING   promethazine-DM 6.25-15 mg/5 mL syrup; Commonly known as: Phenergan-DM;   Take 5 mL by mouth every 4 hours if needed for cough.    sildenafil 50 mg tablet; Commonly known as: Viagra; Take 1 tablet (50   mg) by mouth if needed for erectile dysfunction for up to 60 doses.   torsemide 100 mg tablet; Commonly known as: Demadex       Outpatient Follow-Up  Future Appointments   Date Time Provider Department [place]   11/11/2024  8:00 AM MICHAEL GI ADD-ONS [id number] [facility]   12/11/2024  8:45 AM Sage Cedillo MD MPH [id number] [facility]   1/27/2025  3:45 PM Andrea Hong MD [id number] [facility]       Patient being transferred to [facility] CTICU post ECMO cannulation    This note was prepared using voice recognition software. The details of this note are correct and have been reviewed, and corrected to the best of my ability. Some grammatical areas may persist related to the Dragon software.     I have reviewed all medications, laboratory results, and imaging pertinent for today's encounter.  Plan Discussed with Dr. Frey   Critical Care [facility]   Dean Dorantes PA-C

## 2024-11-11 NOTE — PROGRESS NOTES
DAILY ECMO PROGRESS NOTE     Patient requires ECMO support. Drainage cannula site, cannula, pump, oxygenator, return cannula and return cannula site clinically inspected. RPM and sweep reviewed and adjusted appropriate to clinical context. Anticoagulation status and intensity discussed. Plan for weaning, next clinical steps discussed with team and family. Discussed with cardiothoracic surgeon, perfusion, palliative care and physical/occupational therapy    ECMO Indication: Cardiogenic shock  ECMO Cannula Configuration: Left fem-fem VA   ECMO circuit run from drainage cannula to pump to oxygenator to return cannula: yes  Presence of cannula bleeding: no  Presence of oxygenator clot: no  Pre/post PaO2 adequate: yes  LV Unloading/Pulse Pressure: present  Distal Perfusion: yes  Anticoagulation Plan/Monitoring: heparin gtt   Mobility Plan/Candidacy: not currently  Path to decannulation/anticipated decannulation date: TBD  ECMO:     Most Recent Range Past 24hrs   Flow 4.85 Patient Flow (L/min)  Min: 4.62  Max: 4.85   Speed 3925 Circuit Flow (RPM)  Min: 3925  Max: 3995   Sweep 1 Sweep Gas Flow Rate (L/min)  Min: 1  Max: 2       amiodarone, 1 mg/min, Last Rate: 1 mg/min (11/11/24 0600)   Followed by  amiodarone, 0.5 mg/min  angiotensin II (Giapreza) 2.5 mg in sodium chloride 0.9% 250 mL (0.01 mg/mL) infusion, 1.25-40 ng/kg/min, Last Rate: 40 ng/kg/min (11/11/24 0600)  EPINEPHrine, 0-2 mcg/kg/min, Last Rate: 0.15 mcg/kg/min (11/11/24 0600)  epoprostenol, 0.05 mcg/kg/min (Ideal), Last Rate: 0.05 mcg/kg/min (11/11/24 0455)  heparin, 0-4,000 Units/hr, Last Rate: 1,300 Units/hr (11/11/24 0600)  insulin regular, 0-30 Units/hr, Last Rate: 3.9 Units/hr (11/11/24 0600)  lactated Ringer's, 50 mL/hr, Last Rate: 50 mL/hr (11/11/24 0600)  norepinephrine, 0-1 mcg/kg/min, Last Rate: 0.08 mcg/kg/min (11/11/24 0600)  propofol, 0-50 mcg/kg/min, Last Rate: 40 mcg/kg/min (11/11/24 0600)  sodium bicarbonate, 75 mL/hr, Last Rate: 75 mL/hr  (11/11/24 0600)  vasopressin, 0-0.06 Units/min, Last Rate: 0.03 Units/min (11/11/24 0600)          Vent Mode: Volume control/assist control  FiO2 (%):  [50 %-100 %] 50 %  S RR:  [15-22] 16  S VT:  [450 mL-500 mL] 480 mL  PEEP/CPAP (cm H2O):  [5 cm H20-8 cm H20] 8 cm H20  MAP (cm H2O):  [10-17] 17      I, as the attending critical care physician, have personally reviewed the recent patient history, physical exam, vital signs, significant labs, medications, imaging, and ECMO settings, oxygenator, and flows of this critically ill patient. Using this information I have and will continue to actively manage this critically ill patient's extracorporeal membrane oxygenation (ECMO)/extracorporeal life support (ECLS).

## 2024-11-11 NOTE — SIGNIFICANT EVENT
Bedside bronchoscopy completed.  Full note to follow.  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  There are concerns for an extensive fistula/multiple fistulas.  The ET tube has been position to 1.5 cm above the navid, see media tab for images.    Latest chest radiograph post procedure shows return of aeration to the left upper lobe.    Recommendations discussed with [facility] anesthesiology attending and cardiac surgery (Dr. Patt Cisneros).     I updated a large group of family members, including the patient's healthcare power of , his wife.  All questions answered.    Full note to follow.    Wm Garcia MD  Staff Physician - Interventional Pulmonology  6:19 PM  11/11/24

## 2024-11-11 NOTE — CONSULTS
Inpatient consult to Palliative Care  Consult performed by: Corbin Moe MD  Consult ordered by: Pj Manley MD, MS  Reason for consult: ECMO and family support        Palliative Medicine Consult  Complex medical decision making, symptom management, patient/family support    History obtained from chart review including ED note, H&P, patient's daily progress notes, review of lab/test results, and discussion with primary team and bedside RN.    Subjective    History of Present Illness    The patient is a 59 years old male with known history of coronary artery bypass graft x 4, status post AV valve replacement, history of thoracotomy with pleurodesis for recurrent pleural effusions, non-ST segment elevation microinfarction in 2020 status post PCI to RCA due to failing RCA graft, Hodgkin's lymphoma (40 years), hypertension, diabetes mellitus type 2, and history of venous thrombosis.    He has been having shortness of breath for about a month and received antibiotic treatments for pneumonia.  He initially presented at [facility] with respiratory distress.  He required endotracheal intubation with mechanical ventilation due to hypoxic respiratory failure and hemodynamic instability.  He has acutely reduced ejection fraction and right ventricular function with concern for septic shock versus obstructive potential PE.  He was subsequently cannulated for VA ECMO.      CT angiogram of the chest indicated extensive and progressive consolidative opacities involving the right perihilar region, right middle lobe, and right lower lobe.  There is concern for pneumonia with superimposed malignancy.  There was an ovoid consolidative focus laterally in the right lower and middle lobes appears to demonstrate areas of internal complex fluid density.      Introduction to Palliative Medicine  I have seen and examined the patient in CT ICU.  I met with the patient's spouse, Adamaris, their son, and other family  members in the CTICU family room.    Patient remains altered, does not have capacity to make their own medical decisions at this time. Unable to participate in ROS or goals of care discussion.     Surrogate decision maker is Adamaris Vazquez (Spouse)  [phone] (Home Phone).    Staff present: Abbi VIRK and Dr. Sharlene Bills.    Palliative Medicine was introduced as a specialty service for patients with serious illness to help with symptom management, improve quality of life, assist with goals of care conversations, navigate complex decision making, and provide support to patients and families. Support and empathy was provided throughout the encounter. Provided reflective listening and presence.     Symptoms  The patient is unable to participate with history and review of systems due to the clinical status.    Palliative Medicine Social History:  We were unable to complete additional history due to family distress.  We provided support. They requested to have time amongst themselves for now.    Objective    Last Recorded Vitals  Pulse (!) 113   Temp 36.7 °C (98.1 °F)   Resp 10   SpO2 97%      Physical Exam  Constitutional:       Appearance: He is ill-appearing and toxic-appearing.      Interventions: He is intubated.   Cardiovascular:      Rate and Rhythm: Regular rhythm.   Pulmonary:      Effort: He is intubated.      Comments: Decreased breath sounds.  Skin:     General: Skin is dry.   Neurological:      Mental Status: He is unresponsive.        Relevant Results  Results for orders placed or performed during the hospital encounter of 11/10/24 (from the past 24 hours)   Calcium, Ionized   Result Value Ref Range    POCT Calcium, Ionized 1.09 (L) 1.1 - 1.33 mmol/L   Magnesium   Result Value Ref Range    Magnesium 2.49 (H) 1.60 - 2.40 mg/dL   Coagulation Screen   Result Value Ref Range    Protime 26.7 (H) 9.8 - 12.8 seconds    INR 2.3 (H) 0.9 - 1.1    aPTT 103 (HH) 27 - 38 seconds   Fibrinogen   Result  Value Ref Range    Fibrinogen 301 200 - 400 mg/dL   CBC   Result Value Ref Range    WBC 53.3 (HH) 4.4 - 11.3 x10*3/uL    nRBC 0.2 (H) 0.0 - 0.0 /100 WBCs    RBC 3.66 (L) 4.50 - 5.90 x10*6/uL    Hemoglobin 11.8 (L) 13.5 - 17.5 g/dL    Hematocrit 35.3 (L) 41.0 - 52.0 %    MCV 96 80 - 100 fL    MCH 32.2 26.0 - 34.0 pg    MCHC 33.4 32.0 - 36.0 g/dL    RDW 15.9 (H) 11.5 - 14.5 %    Platelets 259 150 - 450 x10*3/uL   Renal Function Panel   Result Value Ref Range    Glucose 201 (H) 74 - 99 mg/dL    Sodium 143 136 - 145 mmol/L    Potassium 2.9 (LL) 3.5 - 5.3 mmol/L    Chloride 92 (L) 98 - 107 mmol/L    Bicarbonate 15 (L) 21 - 32 mmol/L    Anion Gap 39 (H) 10 - 20 mmol/L    Urea Nitrogen 25 (H) 6 - 23 mg/dL    Creatinine 1.98 (H) 0.50 - 1.30 mg/dL    eGFR 38 (L) >60 mL/min/1.73m*2    Calcium 9.1 8.6 - 10.6 mg/dL    Phosphorus 6.4 (H) 2.5 - 4.9 mg/dL    Albumin 2.2 (L) 3.4 - 5.0 g/dL   Lactate Dehydrogenase   Result Value Ref Range    LDH 3,982 (H) 84 - 246 U/L   Hepatic function panel   Result Value Ref Range    Albumin 2.1 (L) 3.4 - 5.0 g/dL    Bilirubin, Total 3.1 (H) 0.0 - 1.2 mg/dL    Bilirubin, Direct 2.1 (H) 0.0 - 0.3 mg/dL    Alkaline Phosphatase 211 (H) 33 - 120 U/L     (H) 10 - 52 U/L    AST 2,398 (H) 9 - 39 U/L    Total Protein 4.9 (L) 6.4 - 8.2 g/dL   Blood Gas Arterial Full Panel   Result Value Ref Range    POCT pH, Arterial 7.32 (L) 7.38 - 7.42 pH    POCT pCO2, Arterial 20 (L) 38 - 42 mm Hg    POCT pO2, Arterial 108 (H) 85 - 95 mm Hg    POCT SO2, Arterial 100 94 - 100 %    POCT Oxy Hemoglobin, Arterial 97.0 94.0 - 98.0 %    POCT Hematocrit Calculated, Arterial 37.0 (L) 41.0 - 52.0 %    POCT Sodium, Arterial 135 (L) 136 - 145 mmol/L    POCT Potassium, Arterial 2.8 (LL) 3.5 - 5.3 mmol/L    POCT Chloride, Arterial 93 (L) 98 - 107 mmol/L    POCT Ionized Calcium, Arterial 1.12 1.10 - 1.33 mmol/L    POCT Glucose, Arterial 231 (H) 74 - 99 mg/dL    POCT Lactate, Arterial >17.0 (HH) 0.4 - 2.0 mmol/L    POCT Base  Excess, Arterial -13.7 (L) -2.0 - 3.0 mmol/L    POCT HCO3 Calculated, Arterial 10.3 (L) 22.0 - 26.0 mmol/L    POCT Hemoglobin, Arterial 12.2 (L) 13.5 - 17.5 g/dL    POCT Anion Gap, Arterial 35 (H) 10 - 25 mmo/L    Patient Temperature 37.0 degrees Celsius    FiO2 100 %   Type and Screen   Result Value Ref Range    ABO TYPE A     Rh TYPE POS     ANTIBODY SCREEN NEG    Hepatic function panel   Result Value Ref Range    Albumin 2.1 (L) 3.4 - 5.0 g/dL    Bilirubin, Total 3.0 (H) 0.0 - 1.2 mg/dL    Bilirubin, Direct 2.0 (H) 0.0 - 0.3 mg/dL    Alkaline Phosphatase 180 (H) 33 - 120 U/L     (H) 10 - 52 U/L    AST 2,238 (H) 9 - 39 U/L    Total Protein 4.7 (L) 6.4 - 8.2 g/dL   Heparin Assay   Result Value Ref Range    Heparin Unfractionated 0.2 See Comment Below for Therapeutic Ranges IU/mL   ECMO Arterial Full Panel Unsolicited   Result Value Ref Range    POCT pH, Arterial ECMO 7.26 Reference range not established pH    POCT pCO2, Arterial ECMO 23 Reference range not established mm Hg    POCT pO2,  Arterial ECMO 422 Reference range not established mm Hg    POCT SO2, Arterial ECMO 100 Reference range not established %    POCT Oxy Hemoglobin, Arterial ECMO 98.3 Reference range not established %    POCT Hematocrit Calculated, Arterial ECMO 34.0 (L) 41.0 - 52.0 %    POCT Sodium, Arterial  ECMO 133 (L) 136 - 145 mmol/L    POCT Potassium, Arterial  ECMO 3.2 (L) 3.5 - 5.3 mmol/L    POCT Chloride, Arterial  ECMO 94 (L) 98 - 107 mmol/L    POCT Ionized Calcium, Arterial  ECMO 0.99 (L) 1.10 - 1.33 mmol/L    POCT Glucose, Arterial  ECMO 195 (H) 74 - 99 mg/dL    POCT Lactate Arterial  ECMO >17.0 (HH) 0.4 - 2.0 mmol/L    POCT Base Excess, Arterial ECMO -15.0 Reference range not established mmol/L    POCT HCO3 Calculated, Arterial ECMO 10.3 Reference range not established mmol/L    POCT Hemoglobin, Arterial  ECMO 11.2 (L) 13.5 - 17.5 g/dL    POCT Anion Gap, Arterial  ECMO 32 Reference range not established mmo/L    Patient  Temperature 37.0 degrees Celsius    FiO2 100 %   ECMO Venous Full Panel Unsolicited   Result Value Ref Range    POCT pH, Venous ECMO 7.23 Reference range not established pH    POCT pCO2, Venous ECMO 26 Reference range not established mm Hg    POCT pO2,  Venous  ECMO 84 Reference range not established mm Hg    POCT SO2, Venous ECMO 97 Reference range not established %    POCT Oxy Hemoglobin, Venous ECMO 94.5 Reference range not established %    POCT Hematocrit Calculated, Venous ECMO 43.0 41.0 - 52.0 %    POCT Sodium, Venous ECMO 134 (L) 136 - 145 mmol/L    POCT Potassium, Venous ECMO 3.3 (L) 3.5 - 5.3 mmol/L    POCT Chloride, Venous ECMO 91 (L) 98 - 107 mmol/L    POCT Ionized Calcium, Venous ECMO 1.00 (L) 1.10 - 1.33 mmol/L    POCT Glucose, Venous ECMO 204 (H) 74 - 99 mg/dL    POCT Lactate Venous ECMO >17.0 (HH) 0.4 - 2.0 mmol/L    POCT Base Excess, Venous ECMO -14.9 Reference range not established mmol/L    POCT HCO3 Calculated, Venous ECMO 10.9 Reference range not established mmol/L    POCT Hemoglobin, Venous ECMO 14.3 13.5 - 17.5 g/dL    POCT Anion Gap, Venous ECMO 35 Reference range not established mmo/L    Patient Temperature 37.0 degrees Celsius    FiO2 100 %   MRSA Surveillance for Vancomycin De-escalation, PCR    Specimen: Anterior Nares; Swab   Result Value Ref Range    MRSA PCR Detected (A) Not Detected   Type and Screen   Result Value Ref Range    ABO TYPE A     Rh TYPE POS     ANTIBODY SCREEN NEG    Blood Gas Arterial Full Panel   Result Value Ref Range    POCT pH, Arterial 7.25 (LL) 7.38 - 7.42 pH    POCT pCO2, Arterial 32 (L) 38 - 42 mm Hg    POCT pO2, Arterial 468 (H) 85 - 95 mm Hg    POCT SO2, Arterial 100 94 - 100 %    POCT Oxy Hemoglobin, Arterial 99.0 (H) 94.0 - 98.0 %    POCT Hematocrit Calculated, Arterial 31.0 (L) 41.0 - 52.0 %    POCT Sodium, Arterial 134 (L) 136 - 145 mmol/L    POCT Potassium, Arterial 3.3 (L) 3.5 - 5.3 mmol/L    POCT Chloride, Arterial 96 (L) 98 - 107 mmol/L    POCT Ionized  Calcium, Arterial 1.09 (L) 1.10 - 1.33 mmol/L    POCT Glucose, Arterial 240 (H) 74 - 99 mg/dL    POCT Lactate, Arterial >17.0 (HH) 0.4 - 2.0 mmol/L    POCT Base Excess, Arterial -12.1 (L) -2.0 - 3.0 mmol/L    POCT HCO3 Calculated, Arterial 14.0 (L) 22.0 - 26.0 mmol/L    POCT Hemoglobin, Arterial 10.4 (L) 13.5 - 17.5 g/dL    POCT Anion Gap, Arterial 27 (H) 10 - 25 mmo/L    Patient Temperature 37.0 degrees Celsius    FiO2 100 %   Respiratory Culture/Smear    Specimen: BAL; Fluid   Result Value Ref Range    Gram Stain (4+) Abundant Polymorphonuclear leukocytes     Gram Stain No organisms seen    Blood Gas Arterial Full Panel   Result Value Ref Range    POCT pH, Arterial 7.25 (LL) 7.38 - 7.42 pH    POCT pCO2, Arterial 40 38 - 42 mm Hg    POCT pO2, Arterial 440 (H) 85 - 95 mm Hg    POCT SO2, Arterial 100 94 - 100 %    POCT Oxy Hemoglobin, Arterial 98.9 (H) 94.0 - 98.0 %    POCT Hematocrit Calculated, Arterial 35.0 (L) 41.0 - 52.0 %    POCT Sodium, Arterial 134 (L) 136 - 145 mmol/L    POCT Potassium, Arterial 3.4 (L) 3.5 - 5.3 mmol/L    POCT Chloride, Arterial 96 (L) 98 - 107 mmol/L    POCT Ionized Calcium, Arterial 1.11 1.10 - 1.33 mmol/L    POCT Glucose, Arterial 277 (H) 74 - 99 mg/dL    POCT Lactate, Arterial 17.0 (HH) 0.4 - 2.0 mmol/L    POCT Base Excess, Arterial -9.2 (L) -2.0 - 3.0 mmol/L    POCT HCO3 Calculated, Arterial 17.5 (L) 22.0 - 26.0 mmol/L    POCT Hemoglobin, Arterial 11.6 (L) 13.5 - 17.5 g/dL    POCT Anion Gap, Arterial 24 10 - 25 mmo/L    Patient Temperature 37.0 degrees Celsius    FiO2 100 %   Heparin Assay, UFH   Result Value Ref Range    Heparin Unfractionated 0.2 See Comment Below for Therapeutic Ranges IU/mL   POCT GLUCOSE   Result Value Ref Range    POCT Glucose 256 (H) 74 - 99 mg/dL   Calcium, Ionized   Result Value Ref Range    POCT Calcium, Ionized 1.07 (L) 1.1 - 1.33 mmol/L   Magnesium   Result Value Ref Range    Magnesium 2.16 1.60 - 2.40 mg/dL   CBC   Result Value Ref Range    WBC 60.8 (HH)  4.4 - 11.3 x10*3/uL    nRBC 0.2 (H) 0.0 - 0.0 /100 WBCs    RBC 3.12 (L) 4.50 - 5.90 x10*6/uL    Hemoglobin 10.1 (L) 13.5 - 17.5 g/dL    Hematocrit 29.5 (L) 41.0 - 52.0 %    MCV 95 80 - 100 fL    MCH 32.4 26.0 - 34.0 pg    MCHC 34.2 32.0 - 36.0 g/dL    RDW 15.9 (H) 11.5 - 14.5 %    Platelets 248 150 - 450 x10*3/uL   Renal Function Panel   Result Value Ref Range    Glucose 273 (H) 74 - 99 mg/dL    Sodium 141 136 - 145 mmol/L    Potassium 3.1 (L) 3.5 - 5.3 mmol/L    Chloride 93 (L) 98 - 107 mmol/L    Bicarbonate 24 21 - 32 mmol/L    Anion Gap 27 (H) 10 - 20 mmol/L    Urea Nitrogen 27 (H) 6 - 23 mg/dL    Creatinine 2.11 (H) 0.50 - 1.30 mg/dL    eGFR 35 (L) >60 mL/min/1.73m*2    Calcium 8.5 (L) 8.6 - 10.6 mg/dL    Phosphorus 4.2 2.5 - 4.9 mg/dL    Albumin 2.3 (L) 3.4 - 5.0 g/dL   Lactate Dehydrogenase   Result Value Ref Range    LDH 5,710 (H) 84 - 246 U/L   Blood Gas Arterial Full Panel   Result Value Ref Range    POCT pH, Arterial 7.47 (H) 7.38 - 7.42 pH    POCT pCO2, Arterial 26 (L) 38 - 42 mm Hg    POCT pO2, Arterial 107 (H) 85 - 95 mm Hg    POCT SO2, Arterial 99 94 - 100 %    POCT Oxy Hemoglobin, Arterial 97.9 94.0 - 98.0 %    POCT Hematocrit Calculated, Arterial 32.0 (L) 41.0 - 52.0 %    POCT Sodium, Arterial 133 (L) 136 - 145 mmol/L    POCT Potassium, Arterial 3.1 (L) 3.5 - 5.3 mmol/L    POCT Chloride, Arterial 96 (L) 98 - 107 mmol/L    POCT Ionized Calcium, Arterial 1.09 (L) 1.10 - 1.33 mmol/L    POCT Glucose, Arterial 308 (H) 74 - 99 mg/dL    POCT Lactate, Arterial 15.0 (HH) 0.4 - 2.0 mmol/L    POCT Base Excess, Arterial -3.7 (L) -2.0 - 3.0 mmol/L    POCT HCO3 Calculated, Arterial 18.9 (L) 22.0 - 26.0 mmol/L    POCT Hemoglobin, Arterial 10.5 (L) 13.5 - 17.5 g/dL    POCT Anion Gap, Arterial 21 10 - 25 mmo/L    Patient Temperature 37.0 degrees Celsius    FiO2 50 %   POCT GLUCOSE   Result Value Ref Range    POCT Glucose 240 (H) 74 - 99 mg/dL   POCT GLUCOSE   Result Value Ref Range    POCT Glucose 233 (H) 74 - 99  mg/dL   Coagulation Screen   Result Value Ref Range    Protime 28.7 (H) 9.8 - 12.8 seconds    INR 2.5 (H) 0.9 - 1.1    aPTT >200 (HH) 27 - 38 seconds   Heparin Assay, UFH   Result Value Ref Range    Heparin Unfractionated 0.4 See Comment Below for Therapeutic Ranges IU/mL   Blood Gas Arterial Full Panel   Result Value Ref Range    POCT pH, Arterial 7.15 (LL) 7.38 - 7.42 pH    POCT pCO2, Arterial 74 (HH) 38 - 42 mm Hg    POCT pO2, Arterial 277 (H) 85 - 95 mm Hg    POCT SO2, Arterial 100 94 - 100 %    POCT Oxy Hemoglobin, Arterial 98.5 (H) 94.0 - 98.0 %    POCT Hematocrit Calculated, Arterial 33.0 (L) 41.0 - 52.0 %    POCT Sodium, Arterial 134 (L) 136 - 145 mmol/L    POCT Potassium, Arterial 3.8 3.5 - 5.3 mmol/L    POCT Chloride, Arterial 98 98 - 107 mmol/L    POCT Ionized Calcium, Arterial 1.11 1.10 - 1.33 mmol/L    POCT Glucose, Arterial 259 (H) 74 - 99 mg/dL    POCT Lactate, Arterial 10.7 (HH) 0.4 - 2.0 mmol/L    POCT Base Excess, Arterial -4.1 (L) -2.0 - 3.0 mmol/L    POCT HCO3 Calculated, Arterial 25.8 22.0 - 26.0 mmol/L    POCT Hemoglobin, Arterial 10.9 (L) 13.5 - 17.5 g/dL    POCT Anion Gap, Arterial 14 10 - 25 mmo/L    Patient Temperature 37.0 degrees Celsius    FiO2 100 %   Blood Gas Arterial Full Panel   Result Value Ref Range    POCT pH, Arterial 7.37 (L) 7.38 - 7.42 pH    POCT pCO2, Arterial 44 (H) 38 - 42 mm Hg    POCT pO2, Arterial 282 (H) 85 - 95 mm Hg    POCT SO2, Arterial 100 94 - 100 %    POCT Oxy Hemoglobin, Arterial 98.4 (H) 94.0 - 98.0 %    POCT Hematocrit Calculated, Arterial 33.0 (L) 41.0 - 52.0 %    POCT Sodium, Arterial 134 (L) 136 - 145 mmol/L    POCT Potassium, Arterial 3.6 3.5 - 5.3 mmol/L    POCT Chloride, Arterial 95 (L) 98 - 107 mmol/L    POCT Ionized Calcium, Arterial 1.11 1.10 - 1.33 mmol/L    POCT Glucose, Arterial 266 (H) 74 - 99 mg/dL    POCT Lactate, Arterial 10.9 (HH) 0.4 - 2.0 mmol/L    POCT Base Excess, Arterial -0.1 -2.0 - 3.0 mmol/L    POCT HCO3 Calculated, Arterial 25.4 22.0  - 26.0 mmol/L    POCT Hemoglobin, Arterial 10.9 (L) 13.5 - 17.5 g/dL    POCT Anion Gap, Arterial 17 10 - 25 mmo/L    Patient Temperature 37.0 degrees Celsius    FiO2 100 %   Calcium, Ionized   Result Value Ref Range    POCT Calcium, Ionized 1.09 (L) 1.1 - 1.33 mmol/L   CBC   Result Value Ref Range    WBC 63.4 (HH) 4.4 - 11.3 x10*3/uL    nRBC 0.3 (H) 0.0 - 0.0 /100 WBCs    RBC 3.23 (L) 4.50 - 5.90 x10*6/uL    Hemoglobin 10.6 (L) 13.5 - 17.5 g/dL    Hematocrit 31.6 (L) 41.0 - 52.0 %    MCV 98 80 - 100 fL    MCH 32.8 26.0 - 34.0 pg    MCHC 33.5 32.0 - 36.0 g/dL    RDW 16.2 (H) 11.5 - 14.5 %    Platelets 253 150 - 450 x10*3/uL   CBC and Auto Differential   Result Value Ref Range    WBC 63.4 (HH) 4.4 - 11.3 x10*3/uL    nRBC 0.3 (H) 0.0 - 0.0 /100 WBCs    RBC 3.23 (L) 4.50 - 5.90 x10*6/uL    Hemoglobin 10.6 (L) 13.5 - 17.5 g/dL    Hematocrit 31.6 (L) 41.0 - 52.0 %    MCV 98 80 - 100 fL    MCH 32.8 26.0 - 34.0 pg    MCHC 33.5 32.0 - 36.0 g/dL    RDW 16.2 (H) 11.5 - 14.5 %    Platelets 253 150 - 450 x10*3/uL    Immature Granulocytes %, Automated 4.1 (H) 0.0 - 0.9 %    Immature Granulocytes Absolute, Automated 2.55 (H) 0.00 - 0.70 x10*3/uL   Manual Differential   Result Value Ref Range    Neutrophils %, Manual 95.6 40.0 - 80.0 %    Lymphocytes %, Manual 0.9 13.0 - 44.0 %    Monocytes %, Manual 3.5 2.0 - 10.0 %    Eosinophils %, Manual 0.0 0.0 - 6.0 %    Basophils %, Manual 0.0 0.0 - 2.0 %    Seg Neutrophils Absolute, Manual 60.61 (H) 1.20 - 7.00 x10*3/uL    Lymphocytes Absolute, Manual 0.57 (L) 1.20 - 4.80 x10*3/uL    Monocytes Absolute, Manual 2.22 (H) 0.10 - 1.00 x10*3/uL    Eosinophils Absolute, Manual 0.00 0.00 - 0.70 x10*3/uL    Basophils Absolute, Manual 0.00 0.00 - 0.10 x10*3/uL    Total Cells Counted 115     RBC Morphology See Below     RBC Fragments Few     Alysa Cells Many    Transthoracic Echo (TTE) Complete   Result Value Ref Range    LVOT diam 1.80 cm    Tricuspid annular plane systolic excursion 0.8 cm    LV EF  13 %    LVIDd 4.90 cm    RVSP 36.2 mmHg    LV A4C EF 18.9    POCT GLUCOSE   Result Value Ref Range    POCT Glucose 225 (H) 74 - 99 mg/dL   ECMO Venous Blood Gas   Result Value Ref Range    POCT pH, Venous ECMO 7.52 Reference range not established pH    POCT pCO2, Venous ECMO 33 Reference range not established mm Hg    POCT pO2,  Venous  ECMO 56 Reference range not established mm Hg    POCT SO2, Venous ECMO 92 Reference range not established %    POCT Oxy Hemoglobin, Venous ECMO 90.3 Reference range not established %    POCT Base Excess, Venous ECMO 4.3 Reference range not established mmol/L    POCT HCO3 Calculated, Venous ECMO 26.9 Reference range not established mmol/L    Patient Temperature 37.0 degrees Celsius    FiO2 100 %   POCT GLUCOSE   Result Value Ref Range    POCT Glucose 172 (H) 74 - 99 mg/dL   POCT GLUCOSE   Result Value Ref Range    POCT Glucose 226 (H) 74 - 99 mg/dL     *Note: Due to a large number of results and/or encounters for the requested time period, some results have not been displayed. A complete set of results can be found in Results Review.      XR chest 1 view  Narrative: Interpreted By:  Andrew Ha and Omar Mahmoud   STUDY:  XR CHEST 1 VIEW;  11/11/2024 9:43 am      INDICATION:  Signs/Symptoms:respiratory failure.          Per EMR: Patient with recent history of undifferentiated shock and VA  ECMO.      COMPARISON:  Chest x-ray 11/11/2024 12:59 a.m., CT PE 11/07/2024      ACCESSION NUMBER(S):  [id number]      ORDERING CLINICIAN:  SAFIA ROGERS      FINDINGS:  AP radiograph of the chest was provided.      Postoperative findings from median sternotomy with again seen  fracture of the superior-most sternal wire. Endotracheal tube tip  projects 4.1 cm from the navid. Enteric tube tip projects over the  expected location of the proximal gastric body. Esophageal  temperature probe in place. Inferior approach ECMO catheter with tip  projecting over the expected location mid superior  vena cava. Left IJ  approach catheter with tip projecting over the mid superior vena  cava. Clips overlying the left upper quadrant.      CARDIOMEDIASTINAL SILHOUETTE:  Cardiomediastinal silhouette is stable in size and configuration.  LUNGS:  Complete opacification of the right hemithorax without demonstration  of air bronchograms is similar as compared to prior; minimal lung  aeration within the right midlung field on most recent examination is  no longer demonstrated. There is left perihilar interstitial and hazy  opacities, minimally decreased as compared to prior. Stable left  basilar subsegmental atelectasis. There is no evidence for  pneumothorax.      ABDOMEN:  No remarkable upper abdominal findings.      BONES:  No acute osseous changes.      Impression: 1. Similar complete opacification of the right hemithorax. Correlate  with concern for right mainstem bronchus obstruction.  2. Pulmonary interstitial and alveolar edema within the left lung  field is mildly decreased. Left basilar subsegmental atelectasis.      I personally reviewed the images/study and I agree with the findings  as stated by Walt Palacios MD (PGY-2). This study was interpreted at  [facility], [place], [place].      MACRO:  None      Signed by: Andrew Ha 11/11/2024 11:43 AM  Dictation workstation:   [id number]  Transthoracic Echo (TTE) [facility], [address] [place] [address]                 Tel [phone] and Fax [phone]    TRANSTHORACIC ECHOCARDIOGRAM REPORT       Patient Name:       VALERIE CHAIREZ SPENSER ROTHMAN   Reading Physician:    [id number] Emigdio Ibrahim MD  Study Date:         11/11/2024          Ordering Provider:    [id number] AZUCENA LANGLEY  MRN/PID:            [id number]            Fellow:               [id number] Macias                                                                 Rafael DAVIS  Accession#:         [id number]        Nurse:  Date of Birth/Age:  1965 / 59      Sonographer:          Lamin Dawson                      years                                     RDCS, RVT  Gender assigned at  M                   Additional Staff:  Birth:  Height:             180.34 cm           Admit Date:           11/10/2024  Weight:             72.12 kg            Admission Status:     Inpatient - STAT  BSA / BMI:          1.91 m2 / 22.18     Encounter#:           [id number]                      kg/m2  Blood Pressure:     94/71 mmHg          Department Location:  [facility]    Study Type:    TRANSTHORACIC ECHO (TTE) COMPLETE  Diagnosis/ICD: Non ST elevation (NSTEMI) myocardial infarction-I21.4  Indication:    reduced EF, cardiogenic shock  CPT Code:      Echo Complete w Full Doppler-14921    Patient History:  Pertinent History: CABGx4, s/p AVR, NSTEMI 2020, Hodgkins lymphoma, HTN, DM,                     respiratory failure.    Study Detail: The following Echo studies were performed: 2D, M-Mode, Doppler and                color flow. Definity used as a contrast agent for endocardial                border definition. Total contrast used for this procedure was 3 mL                via IV push.       PHYSICIAN INTERPRETATION:  Left Ventricle: Left ventricular ejection fraction is severely decreased, by visual estimate at 10-15%. There is global hypokinesis of the left ventricle with minor regional variations. The left ventricular cavity size is normal. There is normal septal and normal posterior left ventricular wall thickness. Abnormal (paradoxical) septal motion consistent with post-operative status. Left ventricular diastolic filling cannot be determined, due to E/A wave fusion. There is no definite left ventricular thrombus visualized.  Left Atrium: The left atrium is enlarged.  Right Ventricle: The right ventricle was not well visualized. There is  reduced right ventricular systolic function.  Right Atrium: The right atrium was not well visualized.  Aortic Valve: There is a prosthetic aortic valve present. There is mild prosthetic aortic valve regurgitation. There is mild aortic valve regurgitation. There is some degenrative changes noted of the prosthetic aortic valve.  Mitral Valve: The mitral valve is mildly thickened. There is moderate to severe mitral annular calcification. The peak instantaneous gradient of the mitral valve is 9 mmHg. There is severe mitral valve regurgitation which is centrally directed. The mitral regurgitant orifice area is 5 mm2. The mitral regurgitant volume is 3.94 ml.  Tricuspid Valve: The tricuspid valve is structurally normal. There is mild tricuspid regurgitation. The Doppler estimated RVSP is slightly elevated at 36.2 mmHg.  Pulmonic Valve: The pulmonic valve is structurally normal. There is trace to mild pulmonic valve regurgitation.  Pericardium: Trivial pericardial effusion.  Aorta: The aortic root is normal.  Systemic Veins: The inferior vena cava appears dilated, with IVC inspiratory collapse less than 50%.  In comparison to the previous echocardiogram(s): Compared with study dated 11/10/2024, RV is not properly visualized to compare. No changes in the LV systolic function and is severely reduced. MR was no properly visaulized. It is severe in degree given the reduced LVEF and mechanism is secondary to LV systolic dysfunction.       CONCLUSIONS:   1. Left ventricular ejection fraction is severely decreased, by visual estimate at 10-15%.   2. There is global hypokinesis of the left ventricle with minor regional variations.   3. Left ventricular diastolic filling cannot be determined, due to E/A wave fusion.   4. Abnormal septal motion consistent with post-operative status.   5. No left ventricular thrombus visualized.   6. There is reduced right ventricular systolic function.   7. The left atrium is enlarged.   8. There is  moderate to severe mitral annular calcification.   9. Severe mitral valve regurgitation.  10. Mild tricuspid regurgitation visualized.  11. Slightly elevated right ventricular systolic pressure.  12. There is some degenrative changes noted of the prosthetic aortic valve.  13. Mild aortic valve regurgitation.  14. Compared with study dated 11/10/2024, RV is not properly visualized to compare. No changes in the LV systolic function and is severely reduced. MR was no properly visaulized. It is severe in degree given the reduced LVEF and mechanism is secondary to LV systolic dysfunction.    QUANTITATIVE DATA SUMMARY:     2D MEASUREMENTS:          Normal Ranges:  Ao Root d:       3.00 cm  (2.0-3.7cm)  LAs:             4.90 cm  (2.7-4.0cm)  IVSd:            0.80 cm  (0.6-1.1cm)  LVPWd:           0.70 cm  (0.6-1.1cm)  LVIDd:           4.90 cm  (3.9-5.9cm)  LVIDs:           4.40 cm  LV Mass Index:   63 g/m2  LVEDV Index:     57 ml/m2  LV % FS          10.2 %       AORTA MEASUREMENTS:         Normal Ranges:  Asc Ao, d:          2.90 cm (2.1-3.4cm)       LV SYSTOLIC FUNCTION BY 2D PLANIMETRY (MOD):                       Normal Ranges:  EF-A4C View:    19 % (>=55%)  EF-A2C View:    16 %  EF-Biplane:     17 %  EF-Visual:      13 %  LV EF Reported: 13 %       LV DIASTOLIC FUNCTION:            Normal Ranges:  MV Peak E:             1.31 m/s   (0.7-1.2 m/s)  MV e'                  0.060 m/s  (>8.0)  MV lateral e'          0.06 m/s  MV medial e'           0.06 m/s  E/e' Ratio:            21.69      (<8.0)  PulmV Sys Raffi:         39.90 cm/s       MITRAL VALVE:          Normal Ranges:  MV Vmax:      1.51 m/s (<=1.3m/s)  MV peak P.1 mmHg (<5mmHg)  MV mean P.0 mmHg (<2mmHg)  MV Annulus:   3.10 cm  (1.8-3.1cm)       MITRAL INSUFFICIENCY:             Normal Ranges:  PISA Radius:          0.4 cm  MR VTI:               77.20 cm  MR Vmax:              394.00 cm/s  MR Alias Raffi:         20.0 cm/s  MR Volume:            3.94  ml  MR Flow Rt:           20.11 ml/s  MR EROA:              5 mm2  MR RF                 2.00 %       AORTIC VALVE:          Normal Ranges:  LVOT Diameter: 1.80 cm (1.8-2.4cm)       RIGHT VENTRICLE:  TAPSE: 8.1 mm       TRICUSPID VALVE/RVSP:          Normal Ranges:  Peak TR Velocity:     2.30 m/s  RV Syst Pressure:     36 mmHg  (< 30mmHg)  IVC Diam:             2.40 cm       PULMONIC VALVE:          Normal Ranges:  PV Accel Time:  63 msec  (>120ms)  PV Max Raffi:     0.5 m/s  (0.6-0.9m/s)  PV Max P.2 mmHg  PV Mean P.0 mmHg  PV VTI:         7.57 cm       Pulmonary Veins:  PulmV Sys Raffi: 39.90 cm/s       [id number] Emigdio Ibrahim MD  Electronically signed on 2024 at 10:44:43 AM       ** Final **  ECG 12 Lead  Sinus tachycardia with occasional Premature ventricular complexes  Right ventricular hypertrophy  Marked ST abnormality, possible inferolateral subendocardial injury  Abnormal ECG  No previous ECGs available  ECG 12 Lead  Sinus tachycardia  Right ventricular hypertrophy  Marked ST abnormality, possible inferolateral subendocardial injury  Abnormal ECG  No previous ECGs available  XR chest 1 view  Narrative: Interpreted By:  Garcia Cheng,   STUDY:  XR CHEST 1 VIEW; 11/10/2024 1:06 pm      INDICATION:  Signs/Symptoms:hemoptysis.      COMPARISON:  2024      ACCESSION NUMBER(S):  [id number]      ORDERING CLINICIAN:  TAREK GHARIBEH      FINDINGS:          CARDIOMEDIASTINAL SILHOUETTE:  Patient is status post median sternotomy.      LUNGS:  Large right-sided pleural effusion with basilar consolidation. There  is left perihilar edema. There is a reticulonodular pattern of the  left lung.      ABDOMEN:  Surgical clips overlie the left upper quadrant.      BONES:  No acute osseous changes.      Impression: 1.  Continued large right-sided pleural effusion and correlate with  underlying basilar infiltrate or subphrenic process.          Signed by: Garcia Cheng 2024 6:45 AM  Dictation  workstation:   [id number]  XR chest 1 view, XR abdomen 1 view  Narrative: Interpreted By:  Garcia Cheng,  and Brian Duncan   STUDY:  XR CHEST 1 VIEW; XR ABDOMEN 1 VIEW;  11/11/2024 1:12 am      INDICATION:  Signs/Symptoms:Post op cardiac surgery; Signs/Symptoms:OG.      COMPARISON:  Chest radiograph 11/10/2024      ACCESSION NUMBER(S):  [id number]; [id number]      ORDERING CLINICIAN:  AZUCENA LANGLEY      FINDINGS:  Supine AP radiographs of the chest and abdomen were provided.      Endotracheal tube positioned 4.4 cm above the navid.  Status post median sternotomy.  Enteric tube coursing below level of the diaphragm with distal tip  overlying the expected location of the gastric fundus/body. Inferior  approach arterial/venous cannulation. Left internal jugular central  venous catheter with distal tip projecting over the mid SVC.      CARDIOMEDIASTINAL SILHOUETTE:  Complete obscuration of the right heart border. ECMO cannula overlies  the SVC.      LUNGS:  New from prior is minimal lung aeration within the right mid lung  zone however there is persistent near-complete opacification of the  right hemithorax. No significant interval change in left lung  aeration.      ABDOMEN:  No remarkable upper abdominal findings.      BONES:  No acute osseous changes.      Impression: 1.  Minimal lung aeration within the right mid lung zone, new from  prior however persistent near-complete opacification of the right  hemithorax.  2. Medical lines and devices as discussed above.      I personally reviewed the images/study and I agree with the findings  as stated by Dakota Thomas MD (Radiology Resident).      MACRO:  None      Signed by: Garcia Cheng 11/11/2024 6:42 AM  Dictation workstation:   [id number]     Encounter Date: 11/07/24   ECG 12 Lead   Result Value    Ventricular Rate 134    Atrial Rate 134    WA Interval 134    QRS Duration 112    QT Interval 308    QTC  Calculation(Bazett) 459    P Axis 46    R Axis 236    T Axis 264    QRS Count 22    Q Onset 207    P Onset 140    P Offset 186    T Offset 361    QTC Fredericia 402    Narrative    Sinus tachycardia with occasional Premature ventricular complexes  Right ventricular hypertrophy  Marked ST abnormality, possible inferolateral subendocardial injury  Abnormal ECG  No previous ECGs available        Allergies  Ativan [lorazepam] and Phenobarbital    Scheduled medications  [Held by provider] acetaminophen, 650 mg, oral, q4h   Or  [Held by provider] acetaminophen, 650 mg, rectal, q4h  [Held by provider] aspirin, 81 mg, nasoduodenal tube, Daily  aspirin, 150 mg, rectal, Daily  fluconazole, 200 mg, intravenous, q24h  hydrocortisone sodium succinate, 50 mg, intravenous, q6h  meropenem, 1 g, intravenous, q12h  oxygen, , inhalation, Continuous - Inhalation  pantoprazole, 40 mg, oral, Daily before breakfast   Or  pantoprazole, 40 mg, intravenous, Daily before breakfast  [Held by provider] polyethylene glycol, 17 g, oral, Daily  sennosides-docusate sodium, 2 tablet, oral, BID  tranexamic acid, 500 mg, vibrating mesh nebulization, q8h  vancomycin, 500 mg, intravenous, Once      Continuous medications  amiodarone, 0.5 mg/min, Last Rate: 0.5 mg/min (11/11/24 1100)  angiotensin II (Giapreza) 2.5 mg in sodium chloride 0.9% 250 mL (0.01 mg/mL) infusion, 1.25-40 ng/kg/min, Last Rate: 40 ng/kg/min (11/11/24 1100)  cisatracurium, 0-10 mcg/kg/min, Last Rate: 1.75 mcg/kg/min (11/11/24 1306)  EPINEPHrine, 0-2 mcg/kg/min, Last Rate: 0.1 mcg/kg/min (11/11/24 1100)  epoprostenol, 0.05 mcg/kg/min (Ideal), Last Rate: 0.05 mcg/kg/min (11/11/24 0750)  fentaNYL, 75 mcg/hr, Last Rate: 75 mcg/hr (11/11/24 1100)  heparin, 0-4,000 Units/hr, Last Rate: 1,300 Units/hr (11/11/24 1100)  insulin regular, 0-30 Units/hr, Last Rate: 5.1 Units/hr (11/11/24 1305)  lactated Ringer's, 5 mL/hr, Last Rate: 5 mL/hr (11/11/24 1143)  lactated Ringer's, 20 mL/hr, Last  Rate: 20 mL/hr (11/11/24 1310)  norepinephrine, 0-1 mcg/kg/min, Last Rate: 0.04 mcg/kg/min (11/11/24 1100)  propofol, 0-50 mcg/kg/min, Last Rate: 50 mcg/kg/min (11/11/24 1100)  vasopressin, 0-0.06 Units/min, Last Rate: 0.03 Units/min (11/11/24 1100)      PRN medications  PRN medications: calcium gluconate, calcium gluconate, dextrose 10 % in water (D10W), dextrose, glucagon HCL, heparin, HYDROmorphone, magnesium sulfate, magnesium sulfate, naloxone, potassium chloride, potassium chloride, vancomycin     Assessment/Plan    Acute hypoxic respiratory failure.  Concern for right mainstem bronchus obstruction.  Acute cardiac insufficiency requiring inotropic support.  Severe diffuse encephalopathy.  On [facility] ECMO.    Palliative Performance Scale (PPS): 10%    Advanced Care Planning  Unable to discuss due to emotional distress the family is going through.      #Complex Medical Decision Making  #Goals of Care  #Advanced Care Planning  - Code status: Full code  - Next of Kin:   Adamaris Vazquez (Spouse)  [phone] (Home Phone)     #Palliative Care Encounter.  Support and empathy was provided throughout the encounter. Provided reflective listening and presence.     Medical updates obtained from Hailey KRUSE CNP    Medical Decision Making     - acute hypoxic respiratory failure and shock requiring ECMO posing threat to life and function   - Reviewed external notes from Dr. Martinez 11/11, Dr. Chavez 11/10, Dr. Walker 11/10  - Reviews results from CTA chest 11/7, BMP 11/11, CBC 11/11  - CXR 11/11 (obtained 9:09 AM) complete opacification of the right hemithorax. Left hemithorax infiltrates.   - Discussed with Hailey KRUSE CNP and Dr. DARIA Wong about pulmonology planning for bronchoscopy for further evaluation and possible intervention.    Thank you for allowing us to participate in the care of this patient. Palliative will continue to follow as needed. Palliative medicine is available Monday-Friday, 8a-6p. Please contact team  with any questions or concerns.  Team pager [phone] (weekdays)    Corbin Moe MD  Palliative Care Physician  Message: Epic Secure chat  Team pager [phone]

## 2024-11-11 NOTE — PROGRESS NOTES
Pharmacy Medication History Review    Carl Vazquez is a 59 y.o. male admitted for Cardiogenic shock (Multi). Pharmacy reviewed the patient's [id number] medications and allergies for accuracy.    Medications ADDED:  none  Medications CHANGED:  Metformin  daily to IR 1/2 tablet daily  Torsemide 100 mg daily to 50 mg daily (will take additional 1/2 tablet as needed for fluid retention)  Medications REMOVED:   none     The list below reflects the updated PTA list.   Prior to Admission Medications   Prescriptions Last Dose Informant   PARoxetine (Paxil) 10 mg tablet  Spouse/Significant Other   Sig: TAKE 1 TABLET DAILY IN THE MORNING   albuterol (Ventolin HFA) 90 mcg/actuation inhaler  Spouse/Significant Other   Sig: Inhale 2 puffs every 4 hours if needed for wheezing or shortness of breath.   aspirin 81 mg EC tablet  Spouse/Significant Other   Sig: Take 1 tablet (81 mg) by mouth every other day.   atorvastatin (Lipitor) 80 mg tablet  Spouse/Significant Other   Sig: Take 1 tablet (80 mg) by mouth once daily.  Last filled 1/23 (90 day supply)   azithromycin (Zithromax) 250 mg tablet     Sig: Take 2 tablets (500 mg) by mouth once daily for 1 day, THEN 1 tablet (250 mg) once daily for 4 days. Take 2 tabs (500 mg) by mouth today, than 1 daily for 4 days..  Not taking- completed   clopidogrel (Plavix) 75 mg tablet  Spouse/Significant Other   Sig: TAKE 1 TABLET DAILY   hydrOXYzine pamoate (VistariL) 25 mg capsule Not Taking    Sig: Take 1 capsule (25 mg) by mouth 3 times a day as needed for anxiety for up to 10 days.   Patient not taking: Reported on 11/11/2024   levothyroxine (Synthroid, Levoxyl) 25 mcg tablet  Spouse/Significant Other   Sig: TAKE 1 TABLET DAILY IN THE MORNING ON AN EMPTY STOMACH   lisinopril 5 mg tablet  Spouse/Significant Other   Sig: Take 1 tablet (5 mg) by mouth once daily.   metFORMIN (Glucophage) 500 mg tablet  Spouse/Significant Other   Sig: Take 0.5 tablets (250 mg) by mouth once  "daily in the evening. Take with meals. Do not crush, chew, or split.    No Fill history   metoprolol tartrate (Lopressor) 25 mg tablet  Spouse/Significant Other   Sig: TAKE 1 TABLET TWICE A DAY WITH FOOD   multivitamin tablet  Spouse/Significant Other   Sig: Take 1 tablet by mouth once daily.   pantoprazole (ProtoNix) 40 mg EC tablet  Spouse/Significant Other   Sig: Take 1 tablet (40 mg) by mouth once daily in the morning. Take before meals.   promethazine-DM (Phenergan-DM) 6.25-15 mg/5 mL syrup  Spouse/Significant Other   Sig: Take 5 mL by mouth every 4 hours if needed for cough.   sildenafil (Viagra) 50 mg tablet  Spouse/Significant Other   Sig: Take 1 tablet (50 mg) by mouth if needed for erectile dysfunction for up to 60 doses.   torsemide (Demadex) 100 mg tablet  Spouse/Significant Other   Sig: Take 0.5 tablets (50 mg) by mouth once daily. Will take an addition dose if needed for water retention      Facility-Administered Medications: None        The list below reflects the updated allergy list. Please review each documented allergy for additional clarification and justification.  Allergies  Reviewed by Tasneem Sams PharmD on 11/11/2024        Severity Reactions Comments    Ativan [lorazepam] High Agitation     Phenobarbital Low Hives, Rash             Patient was unable to be assessed for M2B at discharge. Please reassess closer to discharge    Sources:   [facility]  Pharmacy dispense history  Spouse, provided med list and gave additional medication history  Chart Review  Primary care note from 10/31 and 11/4  Care Everywhere    Additional Comments:  Please review comments above in RED      Tasneem Sams PharmD  Transitions of Care Pharmacist  11/11/24     Secure Chat preferred   If no response call [phone] or [employer] \"Med Rec\"    "

## 2024-11-11 NOTE — NURSING NOTE
Pt arrived to floor from SDU. Pt very tachypneic wife at bedside and in agreeance with  to be put on the vent.

## 2024-11-11 NOTE — PROGRESS NOTES
Occupational Therapy                 Therapy Communication Note    Patient Name: Carl Vazquez  MRN: [id number]  Department: [facility] [id number] NEURODG  Room: 04/04-A  Today's Date: 11/11/2024     Discipline: Occupational Therapy    Missed Visit Reason: Missed Visit Reason:  (Pt remains intubated/sedated and on ECMO at this time. Hold OT at this time.)    Missed Time: Attempt

## 2024-11-11 NOTE — NURSING NOTE
Multidisciplinary ECMO Rounds Note    Attendance:  CT Surgeon: Dr. Cisneros  CTICU Attending: Dr. Martinez   Palliative Care Attending: N  Physical Therapy Present (Y/N): N  Perfusion Present (Y/N): Y    ECMO Indication: cardiogenic shock   ECMO Cannula Configuration: Left fem-fem VA    Changes made to Hemodynamic/Ventilator/ECMO Management: Patient slowly being weaned from pressors. Patient has a right lung white out leading to extreme difficulty with ventilating patient.     Circuit Concerns: None, post oxygenator [id number]  Bleeding Concerns: patient has large amount of bloody secretions, cannulation sites remain clean and dry  Clotting/Ischemia Concerns: Patient has positive signals in feet, feet slightly dusky and cool  Anticoagulation Plan/Monitoring: Heparin infusion held for bleeding in lungs    Mobility Plan/Candidacy: not at this time  Nutrition: Not at this time  Patient/Family Concerns: Palliative care speaking with family-family extremely overwhelmed with what is happening. We will continue to support the patient and family as needed.  Nursing Concerns: None    Barriers to decannulation/anticipated decannulation date: Patient remain incredible unstable at this time. Plan for interventional pulmonology to bronch patient and attempt to cryo ablate bleeding areas if able. Once patient is stable will get a diagnostic CT scan, plan for possible impella 5.5 on Wednesday for LV vent.     ECMO:     Most Recent Range Past 24hrs   Flow 4.69 Patient Flow (L/min)  Min: 4.62  Max: 4.93   Speed 4080 Circuit Flow (RPM)  Min: 3925  Max: 4080   Sweep 6 Sweep Gas Flow Rate (L/min)  Min: 1  Max: 6       amiodarone, 0.5 mg/min, Last Rate: 0.5 mg/min (11/11/24 1300)  angiotensin II (Giapreza) 2.5 mg in sodium chloride 0.9% 250 mL (0.01 mg/mL) infusion, 1.25-40 ng/kg/min, Last Rate: 20 ng/kg/min (11/11/24 1313)  cisatracurium, 0-10 mcg/kg/min, Last Rate: 1.75 mcg/kg/min (11/11/24 1306)  EPINEPHrine, 0-2 mcg/kg/min, Last Rate: 0.1  mcg/kg/min (11/11/24 1300)  epoprostenol, 0.05 mcg/kg/min (Ideal), Last Rate: 0.05 mcg/kg/min (11/11/24 0750)  fentaNYL, 75 mcg/hr, Last Rate: 75 mcg/hr (11/11/24 1300)  heparin, 0-4,000 Units/hr, Last Rate: 1,300 Units/hr (11/11/24 1300)  insulin regular, 0-30 Units/hr, Last Rate: 6.7 Units/hr (11/11/24 1508)  lactated Ringer's, 5 mL/hr, Last Rate: 5 mL/hr (11/11/24 1300)  lactated Ringer's, 20 mL/hr, Last Rate: 20 mL/hr (11/11/24 1310)  norepinephrine, 0-1 mcg/kg/min, Last Rate: 0.04 mcg/kg/min (11/11/24 1300)  propofol, 0-50 mcg/kg/min, Last Rate: 50 mcg/kg/min (11/11/24 1300)  vasopressin, 0-0.06 Units/min, Last Rate: 0.03 Units/min (11/11/24 1300)          Vent Mode: Pressure control/assist control  FiO2 (%):  [50 %-100 %] 80 %  S RR:  [10-22] 12  S VT:  [450 mL-480 mL] 480 mL  PEEP/CPAP (cm H2O):  [5 cm H20-12 cm H20] 10 cm H20  PIP Set (cm H2O):  [32 cm H2O] 32 cm H2O  MAP (cm H2O):  [12-17] 17

## 2024-11-11 NOTE — SIGNIFICANT EVENT
Patient has been identified as having an emergent need for administration of iodinated contrast for CT scan prior to result of laboratory studies OR despite known elevated GFR due to possibility of life and/or limb threatening pathology.    I acknowledge the risks and benefits of emergently proceeding with contrast administration including that, at present, it is the position of the [employer] that contrast induced nephropathy (CIRILO) is a rare but possible consequence. At this time the benefits of proceeding with contrast administration outweigh the risks.    Attempts will be made to mitigate possible CIRILO risk with IV fluid hydration if able.

## 2024-11-11 NOTE — PROCEDURES
Patient with white out of right lung on CXR. Consolidations on CT chest concerning for pneumonia.    Bronchoscope was inserted via the existing  ETT.     The bronchoscope was then advanced to the right upper lobe, middle, and lower lobes. Diffuse, thin, yellow, frothy secretions suctioned from airways. All areas were inspected for any acute hemorrhage, but mild oozing seen. No shelley blood. The bronchoscope was withdrawn.    The scope was then directed into the left side which was noted to have no significant amount of mucous secretions.  All areas were inspected for hemorrhage, none apparent.  The bronchoscope was withdrawn.      The patient tolerated the procedure well.  There were no apparent complications related to the procedure.

## 2024-11-12 ENCOUNTER — APPOINTMENT (OUTPATIENT)
Dept: RADIOLOGY | Facility: HOSPITAL | Age: 59
DRG: 003 | End: 2024-11-12
Payer: MEDICARE

## 2024-11-12 VITALS
DIASTOLIC BLOOD PRESSURE: 59 MMHG | WEIGHT: 159.61 LBS | TEMPERATURE: 96.8 F | BODY MASS INDEX: 22.35 KG/M2 | HEIGHT: 71 IN | SYSTOLIC BLOOD PRESSURE: 85 MMHG

## 2024-11-12 LAB
ADENOVIRUS RVP, VIRC: NOT DETECTED
ALBUMIN SERPL BCP-MCNC: 2.5 G/DL (ref 3.4–5)
ALBUMIN SERPL BCP-MCNC: 2.6 G/DL (ref 3.4–5)
ALP SERPL-CCNC: 178 U/L (ref 33–120)
ALT SERPL W P-5'-P-CCNC: 1166 U/L (ref 10–52)
ANION GAP BLDA CALCULATED.4IONS-SCNC: 11 MMO/L (ref 10–25)
ANION GAP BLDA CALCULATED.4IONS-SCNC: 8 MMO/L (ref 10–25)
ANION GAP BLDA CALCULATED.4IONS-SCNC: 9 MMO/L (ref 10–25)
ANION GAP SERPL CALC-SCNC: 14 MMOL/L (ref 10–20)
ANION GAP SERPL CALC-SCNC: 15 MMOL/L (ref 10–20)
APTT PPP: 40 SECONDS (ref 27–38)
APTT PPP: 51 SECONDS (ref 27–38)
AST SERPL W P-5'-P-CCNC: 4588 U/L (ref 9–39)
ATRIAL RATE: 133 BPM
ATRIAL RATE: 134 BPM
BACTERIA BLD CULT: NORMAL
BACTERIA BLD CULT: NORMAL
BACTERIA SPEC RESP CULT: NO GROWTH
BASE EXCESS BLDA CALC-SCNC: 6.3 MMOL/L (ref -2–3)
BASE EXCESS BLDA CALC-SCNC: 7.4 MMOL/L
BASE EXCESS BLDA CALC-SCNC: 9.4 MMOL/L (ref -2–3)
BASE EXCESS BLDA CALC-SCNC: 9.8 MMOL/L (ref -2–3)
BASE EXCESS BLDV CALC-SCNC: 6.2 MMOL/L
BILIRUB DIRECT SERPL-MCNC: 3.4 MG/DL (ref 0–0.3)
BILIRUB SERPL-MCNC: 5.9 MG/DL (ref 0–1.2)
BODY TEMPERATURE: 37 DEGREES CELSIUS
BUN SERPL-MCNC: 29 MG/DL (ref 6–23)
BUN SERPL-MCNC: 31 MG/DL (ref 6–23)
CA-I BLD-SCNC: 1.05 MMOL/L (ref 1.1–1.33)
CA-I BLD-SCNC: 1.14 MMOL/L (ref 1.1–1.33)
CA-I BLDA-SCNC: 1.05 MMOL/L (ref 1.1–1.33)
CA-I BLDA-SCNC: 1.1 MMOL/L (ref 1.1–1.33)
CA-I BLDA-SCNC: 1.12 MMOL/L (ref 1.1–1.33)
CALCIUM SERPL-MCNC: 8.2 MG/DL (ref 8.6–10.6)
CALCIUM SERPL-MCNC: 8.3 MG/DL (ref 8.6–10.6)
CARDIAC TROPONIN I PNL SERPL HS: ABNORMAL NG/L (ref 0–53)
CHLORIDE BLDA-SCNC: 100 MMOL/L (ref 98–107)
CHLORIDE BLDA-SCNC: 100 MMOL/L (ref 98–107)
CHLORIDE BLDA-SCNC: 99 MMOL/L (ref 98–107)
CHLORIDE SERPL-SCNC: 94 MMOL/L (ref 98–107)
CHLORIDE SERPL-SCNC: 95 MMOL/L (ref 98–107)
CO2 SERPL-SCNC: 36 MMOL/L (ref 21–32)
CO2 SERPL-SCNC: 36 MMOL/L (ref 21–32)
CREAT SERPL-MCNC: 2.39 MG/DL (ref 0.5–1.3)
CREAT SERPL-MCNC: 2.55 MG/DL (ref 0.5–1.3)
EGFRCR SERPLBLD CKD-EPI 2021: 28 ML/MIN/1.73M*2
EGFRCR SERPLBLD CKD-EPI 2021: 30 ML/MIN/1.73M*2
ENTEROVIRUS/RHINOVIRUS RVP, VIRC: NOT DETECTED
ERYTHROCYTE [DISTWIDTH] IN BLOOD BY AUTOMATED COUNT: 15.6 % (ref 11.5–14.5)
ERYTHROCYTE [DISTWIDTH] IN BLOOD BY AUTOMATED COUNT: 15.9 % (ref 11.5–14.5)
GLUCOSE BLD MANUAL STRIP-MCNC: 112 MG/DL (ref 74–99)
GLUCOSE BLD MANUAL STRIP-MCNC: 127 MG/DL (ref 74–99)
GLUCOSE BLD MANUAL STRIP-MCNC: 128 MG/DL (ref 74–99)
GLUCOSE BLD MANUAL STRIP-MCNC: 139 MG/DL (ref 74–99)
GLUCOSE BLDA-MCNC: 130 MG/DL (ref 74–99)
GLUCOSE BLDA-MCNC: 145 MG/DL (ref 74–99)
GLUCOSE BLDA-MCNC: 146 MG/DL (ref 74–99)
GLUCOSE SERPL-MCNC: 104 MG/DL (ref 74–99)
GLUCOSE SERPL-MCNC: 118 MG/DL (ref 74–99)
GRAM STN SPEC: NORMAL
GRAM STN SPEC: NORMAL
HCO3 BLDA-SCNC: 29.1 MMOL/L (ref 22–26)
HCO3 BLDA-SCNC: 29.3 MMOL/L
HCO3 BLDA-SCNC: 32.4 MMOL/L (ref 22–26)
HCO3 BLDA-SCNC: 33.5 MMOL/L (ref 22–26)
HCO3 BLDV-SCNC: 29.5 MMOL/L
HCT VFR BLD AUTO: 26.2 % (ref 41–52)
HCT VFR BLD AUTO: 28.3 % (ref 41–52)
HCT VFR BLD EST: 27 % (ref 41–52)
HCT VFR BLD EST: 28 % (ref 41–52)
HCT VFR BLD EST: 28 % (ref 41–52)
HGB BLD-MCNC: 9.3 G/DL (ref 13.5–17.5)
HGB BLD-MCNC: 9.7 G/DL (ref 13.5–17.5)
HGB BLDA-MCNC: 9.1 G/DL (ref 13.5–17.5)
HGB BLDA-MCNC: 9.4 G/DL (ref 13.5–17.5)
HGB BLDA-MCNC: 9.4 G/DL (ref 13.5–17.5)
HUMAN BOCAVIRUS RVP, VIRC: NOT DETECTED
HUMAN CORONAVIRUS RVP, VIRC: NOT DETECTED
INFLUENZA A , VIRC: NOT DETECTED
INFLUENZA A H1N1-09 , VIRC: NOT DETECTED
INFLUENZA B PCR, VIRC: NOT DETECTED
INHALED O2 CONCENTRATION: 100 %
INR PPP: 2.1 (ref 0.9–1.1)
INR PPP: 2.1 (ref 0.9–1.1)
LACTATE BLDA-SCNC: 3.4 MMOL/L (ref 0.4–2)
LACTATE BLDA-SCNC: 4.5 MMOL/L (ref 0.4–2)
LACTATE BLDA-SCNC: 4.7 MMOL/L (ref 0.4–2)
LDH SERPL L TO P-CCNC: 4906 U/L (ref 84–246)
MAGNESIUM SERPL-MCNC: 2.09 MG/DL (ref 1.6–2.4)
MAGNESIUM SERPL-MCNC: 2.15 MG/DL (ref 1.6–2.4)
MCH RBC QN AUTO: 32.8 PG (ref 26–34)
MCH RBC QN AUTO: 32.9 PG (ref 26–34)
MCHC RBC AUTO-ENTMCNC: 34.3 G/DL (ref 32–36)
MCHC RBC AUTO-ENTMCNC: 35.5 G/DL (ref 32–36)
MCV RBC AUTO: 93 FL (ref 80–100)
MCV RBC AUTO: 96 FL (ref 80–100)
METAPNEUMOVIRUS , VIRC: NOT DETECTED
NRBC BLD-RTO: 0.5 /100 WBCS (ref 0–0)
NRBC BLD-RTO: 0.8 /100 WBCS (ref 0–0)
OXYHGB MFR BLDA: 91 % (ref 94–98)
OXYHGB MFR BLDA: 92.9 % (ref 94–98)
OXYHGB MFR BLDA: 93.3 % (ref 94–98)
OXYHGB MFR BLDA: 96.7 %
OXYHGB MFR BLDV: 83.7 %
P AXIS: 46 DEGREES
P AXIS: 48 DEGREES
P OFFSET: 186 MS
P ONSET: 140 MS
PARAINFLUENZA PCR, VIRC: NOT DETECTED
PCO2 BLDA: 32 MM HG
PCO2 BLDA: 34 MM HG (ref 38–42)
PCO2 BLDA: 37 MM HG (ref 38–42)
PCO2 BLDA: 41 MM HG (ref 38–42)
PCO2 BLDV: 37 MM HG
PH BLDA: 7.52 PH (ref 7.38–7.42)
PH BLDA: 7.54 PH (ref 7.38–7.42)
PH BLDA: 7.55 PH (ref 7.38–7.42)
PH BLDA: 7.57 PH
PH BLDV: 7.51 PH
PHOSPHATE SERPL-MCNC: 2.1 MG/DL (ref 2.5–4.9)
PHOSPHATE SERPL-MCNC: 3.1 MG/DL (ref 2.5–4.9)
PLATELET # BLD AUTO: 246 X10*3/UL (ref 150–450)
PLATELET # BLD AUTO: 254 X10*3/UL (ref 150–450)
PO2 BLDA: 431 MM HG
PO2 BLDA: 63 MM HG (ref 85–95)
PO2 BLDA: 64 MM HG (ref 85–95)
PO2 BLDA: 73 MM HG (ref 85–95)
PO2 BLDV: 51 MM HG
POTASSIUM BLDA-SCNC: 4.4 MMOL/L (ref 3.5–5.3)
POTASSIUM BLDA-SCNC: 4.4 MMOL/L (ref 3.5–5.3)
POTASSIUM BLDA-SCNC: 4.6 MMOL/L (ref 3.5–5.3)
POTASSIUM SERPL-SCNC: 4.1 MMOL/L (ref 3.5–5.3)
POTASSIUM SERPL-SCNC: 4.2 MMOL/L (ref 3.5–5.3)
PR INTERVAL: 134 MS
PR INTERVAL: 160 MS
PROT SERPL-MCNC: 4.6 G/DL (ref 6.4–8.2)
PROTHROMBIN TIME: 24.4 SECONDS (ref 9.8–12.8)
PROTHROMBIN TIME: 24.4 SECONDS (ref 9.8–12.8)
Q ONSET: 207 MS
Q ONSET: 208 MS
QRS COUNT: 21 BEATS
QRS COUNT: 22 BEATS
QRS DURATION: 104 MS
QRS DURATION: 112 MS
QT INTERVAL: 290 MS
QT INTERVAL: 308 MS
QTC CALCULATION(BAZETT): 431 MS
QTC CALCULATION(BAZETT): 459 MS
QTC FREDERICIA: 377 MS
QTC FREDERICIA: 402 MS
R AXIS: 193 DEGREES
R AXIS: 236 DEGREES
RBC # BLD AUTO: 2.83 X10*6/UL (ref 4.5–5.9)
RBC # BLD AUTO: 2.96 X10*6/UL (ref 4.5–5.9)
RSV PCR, RVP, VIRC: NOT DETECTED
SAO2 % BLDA: 100 %
SAO2 % BLDA: 95 % (ref 94–100)
SAO2 % BLDA: 96 % (ref 94–100)
SAO2 % BLDA: 97 % (ref 94–100)
SAO2 % BLDV: 87 %
SODIUM BLDA-SCNC: 136 MMOL/L (ref 136–145)
SODIUM BLDA-SCNC: 136 MMOL/L (ref 136–145)
SODIUM BLDA-SCNC: 137 MMOL/L (ref 136–145)
SODIUM SERPL-SCNC: 141 MMOL/L (ref 136–145)
SODIUM SERPL-SCNC: 141 MMOL/L (ref 136–145)
STAPHYLOCOCCUS SPEC CULT: NORMAL
T AXIS: 213 DEGREES
T AXIS: 264 DEGREES
T OFFSET: 353 MS
T OFFSET: 361 MS
VANCOMYCIN TROUGH SERPL-MCNC: 12.2 UG/ML (ref 5–20)
VANCOMYCIN TROUGH SERPL-MCNC: 13.4 UG/ML (ref 5–20)
VENTRICULAR RATE: 133 BPM
VENTRICULAR RATE: 134 BPM
WBC # BLD AUTO: 54 X10*3/UL (ref 4.4–11.3)
WBC # BLD AUTO: 56.6 X10*3/UL (ref 4.4–11.3)

## 2024-11-12 PROCEDURE — 70491 CT SOFT TISSUE NECK W/DYE: CPT | Performed by: RADIOLOGY

## 2024-11-12 PROCEDURE — 82248 BILIRUBIN DIRECT: CPT | Performed by: STUDENT IN AN ORGANIZED HEALTH CARE EDUCATION/TRAINING PROGRAM

## 2024-11-12 PROCEDURE — 71275 CT ANGIOGRAPHY CHEST: CPT

## 2024-11-12 PROCEDURE — 83735 ASSAY OF MAGNESIUM: CPT | Performed by: STUDENT IN AN ORGANIZED HEALTH CARE EDUCATION/TRAINING PROGRAM

## 2024-11-12 PROCEDURE — 2500000004 HC RX 250 GENERAL PHARMACY W/ HCPCS (ALT 636 FOR OP/ED)

## 2024-11-12 PROCEDURE — 2500000004 HC RX 250 GENERAL PHARMACY W/ HCPCS (ALT 636 FOR OP/ED): Performed by: STUDENT IN AN ORGANIZED HEALTH CARE EDUCATION/TRAINING PROGRAM

## 2024-11-12 PROCEDURE — 37799 UNLISTED PX VASCULAR SURGERY: CPT | Performed by: STUDENT IN AN ORGANIZED HEALTH CARE EDUCATION/TRAINING PROGRAM

## 2024-11-12 PROCEDURE — 2550000001 HC RX 255 CONTRASTS: Performed by: STUDENT IN AN ORGANIZED HEALTH CARE EDUCATION/TRAINING PROGRAM

## 2024-11-12 PROCEDURE — 2500000001 HC RX 250 WO HCPCS SELF ADMINISTERED DRUGS (ALT 637 FOR MEDICARE OP)

## 2024-11-12 PROCEDURE — 71045 X-RAY EXAM CHEST 1 VIEW: CPT

## 2024-11-12 PROCEDURE — 99238 HOSP IP/OBS DSCHRG MGMT 30/<: CPT | Performed by: NURSE PRACTITIONER

## 2024-11-12 PROCEDURE — 99222 1ST HOSP IP/OBS MODERATE 55: CPT | Performed by: INTERNAL MEDICINE

## 2024-11-12 PROCEDURE — 84100 ASSAY OF PHOSPHORUS: CPT | Performed by: STUDENT IN AN ORGANIZED HEALTH CARE EDUCATION/TRAINING PROGRAM

## 2024-11-12 PROCEDURE — 99291 CRITICAL CARE FIRST HOUR: CPT | Performed by: ANESTHESIOLOGY

## 2024-11-12 PROCEDURE — 82947 ASSAY GLUCOSE BLOOD QUANT: CPT

## 2024-11-12 PROCEDURE — 82805 BLOOD GASES W/O2 SATURATION: CPT | Performed by: NURSE PRACTITIONER

## 2024-11-12 PROCEDURE — 95720 EEG PHY/QHP EA INCR W/VEEG: CPT | Performed by: PSYCHIATRY & NEUROLOGY

## 2024-11-12 PROCEDURE — 70450 CT HEAD/BRAIN W/O DYE: CPT

## 2024-11-12 PROCEDURE — 71275 CT ANGIOGRAPHY CHEST: CPT | Performed by: RADIOLOGY

## 2024-11-12 PROCEDURE — 85027 COMPLETE CBC AUTOMATED: CPT | Performed by: STUDENT IN AN ORGANIZED HEALTH CARE EDUCATION/TRAINING PROGRAM

## 2024-11-12 PROCEDURE — 33949 ECMO/ECLS DAILY MGMT ARTERY: CPT

## 2024-11-12 PROCEDURE — 2500000004 HC RX 250 GENERAL PHARMACY W/ HCPCS (ALT 636 FOR OP/ED): Performed by: NURSE PRACTITIONER

## 2024-11-12 PROCEDURE — 84132 ASSAY OF SERUM POTASSIUM: CPT | Performed by: STUDENT IN AN ORGANIZED HEALTH CARE EDUCATION/TRAINING PROGRAM

## 2024-11-12 PROCEDURE — 71045 X-RAY EXAM CHEST 1 VIEW: CPT | Performed by: RADIOLOGY

## 2024-11-12 PROCEDURE — 99223 1ST HOSP IP/OBS HIGH 75: CPT | Performed by: THORACIC SURGERY (CARDIOTHORACIC VASCULAR SURGERY)

## 2024-11-12 PROCEDURE — 94645 CONT INHLJ TX EACH ADDL HOUR: CPT

## 2024-11-12 PROCEDURE — 83615 LACTATE (LD) (LDH) ENZYME: CPT | Performed by: STUDENT IN AN ORGANIZED HEALTH CARE EDUCATION/TRAINING PROGRAM

## 2024-11-12 PROCEDURE — 70450 CT HEAD/BRAIN W/O DYE: CPT | Performed by: RADIOLOGY

## 2024-11-12 PROCEDURE — 2500000004 HC RX 250 GENERAL PHARMACY W/ HCPCS (ALT 636 FOR OP/ED): Performed by: EMERGENCY MEDICINE

## 2024-11-12 PROCEDURE — 74177 CT ABD & PELVIS W/CONTRAST: CPT

## 2024-11-12 PROCEDURE — 37799 UNLISTED PX VASCULAR SURGERY: CPT | Performed by: NURSE PRACTITIONER

## 2024-11-12 PROCEDURE — 94640 AIRWAY INHALATION TREATMENT: CPT

## 2024-11-12 PROCEDURE — 31622 DX BRONCHOSCOPE/WASH: CPT

## 2024-11-12 PROCEDURE — 95715 VEEG EA 12-26HR INTMT MNTR: CPT

## 2024-11-12 PROCEDURE — 70491 CT SOFT TISSUE NECK W/DYE: CPT

## 2024-11-12 PROCEDURE — 74177 CT ABD & PELVIS W/CONTRAST: CPT | Performed by: RADIOLOGY

## 2024-11-12 PROCEDURE — 2500000004 HC RX 250 GENERAL PHARMACY W/ HCPCS (ALT 636 FOR OP/ED): Mod: JZ

## 2024-11-12 PROCEDURE — 84484 ASSAY OF TROPONIN QUANT: CPT | Performed by: STUDENT IN AN ORGANIZED HEALTH CARE EDUCATION/TRAINING PROGRAM

## 2024-11-12 PROCEDURE — 80202 ASSAY OF VANCOMYCIN: CPT | Performed by: NURSE PRACTITIONER

## 2024-11-12 PROCEDURE — 82330 ASSAY OF CALCIUM: CPT | Performed by: STUDENT IN AN ORGANIZED HEALTH CARE EDUCATION/TRAINING PROGRAM

## 2024-11-12 PROCEDURE — 94799 UNLISTED PULMONARY SVC/PX: CPT

## 2024-11-12 PROCEDURE — 85610 PROTHROMBIN TIME: CPT | Performed by: STUDENT IN AN ORGANIZED HEALTH CARE EDUCATION/TRAINING PROGRAM

## 2024-11-12 PROCEDURE — 2500000004 HC RX 250 GENERAL PHARMACY W/ HCPCS (ALT 636 FOR OP/ED): Mod: JZ | Performed by: STUDENT IN AN ORGANIZED HEALTH CARE EDUCATION/TRAINING PROGRAM

## 2024-11-12 PROCEDURE — 80202 ASSAY OF VANCOMYCIN: CPT | Performed by: STUDENT IN AN ORGANIZED HEALTH CARE EDUCATION/TRAINING PROGRAM

## 2024-11-12 PROCEDURE — 2500000005 HC RX 250 GENERAL PHARMACY W/O HCPCS: Performed by: EMERGENCY MEDICINE

## 2024-11-12 RX ORDER — LEVOTHYROXINE SODIUM 25 UG/1
25 TABLET ORAL
Status: DISCONTINUED | OUTPATIENT
Start: 2024-11-12 | End: 2024-11-12 | Stop reason: HOSPADM

## 2024-11-12 RX ORDER — VANCOMYCIN HYDROCHLORIDE
1250 ONCE
Status: COMPLETED | OUTPATIENT
Start: 2024-11-12 | End: 2024-11-12

## 2024-11-12 RX ORDER — DEXTROSE MONOHYDRATE 100 MG/ML
20 INJECTION, SOLUTION INTRAVENOUS AS NEEDED
Status: DISCONTINUED | OUTPATIENT
Start: 2024-11-12 | End: 2024-11-12 | Stop reason: HOSPADM

## 2024-11-12 RX ORDER — GLUCAGON 1 MG/ML
1 KIT INJECTION
Status: DISCONTINUED | OUTPATIENT
Start: 2024-11-12 | End: 2024-11-12 | Stop reason: HOSPADM

## 2024-11-12 RX ORDER — DEXTROSE 50 % IN WATER (D50W) INTRAVENOUS SYRINGE
10-50
Status: DISCONTINUED | OUTPATIENT
Start: 2024-11-12 | End: 2024-11-12 | Stop reason: HOSPADM

## 2024-11-12 RX ADMIN — FLUCONAZOLE 200 MG: 2 INJECTION, SOLUTION INTRAVENOUS at 01:06

## 2024-11-12 RX ADMIN — EPOPROSTENOL 0.05 MCG/KG/MIN: 1.5 INJECTION, POWDER, LYOPHILIZED, FOR SOLUTION INTRAVENOUS at 04:59

## 2024-11-12 RX ADMIN — ASPIRIN 150 MG: 300 SUPPOSITORY RECTAL at 09:15

## 2024-11-12 RX ADMIN — HYDROCORTISONE SODIUM SUCCINATE 50 MG: 100 INJECTION, POWDER, FOR SOLUTION INTRAMUSCULAR; INTRAVENOUS at 05:50

## 2024-11-12 RX ADMIN — CALCIUM GLUCONATE 1 G: 20 INJECTION, SOLUTION INTRAVENOUS at 05:27

## 2024-11-12 RX ADMIN — HEPARIN SODIUM 5000 UNITS: 5000 INJECTION, SOLUTION INTRAVENOUS; SUBCUTANEOUS at 01:06

## 2024-11-12 RX ADMIN — PROPOFOL 50 MCG/KG/MIN: 10 INJECTION, EMULSION INTRAVENOUS at 14:06

## 2024-11-12 RX ADMIN — CALCIUM GLUCONATE 2 G: 20 INJECTION, SOLUTION INTRAVENOUS at 11:12

## 2024-11-12 RX ADMIN — MEROPENEM AND SODIUM CHLORIDE 1 G: 1 INJECTION, SOLUTION INTRAVENOUS at 12:27

## 2024-11-12 RX ADMIN — PROPOFOL 50 MCG/KG/MIN: 10 INJECTION, EMULSION INTRAVENOUS at 04:35

## 2024-11-12 RX ADMIN — HEPARIN SODIUM 5000 UNITS: 5000 INJECTION, SOLUTION INTRAVENOUS; SUBCUTANEOUS at 09:15

## 2024-11-12 RX ADMIN — HYDROCORTISONE SODIUM SUCCINATE 50 MG: 100 INJECTION, POWDER, FOR SOLUTION INTRAMUSCULAR; INTRAVENOUS at 12:27

## 2024-11-12 RX ADMIN — Medication 75 MCG/HR: at 10:34

## 2024-11-12 RX ADMIN — TRANEXAMIC ACID 500 MG: 100 INJECTION INTRAVENOUS at 02:58

## 2024-11-12 RX ADMIN — PANTOPRAZOLE SODIUM 40 MG: 40 INJECTION, POWDER, FOR SOLUTION INTRAVENOUS at 06:16

## 2024-11-12 RX ADMIN — Medication 1250 MG: at 11:19

## 2024-11-12 RX ADMIN — PROPOFOL 50 MCG/KG/MIN: 10 INJECTION, EMULSION INTRAVENOUS at 08:56

## 2024-11-12 RX ADMIN — TRANEXAMIC ACID 500 MG: 100 INJECTION INTRAVENOUS at 11:11

## 2024-11-12 RX ADMIN — IOHEXOL 80 ML: 350 INJECTION, SOLUTION INTRAVENOUS at 10:53

## 2024-11-12 ASSESSMENT — PAIN - FUNCTIONAL ASSESSMENT
PAIN_FUNCTIONAL_ASSESSMENT: CPOT (CRITICAL CARE PAIN OBSERVATION TOOL)

## 2024-11-12 ASSESSMENT — PAIN SCALES - GENERAL
PAINLEVEL_OUTOF10: 0 - NO PAIN

## 2024-11-12 NOTE — PROGRESS NOTES
CTICU Daily Progress Note    Carl Vazquez is a 59 y.o. male with PMH relevant for CABGx4 2007 & Redo CABG x1 & AVRt, NSTEMI 2020 w/ PCI to RCA d/t graft failure, Recurrent pleural effusion s/p R pleurodesis, HL w/ chemo/XRT (remote), HTN, T2DM, VTE who presented to [facility] ED for dyspnea, diagnosed w/ PE and acutely decompensated 11/10 req pVA ECMO cannulation w/ Dr. Agarwal on 11/10.    Briefly, patient's symptoms started 10/7 with worsening productive cough, dyspnea. Z-latha course without improvement and more debility. Presented to ED with w/ workup notable for leukocytosis, CHRIS, elevated BNP. Chest imaging concerning for PE w/in distal R main PA and segmental arteries of RML, RLL; R heart strain; consolidative opacities in RML, RLL w/ possible superimposed malignancy. He was started on ABX, Heparin w/ plans for bronchoscopy @ [facility] on 11/11.     On 11/10, patient acute decompensated with respiratory distress and worsening hypotension and acidosis. Shock team notified and he was started on high-dose vasopressors and cannulated on L fem-fem pVA ECMO w/ DPC and transferred to . TTE w/ severe BiV dysfunction, mild AI, mod-severe MR.    Admission course has been significant for tracheal injury and worsening AHRF    24 Hour Events:  - Worsening oxygenation in AM; repeat bronch w/ c/f airway injury vs clot. Pulm & IP consulted for bronch    Objective     Vitals:  24hr Min/Max:  Temp  Min: 31.5 °C (88.7 °F)  Max: 37.1 °C (98.8 °F)  Pulse  Min: 94  Max: 129  BP  Min: 85/59  Max: 85/59  Resp  Min: 0  Max: 34  SpO2  Min: 84 %  Max: 99 %    I/O:  I/O last 2 completed shifts:  In: 5694.2 (78.6 mL/kg) [I.V.:3608.4 (49.8 mL/kg); Blood:500; IV Piggyback:1585.8]  Out: 1240 (17.1 mL/kg) [Urine:1210 (0.7 mL/kg/hr); Emesis/NG output:30]  Weight: 72.4 kg     Hemodynamic parameters for last 24 hours:  CVP:  [11 mmHg-60 mmHg] 13 mmHg      Vent settings:  Vent Mode: Pressure control/assist control  FiO2 (%):  [50 %-100 %]  100 %  S RR:  [10-22] 10  S VT:  [480 mL] 480 mL  PEEP/CPAP (cm H2O):  [8 cm H20-12 cm H20] 12 cm H20  PIP Set (cm H2O):  [32 cm H2O] 32 cm H2O  MAP (cm H2O):  [12-17] 17    LDA:  ECMO Cannulation Peripheral Femoral vein;Left (Active)   Placement Date/Time: 11/10/24 2200   ECMO Type: Peripheral  Cannulation Site: Femoral vein;Left  Line Securement: Line secured in 2 locations;Sutures;Securement device   Number of days: 0       ECMO Cannulation Peripheral Left;Femoral artery (Active)   Placement Date/Time: 11/10/24 2200   ECMO Type: Peripheral  Cannulation Site: Left;Femoral artery   Number of days: 0       CVC 11/10/24 Triple lumen Non-tunneled Left Internal jugular (Active)   Placement Date/Time: 11/10/24 1530   Hand Hygiene Performed Prior to CVC Insertion: Yes  Site Prep: Chlorhexidine   Site Prep Agent has Completely Dried Before Insertion: Yes  All 5 Sterile Barriers Used (Gloves, Gown, Cap, Mask, Large Sterile Drape):...   Number of days: 1       Arterial Line 11/10/24 Right Axillary (Active)   Placement Date/Time: 11/10/24 1830   Hand Hygiene Completed: Yes  Orientation: Right  Location: Axillary  Site Prep: Chlorhexidine   Local Anesthetic: None  Technique: Ultrasound guidance  Placed by: Dean Dorantes  Insertion attempts: 1  Securement Meth...   Number of days: 1       ETT  8 mm (Active)   Placement Date/Time: 11/10/24 1405   Placed by External Staff?: Other (Comment)  Hand Hygiene Completed: Yes  Technique: Video laryngoscopy  ETT Type: ETT - single  Single Lumen Tube Size: 8 mm  Cuffed: Yes  Laryngoscope: Jl  Blade Size: 3  Loc...   Number of days: 1       Urethral Catheter Non-latex 16 Fr. (Active)   Placement Date/Time: 11/10/24 1430   Placed by: ERIN Marsh RN  Hand Hygiene Completed: Yes  Catheter Type: Non-latex  Tube Size (Fr.): 16 Fr.  Catheter Balloon Size: 10 mL  Urine Returned: (c) Yes   Number of days: 1       NG/OG/Feeding Tube OG - [place] sump Left mouth (Active)   Placement Date/Time:  11/10/24 1408   Placed by: Dr. Frye  Hand Hygiene Completed: Yes  Type of Tube: NG/OG Tube  Tube Type: OG - [place] sump  Tube Location: Left mouth   Number of days: 1         Labs and radiographic studies reviewed    Assessment/Plan     Carl Vazquez was seen and examined in the ICU. This includes review of the history, physical, ICU flow sheets, laboratory and radiographic data, medication administration record and other current clinical data.  I have seen the patient independently and reviewed the plan with the house staff, advanced practice provider, nursing team, respiratory therapy, primary service, consultants, and patient and/or family members.  This note serves to document my findings and time spent. My key findings, diagnoses and plans for ICU management today include the following:    Problem List:  Patient Active Problem List   Diagnosis    Abnormal liver diagnostic imaging    High alkaline phosphatase    Acute cough    Abdominal pain    Chest pain    Lumbago    Anxiety    Aortic valve disorder    Coronary arteriosclerosis    Basal cell carcinoma    Blood glucose abnormal    Chronic systolic heart failure    DM (diabetes mellitus) (Multi)    Dyslipidemia    Elbow pain    Electrocardiogram abnormal    Erythropoietic porphyria (Multi)    Essential hypertension    Finger infection    Gastroesophageal reflux disease    Heart disease    Low blood pressure    Heart failure    Hodgkin lymphoma    Hyperbilirubinemia    Mixed hyperlipidemia    Ileitis    Incisional hernia    Jaundice    Lung mass    Pleural effusion    Pneumonia    Postoperative pain    Pulmonary hypertension (Multi)    Shortness of breath    Goiter    Thyromegaly    Type 2 diabetes mellitus without complication (Multi)    Venous thrombosis    Hx of replacement of aortic valve    Acute pulmonary embolism with acute cor pulmonale (Multi)    NSTEMI (non-ST elevated myocardial infarction) (Multi)    Acute renal failure (ARF) (CMS-HCC)     Moderate protein-calorie malnutrition (Multi)    Acute cholecystitis    QT prolongation    Colitis    Empyema, right (Multi)    Pneumonia of right lower lobe due to infectious organism    Pleural effusion on right    Pulmonary abscess (Multi)    Leukocytosis    Alkaline phosphatase elevation    Prediabetes    Cardiogenic shock (Multi)        ICU Diagnoses requiring active management:  - Acute hypoxic respiratory failure  - Acute cardiac insufficiency requiring inotropic support  - Hypotension requiring vasopressor support  - Anemia of blood loss  - Stress hyperglycemia    Attending Recommendation:     NEURO:  #Pain/sedation  #Delirium  - On prop & fent gtt d/t Nimbex gtt d/t inability to ventilate; need neuro exam once Nimbex weaned  - vEEG w/ severe diffuse encephalopathy  - Plan for CT head as part of imaging need    CV:  #PMH of CABG x4 w/ redo CABGx1 SVG to RCA, NSTEMI w/ PCI x2 to RCA graft in 2020  #Cardiogenic shock on pVA ECMO 11/10  #Vasoplegia  - Patient w/ suspected PE dx on CT-A 11/7, with worsening decompensation 11/10 req pVA ECMO  - L fem-fem pVA ECMO w/ DPC, currently requiring full support  - Continue Levo, Vaso, AT2 for MAP > 65  - Continue SDS w/ Hydrocortisone 50 q6  - Currently deferring PAC d/t c/f unstable/worsening PE, will evaluate clot burden on CT today  - Daily hemolysis labs for ECMO  - Currently adequate pulsatility of ~20mmHg w/ medical venting with inotropy  - TTE 11/11 severe BiV, mild AI, mod-severe MR, estimated RVSP 36mmHg  - Continue central venous catheter and arterial line   - Gentle fluid resuscitation to support cardiac output    PULM:  #Acute hypoxic respiratory failure  #C/f TEF d/t posterior tracheal wall injury  - Patient bronch on admission 11/10 with no airway abnormality noted, mild secretions with slightly friable mucosa  - 11/11 AM w/ acute inability to ventilate w/ elevated PIP, persistent opacification of R hemithorax with bloody secretions, repeat bronch w/  concern for abnormal airway tissue vs clot d/t bleed  - Pulm & IP consulted, bronch by IP w/ c/f posterior tracheal wall injury leading to TEF  - Poor aeration of bilateral lugs, will place lung on rest settings and maximize ECMO support  - Continue mechanical ventilation  - Will plan for GI consultation & Thoracic surgery consultation to evaluate for TEF, possible therapeutic interventions  - Given need to reassess degree of lung injury and clot burden, and improved vasopressor requirement, will plan to repeat axial imaging with CT-A of C/A/P as well as non-con evaluation of head    GI:  #Nutrition  #TEF  - Strict NPO for now  - Bowel regimen  - Stress ulcer ppx  - Need GI & Thoracic surgery input  - RUQ for elevated LFTs, INR, and hyperbilirubinemia suspected d/t shock liver    RENAL:  #Fluid and electrolyte abnormalities  #CHRIS  - Monitor UOP with strict I/O while ICU  - CTM BMP with goal K > 4, Mg > 2 while in ICU    ENDO:  #Hyperglycemia  - Insulin drip for hyperglycemia, switch to SSI for goal BG < 180 as able    ID:  #C/f PNA  - BSABX w/ Vanc, Shyann, Kathy  - F/U cultures    HEME:  #Perioperative anemia  #Perioperative coagulopathy  - No need for allotransfusion at this time, will continue to monitor  - Deferring Heparin gtt due to c/f airway bleeding; continue to re-evaluate, switch to SQH    MSK/SKIN:  - Continue daily evaluation for prevention of SSI/PI    PPX:  - SCD, SQH  - PPI for SUP    TLD:  - CVC, Arterial Line, Lupis Martinez MD, MS    Division of Critical Care Medicine  Department of Anesthesiology      Patient's condition remains critical, requires frequent assessment and interventions, and/or is unstable with conditions that pose a significant threat to life or risk of prolonged impairment. There are 1 or more vital organ systems, documented above, involved with risk of life-threatening deterioration. I have spent: 120 minutes discontinuously at the bedside providing  critical care. This time does not represent time-performing procedures.

## 2024-11-12 NOTE — SIGNIFICANT EVENT
After discussion of CT findings and likely prolonged course of treatment moving forward with the wife, pulmonary and Dr. Martinez, the decision was made by the wife to no longer take extraordinary measures. Stating he would not want to live this way for a long period of time. He was made a DNRCC. TOD called by Dr. Martinez @ 1526

## 2024-11-12 NOTE — PROGRESS NOTES
ECMO Daily Note  ECMO Type: Veno-arterial (V-A)     ECMO Cannulation Peripheral Femoral vein;Left (Active)   Site Assessment Clean;Dry;Intact 11/11/24 2000   Line Securement Line secured in 2 locations;Securement device;Sutures 11/11/24 2000   All Connections Secured by Tie Bands Yes 11/11/24 2000   Cannula Marked in 2 Locations to Prevent Migration On skin directly next to the end point of the wire-reinforcement 11/11/24 2000   Cannulation Site Intervention None 11/11/24 2000   Dressing Type Transparent 11/11/24 2000   Dressing Status Clean;Dry;Occlusive 11/11/24 2000   Dressing Intervention New dressing 11/11/24 0000   Number of days: 0       ECMO Cannulation Peripheral Left;Femoral artery (Active)   Site Assessment Clean;Dry;Intact 11/11/24 2000   Line Securement Line secured in 2 locations;Securement device;Sutures 11/11/24 2000   All Connections Secured by Tie Bands Yes 11/11/24 2000   Cannula Marked in 2 Locations to Prevent Migration On skin directly next to the end point of the wire-reinforcement 11/11/24 2000   Cannulation Site Intervention None 11/11/24 2000   Dressing Type Transparent 11/11/24 2000   Dressing Status Clean;Dry;Occlusive 11/11/24 2000   Dressing Intervention New dressing 11/11/24 0000   Number of days: 0     Primary Indication: cardiogenic shock    ECMO settings     Circuit Flow (RPM): 4285  Sweep Gas Flow Rate (L/min): 6  FiO2 (%): 100 %  Day: 1    Ventilatory Support  Vent Mode: Pressure control/assist control  Vt (Set, mL): 480 mL  FiO2 (%): 100 %  PEEP/CPAP (cm H2O): 12 cm H20  Resp Rate (Set): 10    Assessment & Plan  - Continue maximal MCS for hypoxemic respiratory failure and cardiogenic shock; rest settings on vent  - Pharmacologic LV venting with adequate pulsatility  - Therapeutic anticoagulation on hold d/t airway bleeding d/t c/f tracheal injury

## 2024-11-12 NOTE — SIGNIFICANT EVENT
Given transition to comfort care pulmonary will sign-off. Please let us know if there are any further questions/concerns of which we can be of assistance.

## 2024-11-12 NOTE — SIGNIFICANT EVENT
DEATH PRONOUNCEMENT    Called to bedside by nursing    On arrival patient unresponsive to voice and painful stimuli  Pupils fixed  Asystole on monitors  Absent heart sounds  No spontaneous respirations    Time of Death: 1437    Family Notified.

## 2024-11-12 NOTE — CONSULTS
Reason For Consult  Concern for tracheal laceration, possible tracheoesophageal fistula     History Of Present Illness  Carl Vazquez is a 59 y.o. male with history of CABG x 4, AV valve replacement, right thoracotomy with pleurodesis for recurrent pleural effusions, NSTEMI with PCI to RCA due to RCA graft failing, Hodgkin's lymphoma s/p mediastinal radiation and chemotherapy, HTN, T2DM, DVT, HTN who initially presented to [facility] on 11/7 with weakness and cough. CTA showed likely pulmonary emboli, RV strain, and a loculated R pleural effusion. He acutely decompensated on 11/10 requiring intubation subsequent left femoral VA ECMO cannulation. He was then transferred to [facility] CTICU. Shortly after arrival to CTICU CXR was concerning for opacification of the right lung in the setting of trouble pulling adequate tidal volumes. 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 Thoracic surgery was consulted for further management recommendations.   This morning he is down on his pressor requirements, now on 0.1 epinephrine. ET tube was repositioned yesterday, vent settings now 100% FiO2, PEEP 10.      Past Medical History  He has a past medical history of Aortic valve disorder (01/06/2015), Basal cell carcinoma (01/22/2024), Chest pain (01/22/2024), Chronic systolic heart failure (01/22/2024), Coronary arteriosclerosis (01/22/2024), Essential hypertension (01/22/2024), Hodgkin lymphoma (01/22/2024), replacement of aortic valve (01/22/2024), Mixed hyperlipidemia (02/23/2005), Pleural effusion (01/22/2024), Shortness of breath (01/22/2024), Type 2 diabetes mellitus without complication (Multi) (08/02/2023), and Venous thrombosis (01/22/2024).    Surgical History  He has a past surgical history that includes CT guided chest tube placement (7/25/2018).     Social  "History  He reports that he has never smoked. He has never been exposed to tobacco smoke. He has never used smokeless tobacco. He reports current alcohol use of about 6.0 standard drinks of alcohol per week. He reports that he does not use drugs.    Family History  Family History   Problem Relation Name Age of Onset    No Known Problems Mother      No Known Problems Father          Allergies  Ativan [lorazepam] and Phenobarbital    Review of Systems  Unable to assess given patient's clinical status     Physical Exam  Gen - critically ill   HEENT - EEG in place, ET tube in place, OG tube in place, LIJ in place   Resp - intubated PEEP 10 FiO2 100%  CV - tachycardic low 100's  Chest - sternotomy scar  Abd - upper midline scar   Ext - cool      Last Recorded Vitals  Blood pressure 85/59, pulse 99, temperature 36 °C (96.8 °F), resp. rate 10, height 1.803 m (5' 10.98\"), weight 72.4 kg (159 lb 9.8 oz), SpO2 98%.    Relevant Results  WBC - 54 (from 56.6)  Hgb - 9.3  ABG - 7.54/34/73/29.1  T bili 5.9  ALT - 1166  AST - 4588  Lactate - 4.7  Troponin - 40,266   Blood cultures 11/7 - NGTD   MRSA + on nasal swab    Assessment/Plan   Carl Vazquez is a 59 y.o. male with history of CABG x 4, AV valve replacement, right thoracotomy with pleurodesis for recurrent pleural effusions, NSTEMI with PCI to RCA due to RCA graft failing, Hodgkin's lymphoma s/p mediastinal radiation and chemotherapy, HTN, T2DM, DVT, HTN who initially presented to [facility] on 11/7 with weakness and cough. CTA showed likely pulmonary emboli, RV strain, and a loculated R pleural effusion. He acutely decompensated on 11/10 requiring intubation subsequent left femoral VA ECMO cannulation. He was then transferred to [facility] CTICU. Shortly after arrival to CTICU CXR was concerning for opacification of the right lung in the setting of trouble pulling adequate tidal volumes. 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 Thoracic surgery was consulted for further management recommendations.     Recommendations:   - Patient transitioned to comfort measures only shortly after being seen by thoracic surgery team  - Thoracic surgery available for any questions/concerns    Discussed with attending Dr. Davis.     Aria Delacruz MD  PGY2 General Surgery   Thoracic Surgery

## 2024-11-12 NOTE — PROGRESS NOTES
"CTICU Progress Note  Carl Vazquez/[id number]    Admit Date: 11/10/2024  Hospital Length of Stay: 2   ICU Length of Stay: 1d 16h   CT SURGEON: Dr. Cisneros    SUBJECTIVE:   Overnight remained on 100% on vent. Weaned of vaso, angio and, levo. Just on epi 0.1. no chest rise and minimal ventilation    MEDICATIONS  Infusions:  EPINEPHrine, Last Rate: Stopped (11/12/24 1400)  epoprostenol, Last Rate: 0.05 mcg/kg/min (11/12/24 0459)  fentaNYL, Last Rate: 100 mcg/hr (11/12/24 1400)  [Held by provider] heparin, Last Rate: 1,300 Units/hr (11/11/24 1600)  insulin regular, Last Rate: Stopped (11/12/24 1230)  norepinephrine, Last Rate: Stopped (11/12/24 0515)  propofol, Last Rate: 50 mcg/kg/min (11/12/24 1406)  vasopressin, Last Rate: Stopped (11/11/24 1945)      Scheduled:  [Held by provider] acetaminophen, 650 mg, q4h   Or  [Held by provider] acetaminophen, 650 mg, q4h  [Held by provider] aspirin, 81 mg, Daily  aspirin, 150 mg, Daily  fluconazole, 200 mg, q24h  heparin, 5,000 Units, q8h  hydrocortisone sodium succinate, 50 mg, q6h  [Held by provider] levothyroxine, 25 mcg, Daily before breakfast  meropenem, 1 g, q12h  oxygen, , Continuous - Inhalation  pantoprazole, 40 mg, Daily before breakfast   Or  pantoprazole, 40 mg, Daily before breakfast  [Held by provider] polyethylene glycol, 17 g, Daily  [Held by provider] sennosides-docusate sodium, 2 tablet, BID      PRN:  calcium gluconate, 1 g, q6h PRN  calcium gluconate, 2 g, q6h PRN  dextrose 10 % in water (D10W), 20 mL/hr, PRN  dextrose, 10-50 mL, q15 min PRN  glucagon HCL, 1 mg, q15 min PRN  HYDROmorphone, 0.2 mg, q15 min PRN  magnesium sulfate, 2 g, q6h PRN  magnesium sulfate, 4 g, q6h PRN  naloxone, 0.2 mg, q5 min PRN  potassium chloride, 20 mEq, q6h PRN  potassium chloride, 40 mEq, q6h PRN  vancomycin, , Daily PRN        PHYSICAL EXAM:   Visit Vitals  BP 85/59   Pulse (!) 0   Temp 36 °C (96.8 °F)   Resp (!) 0   Ht 1.803 m (5' 10.98\")   Wt 72.4 kg (159 lb 9.8 oz)   SpO2 (!) " 0%   BMI 22.27 kg/m²   Smoking Status Never   BSA 1.9 m²     Wt Readings from Last 5 Encounters:   11/11/24 72.4 kg (159 lb 9.8 oz)   11/10/24 72.4 kg (159 lb 9.8 oz)   10/31/24 79.4 kg (175 lb)   10/07/24 79.8 kg (176 lb)   10/07/24 79.4 kg (175 lb)     INTAKE/OUTPUT:  I/O last 3 completed shifts:  In: 7222.3 (99.8 mL/kg) [I.V.:4936.5 (68.2 mL/kg); Blood:500; IV Piggyback:1785.8]  Out: 1680 (23.2 mL/kg) [Urine:1650 (0.6 mL/kg/hr); Emesis/NG output:30]  Weight: 72.4 kg          Vent settings:  Vent Mode: Volume control/assist control  FiO2 (%):  [100 %] 100 %  S RR:  [10] 10  PEEP/CPAP (cm H2O):  [10 cm H20-12 cm H20] 10 cm H20  PIP Set (cm H2O):  [20 cm H2O-32 cm H2O] 20 cm H2O  MAP (cm H2O):  [11-17] 12    Physical Exam:   - CONSTITUTION: Critically ill male in ICU bed intubated and sedated.   - NEUROLOGIC: ODESSA, on anesthesia. Pupils equal and reactive  - CARDIOVASCULAR: Afib in the low 100's  - RESPIRATORY: minimal breath sounds. Difficulty ventilating.   - GI: Soft nondistended  - : clear yellow urine in lowe  - EXTREMITIES: generalized edema, pulses palpable  - SKIN: warm  - PSYCHIATRIC: ODESSA    Daily Risk Screen  Intubated:Yes, hemodynamic instability  Central line: Yes, vasoactive medications  Lowe: Yes, critical I/O    Images: Right chest white out.     Invasive Hemodynamics:    Most Recent Range Past 24hrs   BP (Art) 80/65 Arterial Line BP 1  Min: 73/58  Max: 109/86   MAP(Art) 69 mmHg Arterial Line MAP 1 (mmHg)   Min: 63 mmHg  Max: 92 mmHg     Assessment/Plan   Assessment:  Carl CONTRERAS Vazquez is a 59 y.o. year old male patient with Past Medical History of CABG x 4 with pulmonary quick location/AV valve replacement, history of thoracotomy with pleurodesis for recurrent pleural effusions, NSTEMI 2020 with PCI to RCA due to RCA graft failing, patient is on antibiotics for leukocytosis, history of Hodgkin's lymphoma greater than 40 years ago, hypertension, type II DM, history of venous thrombus, and  hypertension presented to [facility] emergency department for shortness of breath that has been ongoing for the previous month.Course c/b hypoxic respiratory failure necessitating intubation and hemodynamic instability.Given patient's unstable status in setting of cardiogenic (acutely reduced EF and RV function) vs septic shock vs obstructive (potential PE), shock call placed and decision made to cannulate patient for VA ECMO. Patient now presenting s/p percutaneous left femoral VA ECMO with reperfusion cannula with Dr. Agarwal.      Plan:  NEURO:  No Pmhx. Patient is intubated and sedated on propofol infusion. Paralyzed on cisatricurium gtt to help with ventilation -->  - EEG ordered for abnormal leg jerking movement- no evidence of seizure activity, only diffuse encephalopathy  - Serial neuro and pain assessments   - Stop cisatricurium gtt and SAT  - Continue propofol and fentanyl gtt   - Scheduled Tylenol   - PRN oxycodone  - PRN dilaudid for pain   - PT Consult  - CAM ICU score qshift  - Sleep/wake cycle hygiene  - Hold home Paroxetine     CV:  Patient has a history of CABG x 4 with pulmonary quick location/AV valve replacement, NSTEMI 2020 with PCI to RCA due to RCA graft failing, HTN Is now status post VA ECMO cannulation 11/10 Flows 5L, sweep 6, FIO2 100%. TTE  11/10 s/f EF 20-25%, reduced RV function.  On Epi, Lactic acidosis improving -->  - Epi down to 0.08 and maintain  - Plan for impella 5.5 on Wednesday for venting w/ Dr. Cisneros  - Maintain goal MAP >70  - Mixed venous and CI Q4H  - Volume resuscitate as clinically indicated  - Hold home Atorvastatin, lisinopril, torsemide, metoprolol     ECMO:      Most Recent Range Past 24hrs   Flow 4.7 Patient Flow (L/min)  Min: 4.7  Max: 4.85   Speed 3925 Circuit Flow (RPM)  Min: 3925  Max: 3995   Sweep 1 Sweep Gas Flow Rate (L/min)  Min: 1  Max: 2         PULM:  History of thoracotomy with pleurodesis for recurrent pleural effusions.Pt recently presenetd with SOB  and was given 2 courses of doxycycline and azithromycin. OSH, pt had acute hypoxic respiratory failure and was intubated. Possible PE appreciated on CT PE 11/9. CXR s/f opacification R lung, hemithorax. Currently remains intubated on ventilator unable to get title volumes. Bronch by CTICU staff concerning for some tissue obstruction. Bronch from Pulm intervention concerning for possible traumatic injury with fistula-ET tube advanced past fistula. Still unable to ventilate this morning. CT scan with horrific changes. Blood suctioned out of airway -->  - CT chest shows complete occulusion and collapse of distal and right mainstem bronchus lobes with minimal aeration. Please see CT scan  - iEpo 0.05  - Interventional pulm following  - Thoracic consult  - Daily CXR  - Wean FiO2 maintaining SpO2 >92%.         GI: OG in place. Acute liver shock with elevated LFT. Possible colitis on CT scan -->  - Continue PPI   - RUQ ultrasound  - Daily LFT  - Place corpak tomorrow  - NPO, will perform bedside swallow eval post extubation   - Colace/senna BID and miralax BID     : No history of renal disease, baseline creatinine 1.1. CHRIS-urine output improved with fluid resuscitation.. Lowe in place and making adequate UOP. -->  - Continue lowe catheter for strict I/Os.  - Goal UOP 0.5ml/kg/hr  - RFP as clinically indicated  - Replete electrolytes per CTICU protocol  - Nephrology consult     ENDO:  PMH of T2DM and hypothyroidism. A1c: 6.7 Stress hyperglycemia-->  - Maintain BG <180, insulin per CTICU protocol  - Insulin ggt  - hold home: metformin.     HEME:  Hx of venous thrombus. Pt on plavix at home.  C/f PE on CT. LDH >3000-->    - Monitor drain output volume and characteristics  - Hold heparin gtt in the setting of hemithorax, re-evaluate \  - CBC, coags, and fibrinogen post op and as clinically indicated  - On heparin ggt  - SCDs for DVT prophylaxis.  - Last type and screen: 11/11     ID:  Leukocytosis as high as 63- concern  for malignancy?? MRSA positive on 11/11. Respiratory culture from 11/8 s/f gram positive cocci, pairs and chains-->  - Vancomycin, Meropenem, Fluconazole  - Trend temp q4h  - Resp cx from bronch, Resp panel     Skin:  No active skin issues.  - preventative Mepilex dressings in place on sacrum and heels  - change preventative Mepilex weekly or more frequently as indicated (when moist/soiled)   - every shift skin assessment per nursing and weekly ICU skin rounds  - moisture barrier to be applied with nannette care  - active skin problems addressed with nursing on daily rounds     Proph:  SCDs  PPI  Subcutaneous heparin     G:  Line  Left IJ triple lumen placed 11/10  Right axillary a-line placed 11/11  Baugh 11/11     F: Family: will update at bedside postoperatively.     A,B,C,D,E,F,G: reviewed        Restraints: The indications and risks/benefits of non-violent/non self-destructive restraints were discussed and are indicated for the safety of the patient.    Code status: DNR Comfort Measures Only  Emergency contact: [email or web address]      I personally spent  70 minutes of critical care time directly and personally managing the patient exclusive of separately billable procedures.     A,B,C,D,E,F,G: reviewed     Dispo: CTICU care for now.    CTICU TEAM PHONE [phone]

## 2024-11-12 NOTE — SIGNIFICANT EVENT
BRIEF CTICU NOTE    Multidisciplinary discussion with Dr. Garcia of Interventional Pulmonology team, Dr. Jones of Thoracic Surgery team, Dr. Cisneros of Cardiac Surgery team (primary attending), and CTICU team.    Based on the visualized airway injury, best course of action is to secure an airway below the level of the tracheal lesion and a gastric tube for enteral nutrition. He would likely be dependent on both for a prolonged period of time. He remains critically ill, currently maximally supported hemodynamically on pVA-ECMO, with BTR, however recovery is significantly hampered by inability to oxygenate and ventilate due to severe lung pathology. Overall, prognosis is poor with high risk for morbidity and mortality and progressive multiorgan dysfunction.    This was discussed with the patient's wife and surrogate decision maker Adamaris Vazquez, and she did not believe that this would be an acceptable quality of life for the patient and she requested transitioning to comfort measures. I explained what would be involved which includes treating any pain, anxiety, agitation and discontinuing mechanical support; I expressed that I anticipated patient would pass on a scale of seconds-minutes, and she requested some additional time to allow for family members to come see the patient, which I stated we would be happy to facilitate.    Will plan to turn off MCS once patient's wife and family are ready to withdraw care, anticipated later today.    Sam Martinez MD, MS  Critical Care Medicine  Department of Anesthesiology

## 2024-11-12 NOTE — PROGRESS NOTES
CTICU Daily Progress Note    Carl Vazquez is a 59 y.o. male with PMH relevant for CABGx4 2007 & Redo CABG x1 & AVRt, NSTEMI 2020 w/ PCI to RCA d/t graft failure, Recurrent pleural effusion s/p R pleurodesis, HL w/ chemo/XRT (remote), HTN, T2DM, VTE who presented to [facility] ED for dyspnea, diagnosed w/ PE and acutely decompensated 11/10 req pVA ECMO cannulation w/ Dr. Agarwal on 11/10.    Briefly, patient's symptoms started 10/7 with worsening productive cough, dyspnea. Z-latha course without improvement and more debility. Presented to ED with w/ workup notable for leukocytosis, CHRIS, elevated BNP. Chest imaging concerning for PE w/in distal R main PA and segmental arteries of RML, RLL; R heart strain; consolidative opacities in RML, RLL w/ possible superimposed malignancy. He was started on ABX, Heparin w/ plans for bronchoscopy @ [facility] on 11/11.     On 11/10, patient acute decompensated with respiratory distress and worsening hypotension and acidosis. Shock team notified and he was started on high-dose vasopressors and cannulated on L fem-fem pVA ECMO w/ DPC and transferred to . TTE w/ severe BiV dysfunction, mild AI, mod-severe MR.    Admission course has been significant for tracheal injury and worsening AHRF    24 Hour Events:  - Vasoactives weaned down  - Persistent hypoxemia  - Bronch this AM revealing dislodgment of ETT    Objective     Vitals:  24hr Min/Max:  Temp  Min: 35.5 °C (95.9 °F)  Max: 36.8 °C (98.2 °F)  Pulse  Min: 99  Max: 121  BP  Min: 85/59  Max: 85/59  Resp  Min: 10  Max: 10  SpO2  Min: 84 %  Max: 98 %    I/O:  I/O last 2 completed shifts:  In: 4781.7 (66 mL/kg) [I.V.:3581.7 (49.5 mL/kg); IV Piggyback:1200]  Out: 1450 (20 mL/kg) [Urine:1420 (0.8 mL/kg/hr); Emesis/NG output:30]  Weight: 72.4 kg     Hemodynamic parameters for last 24 hours:  CVP:  [11 mmHg-60 mmHg] 14 mmHg      Vent settings:  Vent Mode: Volume control/assist control  FiO2 (%):  [100 %] 100 %  S RR:  [10] 10  PEEP/CPAP  (cm H2O):  [10 cm H20-12 cm H20] 10 cm H20  PIP Set (cm H2O):  [20 cm H2O-32 cm H2O] 20 cm H2O  MAP (cm H2O):  [11-17] 12    LDA:  ECMO Cannulation Peripheral Femoral vein;Left (Active)   Placement Date/Time: 11/10/24 2200   ECMO Type: Peripheral  Cannulation Site: Femoral vein;Left  Line Securement: Line secured in 2 locations;Sutures;Securement device   Number of days: 0       ECMO Cannulation Peripheral Left;Femoral artery (Active)   Placement Date/Time: 11/10/24 2200   ECMO Type: Peripheral  Cannulation Site: Left;Femoral artery   Number of days: 0       CVC 11/10/24 Triple lumen Non-tunneled Left Internal jugular (Active)   Placement Date/Time: 11/10/24 1530   Hand Hygiene Performed Prior to CVC Insertion: Yes  Site Prep: Chlorhexidine   Site Prep Agent has Completely Dried Before Insertion: Yes  All 5 Sterile Barriers Used (Gloves, Gown, Cap, Mask, Large Sterile Drape):...   Number of days: 1       Arterial Line 11/10/24 Right Axillary (Active)   Placement Date/Time: 11/10/24 1830   Hand Hygiene Completed: Yes  Orientation: Right  Location: Axillary  Site Prep: Chlorhexidine   Local Anesthetic: None  Technique: Ultrasound guidance  Placed by: Dean Dorantes  Insertion attempts: 1  Securement Meth...   Number of days: 1       ETT  8 mm (Active)   Placement Date/Time: 11/10/24 1405   Placed by External Staff?: Other (Comment)  Hand Hygiene Completed: Yes  Technique: Video laryngoscopy  ETT Type: ETT - single  Single Lumen Tube Size: 8 mm  Cuffed: Yes  Laryngoscope: Jl  Blade Size: 3  Loc...   Number of days: 1       Urethral Catheter Non-latex 16 Fr. (Active)   Placement Date/Time: 11/10/24 1430   Placed by: ERIN Marsh RN  Hand Hygiene Completed: Yes  Catheter Type: Non-latex  Tube Size (Fr.): 16 Fr.  Catheter Balloon Size: 10 mL  Urine Returned: (c) Yes   Number of days: 1       NG/OG/Feeding Tube OG - [place] sump Left mouth (Active)   Placement Date/Time: 11/10/24 1408   Placed by: Dr. Frye  Hand  Hygiene Completed: Yes  Type of Tube: NG/OG Tube  Tube Type: OG - [place] sump  Tube Location: Left mouth   Number of days: 1         Labs and radiographic studies reviewed    Assessment/Plan     Carl Vazquez was seen and examined in the ICU. This includes review of the history, physical, ICU flow sheets, laboratory and radiographic data, medication administration record and other current clinical data.  I have seen the patient independently and reviewed the plan with the house staff, advanced practice provider, nursing team, respiratory therapy, primary service, consultants, and patient and/or family members.  This note serves to document my findings and time spent. My key findings, diagnoses and plans for ICU management today include the following:    Problem List:  Patient Active Problem List   Diagnosis    Abnormal liver diagnostic imaging    High alkaline phosphatase    Acute cough    Abdominal pain    Chest pain    Lumbago    Anxiety    Aortic valve disorder    Coronary arteriosclerosis    Basal cell carcinoma    Blood glucose abnormal    Chronic systolic heart failure    DM (diabetes mellitus) (Multi)    Dyslipidemia    Elbow pain    Electrocardiogram abnormal    Erythropoietic porphyria (Multi)    Essential hypertension    Finger infection    Gastroesophageal reflux disease    Heart disease    Low blood pressure    Heart failure    Hodgkin lymphoma    Hyperbilirubinemia    Mixed hyperlipidemia    Ileitis    Incisional hernia    Jaundice    Lung mass    Pleural effusion    Pneumonia    Postoperative pain    Pulmonary hypertension (Multi)    Shortness of breath    Goiter    Thyromegaly    Type 2 diabetes mellitus without complication (Multi)    Venous thrombosis    Hx of replacement of aortic valve    Acute pulmonary embolism with acute cor pulmonale (Multi)    NSTEMI (non-ST elevated myocardial infarction) (Multi)    Acute renal failure (ARF) (CMS-HCC)    Moderate protein-calorie malnutrition (Multi)     Acute cholecystitis    QT prolongation    Colitis    Empyema, right (Multi)    Pneumonia of right lower lobe due to infectious organism    Pleural effusion on right    Pulmonary abscess (Multi)    Leukocytosis    Alkaline phosphatase elevation    Prediabetes    Cardiogenic shock (Multi)        ICU Diagnoses requiring active management:  - Acute hypoxic respiratory failure  - Acute cardiac insufficiency requiring inotropic support  - Hypotension requiring vasopressor support  - Anemia of blood loss  - Stress hyperglycemia    Attending Recommendation:     NEURO:  #Pain/sedation  #Delirium  - On prop & fent gtt d/t Nimbex gtt d/t inability to ventilate; need neuro exam once Nimbex weaned  - vEEG w/ severe diffuse encephalopathy; ok to dc  - CT Head today    CV:  #PMH of CABG x4 w/ redo CABGx1 SVG to RCA, NSTEMI w/ PCI x2 to RCA graft in 2020  #Cardiogenic shock on pVA ECMO 11/10  #Vasoplegia  - Patient w/ suspected PE dx on CT-A 11/7, with worsening decompensation 11/10 req pVA ECMO  - L fem-fem pVA ECMO w/ DPC, currently requiring full support due to inability to oxygenate via ETT  - Continue Levo, Vaso, AT2 for MAP > 65; weaned off significantly over 24h  - Continue SDS w/ Hydrocortisone 50 q6  - Currently deferring PAC d/t c/f unstable/worsening PE, will evaluate clot burden on CT-A today  - Daily hemolysis labs for ECMO  - Currently adequate pulsatility of ~20mmHg w/ medical venting with inotropy  - TTE 11/11 severe BiV, mild AI, mod-severe MR, estimated RVSP 36mmHg  - Continue central venous catheter and arterial line     PULM:  #Acute hypoxic respiratory failure  #C/f TEF d/t posterior tracheal wall injury  - Patient bronch on admission 11/10 with no airway abnormality noted, mild secretions with slightly friable mucosa  - 11/11 AM w/ acute inability to ventilate w/ elevated PIP, persistent opacification of R hemithorax with bloody secretions, repeat bronch w/ concern for abnormal airway tissue vs clot d/t  bleed  - Pulm & IP consulted, bronch by IP w/ c/f posterior tracheal wall injury w/ high concern for TEF; poor aeration of lungs on chest imaging, will plan to place lung on PC 10/10 for rest settings on MV  - CTA of chest today to evaluate for clot burden and assess lung parenchymal injury  - Discussed with Thoracic Surgery & Interventional Pulm; limited therapeutic options for current pathology in the setting of severe cardiopulmonary failure. Patient would need secure ETT distal to area of injury, which is significant, and G-tube for feeds/venting. Patient would not be an operative candidate until he is weaned off ECMO, which itself is hampered by severe bilateral lung injury    GI:  #Nutrition  #TEF  - Strict NPO for now  - Stress ulcer ppx w/ IV PPI  - RUQ for elevated LFTs, INR, and hyperbilirubinemia suspected d/t shock liver; will defer further workup    RENAL:  #Fluid and electrolyte abnormalities  #CHRIS  - Monitor UOP with strict I/O while ICU  - CTM BMP with goal K > 4, Mg > 2 while in ICU    ENDO:  #Hyperglycemia  - Insulin drip for hyperglycemia, switch to SSI for goal BG < 180 as able    ID:  #C/f PNA  - BSABX w/ Vanc, Shyann, Kathy  - F/U cultures, negative thus far    HEME:  #Perioperative anemia  #Perioperative coagulopathy  - No need for allotransfusion at this time, will continue to monitor  - Deferring Heparin gtt due to c/f airway bleeding; continue to re-evaluate needs daily  - SQH for DVT PPX    MSK/SKIN:  - Continue daily evaluation for prevention of SSI/PI    PPX:  - SCD, SQH  - PPI for SUP    TLD:  - CVC, Arterial Line, Baugh    DISPO: Patient has poor prognosis and likely severely limited quality of life if he were to survive this injury. This was discussed with the wife extensively by a multidisciplinary team. After GOC discussion, she felt that patient would not want to be debilitated and decided to transition to comfort measures. Please see separate note by me regarding details of that  discussion      Sam Martinez MD, MS    Division of Critical Care Medicine  Department of Anesthesiology      Patient's condition remains critical, requires frequent assessment and interventions, and/or is unstable with conditions that pose a significant threat to life or risk of prolonged impairment. There are 1 or more vital organ systems, documented above, involved with risk of life-threatening deterioration. I have spent: 80 minutes discontinuously at the bedside providing critical care. This time does not represent time-performing procedures.

## 2024-11-12 NOTE — PROGRESS NOTES
ECMO Daily Note  ECMO Type: Veno-arterial (V-A)     ECMO Cannulation Peripheral Femoral vein;Left (Active)   Site Assessment Clean;Dry;Intact 11/11/24 2000   Line Securement Line secured in 2 locations;Securement device;Sutures 11/11/24 2000   All Connections Secured by Tie Bands Yes 11/11/24 2000   Cannula Marked in 2 Locations to Prevent Migration On skin directly next to the end point of the wire-reinforcement 11/11/24 2000   Cannulation Site Intervention None 11/11/24 2000   Dressing Type Transparent 11/11/24 2000   Dressing Status Clean;Dry;Occlusive 11/11/24 2000   Dressing Intervention New dressing 11/11/24 0000   Number of days: 0       ECMO Cannulation Peripheral Left;Femoral artery (Active)   Site Assessment Clean;Dry;Intact 11/11/24 2000   Line Securement Line secured in 2 locations;Securement device;Sutures 11/11/24 2000   All Connections Secured by Tie Bands Yes 11/11/24 2000   Cannula Marked in 2 Locations to Prevent Migration On skin directly next to the end point of the wire-reinforcement 11/11/24 2000   Cannulation Site Intervention None 11/11/24 2000   Dressing Type Transparent 11/11/24 2000   Dressing Status Clean;Dry;Occlusive 11/11/24 2000   Dressing Intervention New dressing 11/11/24 0000   Number of days: 0     Primary Indication: cardiogenic shock    ECMO settings     Circuit Flow (RPM): 4115  Sweep Gas Flow Rate (L/min): 4  FiO2 (%): 100 %  Day: 1    Ventilatory Support  Vent Mode: Volume control/assist control  Vt (Set, mL): 480 mL  FiO2 (%): 100 %  PEEP/CPAP (cm H2O): 10 cm H20  Resp Rate (Set): 10    Assessment & Plan  - Continue maximal MCS for hypoxemic respiratory failure and cardiogenic shock; rest settings on vent; after multiple discussions with Thoracic Surgery and Interventional Pulmonology, patient's tracheal injury will likely require prolonged tracheostomy and gastrostomy; severe lung injury on Chest CT that would substantially affect ability to wean MCS. This was discussed  with patient's wife who believes that this would not be in line with patient's goals of care; will transition to CMO today with plans to discontinue MCS  - Pharmacologic LV venting with adequate pulsatility  - Therapeutic anticoagulation on hold d/t airway bleeding d/t c/f tracheal injury

## 2024-11-12 NOTE — PROGRESS NOTES
Pharmacy to Dose Vancomycin:  Carl Vazquez is a 59 y.o. male ordered pharmacy to dose vancomycin for  MRSA infection . Today is day 2 of therapy.  Goal: Trough 15-20mg/L  Results from last 7 days   Lab Units 11/12/24  0210 11/12/24  0006 11/11/24  1954 11/11/24  1433 11/09/24  0509 11/08/24  0445   BUN mg/dL 31*  --  29* 28*   < > 28*   CREATININE mg/dL 2.55*  --  2.39* 2.29*   < > 1.20   WBC AUTO x10*3/uL 54.0*  --  56.6* 58.2*   < > 17.5*   VANCOMYCIN RM ug/mL  --   --   --   --   --  35.9*   VANCOMYCIN TR ug/mL  --  13.4  --   --   --   --     < > = values in this interval not displayed.      Blood Culture   Date/Time Value Ref Range Status   11/07/2024 07:31 PM No growth at 4 days -  FINAL REPORT  Final   11/07/2024 07:31 PM No growth at 4 days -  FINAL REPORT  Final     Gram Stain   Date/Time Value Ref Range Status   11/11/2024 02:11 AM (4+) Abundant Polymorphonuclear leukocytes  Preliminary   11/11/2024 02:11 AM No organisms seen  Preliminary      No results found for the last 90 days.    Antibiotics: Fluconazole (Day 2), Merrem (Day 2).  Pertinent Medications: Giapreza, epi drip, norepi drip, contrast, vasopressin drip.  Renal function is declining.  Level today is 13.4 drawn roughly 24hrs after vanco 1G dose.    Plan:  Give vanco 1.25G x 1 dose.  Follow-up level ordered for 11/13 @ 0800 unless clinically indicated sooner.  Pharmacy will continue daily vancomycin monitoring. Please contact the pharmacy with any questions.    Thank you,  Asher Romero RPh

## 2024-11-12 NOTE — DISCHARGE SUMMARY
Discharge Diagnosis  Cardiogenic shock (Multi)    Test Results Pending At Discharge  Pending Labs       Order Current Status    CBC Collected (11/12/24 0801)    Magnesium Collected (11/12/24 0801)    Renal Function Panel Collected (11/12/24 0801)    AUTOPSY EXAM BODY ONLY In process    Blood Gas Lactic Acid, Venous In process    Calcium, Ionized In process    Respiratory Viral Panel In process    Surgical Pathology Exam In process    Respiratory Culture/Smear Preliminary result            Hospital Course   Carl Vazquez is a 59 y.o. year old male patient with Past Medical History of CABG x 4 with pulmonary quick location/AV valve replacement, history of thoracotomy with pleurodesis for recurrent pleural effusions, NSTEMI 2020 with PCI to RCA due to RCA graft failing, patient is on antibiotics for leukocytosis, history of Hodgkin's lymphoma greater than 40 years ago, hypertension, type II DM, history of venous thrombus, and hypertension presented to [facility] emergency department for shortness of breath that has been ongoing for the previous month.Course c/b hypoxic respiratory failure necessitating intubation and hemodynamic instability.Given patient's unstable status in setting of cardiogenic (acutely reduced EF and RV function) vs septic shock vs obstructive (potential PE), shock call placed and decision made to cannulate patient for VA ECMO. Patient now presenting s/p percutaneous left femoral VA ECMO with reperfusion cannula with Dr. Agarwal.   11/11 significant tracheal bleeding with minimal ability to ventilate. Bronchex x3 including 2 hours with cryobronch by pulmonary with no success. 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 Possible colitis. After family discussion the patient became a DNRCC. TOD 1437    Pertinent Physical Exam At Time of Discharge  Physical Exam  Constitutional:        Comments: Sedated on fent and prop   HENT:      Head: Normocephalic.   Eyes:      Comments: Pupils equal and reactive   Abdominal:      General: Abdomen is flat.   Musculoskeletal:         General: Swelling present.   Skin:     Capillary Refill: Capillary refill takes more than 3 seconds.   Neurological:      Comments: ODESSA         Home Medications     Medication List      ASK your doctor about these medications     aspirin 81 mg EC tablet   atorvastatin 80 mg tablet; Commonly known as: Lipitor; Take 1 tablet (80   mg) by mouth once daily.   azithromycin 250 mg tablet; Commonly known as: Zithromax; Take 2 tablets   (500 mg) by mouth once daily for 1 day, THEN 1 tablet (250 mg) once daily   for 4 days. Take 2 tabs (500 mg) by mouth today, than 1 daily for 4   days..; Start taking on: October 31, 2024; Ask about: Should I take this   medication?   clopidogrel 75 mg tablet; Commonly known as: Plavix; TAKE 1 TABLET DAILY   hydrOXYzine pamoate 25 mg capsule; Commonly known as: VistariL; Take 1   capsule (25 mg) by mouth 3 times a day as needed for anxiety for up to 10   days.   levothyroxine 25 mcg tablet; Commonly known as: Synthroid, Levoxyl; TAKE   1 TABLET DAILY IN THE MORNING ON AN EMPTY STOMACH   lisinopril 5 mg tablet   metFORMIN 500 mg tablet; Commonly known as: Glucophage   metoprolol tartrate 25 mg tablet; Commonly known as: Lopressor; TAKE 1   TABLET TWICE A DAY WITH FOOD   multivitamin tablet   pantoprazole 40 mg EC tablet; Commonly known as: ProtoNix; Take 1 tablet   (40 mg) by mouth once daily in the morning. Take before meals.   PARoxetine 10 mg tablet; Commonly known as: Paxil; TAKE 1 TABLET DAILY   IN THE MORNING   torsemide 100 mg tablet; Commonly known as: Demadex       Outpatient Follow-Up  Future Appointments   Date Time Provider Department Center   12/11/2024  8:45 AM Sage Cedillo MD MPH [id number] [facility]   1/27/2025  3:45 PM Andrea Hong MD [id number] [facility]       Jessy Hart  APRN-CNP

## 2024-11-12 NOTE — CONSULTS
"NEPHROLOGY NEW CONSULT NOTE   Carl Vazquez   59 y.o.    @WT@  MRN/Room: [id number]/04/04-A    Reason for consult: CHRIS    HPI:  Carl Vazquez is a 59 y.o. male admitted [facility] for AHRF, cardiogenic shock on pVA ECMO.   He presented in ED on 10/07 with worsening productive cough dyspnea and had a Z-latha course without improvement. He presented in ED with eorlup notable for leukocytosis, CHRIS, elevated BNP, imagining concerning for possible PE. He admitted, decompensated with respiratory distress and worsening hypotension and acidosis.  He was started on high dose vasopressors and cannulated on L femoral for pVA ECMO and transferred to . On TTE he has BiV dysfunction, mild AI and mod-severe MR.He had tracheal injury and worsening AHRF in last 24 hour with worsening oxygen requirements.     Nephrology is consulted for CHRIS and possible CRRT need.      In The ER: BP 85/59   Pulse 100   Temp 35.9 °C (96.6 °F)   Resp 10   Ht 1.803 m (5' 10.98\")   Wt 72.4 kg (159 lb 9.8 oz)   SpO2 95%   BMI 22.27 kg/m²      Past Medical History:   Diagnosis Date    Aortic valve disorder 01/06/2015    Basal cell carcinoma 01/22/2024    Chest pain 01/22/2024    Chronic systolic heart failure 01/22/2024    Coronary arteriosclerosis 01/22/2024    Essential hypertension 01/22/2024    Hodgkin lymphoma 01/22/2024    Hx of replacement of aortic valve 01/22/2024    Mixed hyperlipidemia 02/23/2005    Pleural effusion 01/22/2024    Shortness of breath 01/22/2024    Type 2 diabetes mellitus without complication (Multi) 08/02/2023    Venous thrombosis 01/22/2024      Past Surgical History:   Procedure Laterality Date    CT GUIDED CHEST TUBE PLACEMENT  7/25/2018    CT GUIDED CHEST TUBE PLACEMENT [facility]      Family History   Problem Relation Name Age of Onset    No Known Problems Mother      No Known Problems Father       Social History     Socioeconomic History    Marital status:      Spouse name: Not on file    Number " of children: Not on file    Years of education: Not on file    Highest education level: Not on file   Occupational History    Not on file   Tobacco Use    Smoking status: Never     Passive exposure: Never    Smokeless tobacco: Never   Substance and Sexual Activity    Alcohol use: Yes     Alcohol/week: 6.0 standard drinks of alcohol     Types: 6 Cans of beer per week    Drug use: Never    Sexual activity: Defer   Other Topics Concern    Not on file   Social History Narrative    Not on file     Social Drivers of Health     Financial Resource Strain: Patient Unable To Answer (11/11/2024)    Overall Financial Resource Strain (CARDIA)     Difficulty of Paying Living Expenses: Patient unable to answer   Food Insecurity: Patient Unable To Answer (11/11/2024)    Hunger Vital Sign     Worried About Running Out of Food in the Last Year: Patient unable to answer     Ran Out of Food in the Last Year: Patient unable to answer   Transportation Needs: Patient Unable To Answer (11/11/2024)    PRAPARE - Transportation     Lack of Transportation (Medical): Patient unable to answer     Lack of Transportation (Non-Medical): Patient unable to answer   Physical Activity: Patient Unable To Answer (11/11/2024)    Exercise Vital Sign     Days of Exercise per Week: Patient unable to answer     Minutes of Exercise per Session: Patient unable to answer   Stress: Patient Unable To Answer (11/11/2024)    [employer] - Occupational Stress Questionnaire     Feeling of Stress : Patient unable to answer   Social Connections: Patient Unable To Answer (11/11/2024)    Social Connection and Isolation Panel [NHANES]     Frequency of Communication with Friends and Family: Patient unable to answer     Frequency of Social Gatherings with Friends and Family: Patient unable to answer     Attends Taoist Services: Patient unable to answer     Active Member of Clubs or Organizations: Patient unable to answer     Attends Club or  Organization Meetings: Patient unable to answer     Marital Status: Patient unable to answer   Intimate Partner Violence: Patient Unable To Answer (2024)    Humiliation, Afraid, Rape, and Kick questionnaire     Fear of Current or Ex-Partner: Patient unable to answer     Emotionally Abused: Patient unable to answer     Physically Abused: Patient unable to answer     Sexually Abused: Patient unable to answer   Housing Stability: Patient Unable To Answer (2024)    Housing Stability Vital Sign     Unable to Pay for Housing in the Last Year: Patient unable to answer     Number of Times Moved in the Last Year: 0     Homeless in the Last Year: Patient unable to answer   Recent Concern: Housing Stability - High Risk (2024)    Housing Stability Vital Sign     Unable to Pay for Housing in the Last Year: Yes     Number of Times Moved in the Last Year: 0     Homeless in the Last Year: No       Allergies   Allergen Reactions    Ativan [Lorazepam] Agitation    Phenobarbital Hives and Rash        Medications Prior to Admission   Medication Sig Dispense Refill Last Dose/Taking    aspirin 81 mg EC tablet Take 1 tablet (81 mg) by mouth every other day.   Unknown    atorvastatin (Lipitor) 80 mg tablet Take 1 tablet (80 mg) by mouth once daily. 90 tablet 3 Unknown    [] azithromycin (Zithromax) 250 mg tablet Take 2 tablets (500 mg) by mouth once daily for 1 day, THEN 1 tablet (250 mg) once daily for 4 days. Take 2 tabs (500 mg) by mouth today, than 1 daily for 4 days.. 6 tablet 0     clopidogrel (Plavix) 75 mg tablet TAKE 1 TABLET DAILY 90 tablet 3 Unknown    hydrOXYzine pamoate (VistariL) 25 mg capsule Take 1 capsule (25 mg) by mouth 3 times a day as needed for anxiety for up to 10 days. (Patient not taking: Reported on 2024) 30 capsule 0 Not Taking    levothyroxine (Synthroid, Levoxyl) 25 mcg tablet TAKE 1 TABLET DAILY IN THE MORNING ON AN EMPTY STOMACH 90 tablet 0 Unknown    lisinopril 5 mg tablet Take  1 tablet (5 mg) by mouth once daily.   Unknown    metFORMIN (Glucophage) 500 mg tablet Take 0.5 tablets (250 mg) by mouth once daily in the evening. Take with meals. Do not crush, chew, or split.   Unknown    metoprolol tartrate (Lopressor) 25 mg tablet TAKE 1 TABLET TWICE A DAY WITH FOOD 180 tablet 3 Unknown    multivitamin tablet Take 1 tablet by mouth once daily.   Unknown    pantoprazole (ProtoNix) 40 mg EC tablet Take 1 tablet (40 mg) by mouth once daily in the morning. Take before meals. 90 tablet 3 Unknown    PARoxetine (Paxil) 10 mg tablet TAKE 1 TABLET DAILY IN THE MORNING 90 tablet 0 Unknown    torsemide (Demadex) 100 mg tablet Take 0.5 tablets (50 mg) by mouth once daily. Will take an addition dose if needed for water retention   Unknown        Meds:   [Held by provider] acetaminophen, 650 mg, q4h   Or  [Held by provider] acetaminophen, 650 mg, q4h  [Held by provider] aspirin, 81 mg, Daily  aspirin, 150 mg, Daily  fluconazole, 200 mg, q24h  heparin, 5,000 Units, q8h  hydrocortisone sodium succinate, 50 mg, q6h  [Held by provider] levothyroxine, 25 mcg, Daily before breakfast  meropenem, 1 g, q12h  oxygen, , Continuous - Inhalation  pantoprazole, 40 mg, Daily before breakfast   Or  pantoprazole, 40 mg, Daily before breakfast  [Held by provider] polyethylene glycol, 17 g, Daily  [Held by provider] sennosides-docusate sodium, 2 tablet, BID      EPINEPHrine, Last Rate: 0.1 mcg/kg/min (11/12/24 1200)  epoprostenol, Last Rate: 0.05 mcg/kg/min (11/12/24 0459)  fentaNYL, Last Rate: 100 mcg/hr (11/12/24 1300)  [Held by provider] heparin, Last Rate: 1,300 Units/hr (11/11/24 1600)  insulin regular  norepinephrine, Last Rate: Stopped (11/12/24 0515)  propofol, Last Rate: 50 mcg/kg/min (11/12/24 1300)  vasopressin, Last Rate: Stopped (11/11/24 1945)      calcium gluconate, 1 g, q6h PRN  calcium gluconate, 2 g, q6h PRN  dextrose 10 % in water (D10W), 20 mL/hr, PRN  dextrose, 10-50 mL, q15 min PRN  glucagon HCL, 1 mg,  q15 min PRN  HYDROmorphone, 0.2 mg, q15 min PRN  magnesium sulfate, 2 g, q6h PRN  magnesium sulfate, 4 g, q6h PRN  naloxone, 0.2 mg, q5 min PRN  potassium chloride, 20 mEq, q6h PRN  potassium chloride, 40 mEq, q6h PRN  vancomycin, , Daily PRN        Vitals:    11/12/24 1300   BP:    Pulse: 100   Resp:    Temp: 35.9 °C (96.6 °F)   SpO2: 95%        11/10 1900 - 11/12 0659  In: 7222.3 [I.V.:4936.5]  Out: 1680 [Urine:1650]   Weight change:      General appearance: AAOx3. No distress  Eyes: non-icteric  Skin: no apparent rash  Heart: [id number]. no rub  Lungs: CTA bilat.  Mild crackles  Abdomen: soft, nt/nd  Extremities: trace edema bilat, mild cyanosis  :  Baugh  Neuro: sedated  ACCESS: L-fem       ASSESSMENT:  Carl CONTRERAS Vazquez is a 59 y.o. male with PMH relevant for CABGx4 2007 & Redo CABG x1 & AVRt, NSTEMI 2020 w/ PCI to RCA d/t graft failure, Recurrent pleural effusion s/p R pleurodesis, HL w/ chemo/XRT (remote), HTN, T2DM, VTE who presented to [facility] ED for dyspnea, diagnosed w/ PE and acutely decompensated 11/10 req pVA ECMO cannulation on 11/10.   Nephrology consulted for CHRIS    #CHRIS-hemodynamic instability, cardiogenic shock.  -Baseline CR 1.0-1.2  -UOP-1450 ml from yesterday, nonoliguric  -electrolytes acceptable  -Acid base: NAGMA  -Hemodynamically unstable.On  Epi 0.1mcg/kg/min    Recommendations:  - No urgent indication for RRT but will eval daily.  - Avoid nephrotoxins, contrast if possible  - strict Is/Os  - Renal dosing for medications for latest eGFR, follow medication trough as appropriate  - Avoid hypotension/rapid fluctuations in BPs  -Will follow along    Elaine Franklin MD  Nephrology Fellow  24 hour Renal Pager - [phone]    Discussed with attending nephrologist

## 2024-11-13 LAB
BACTERIA SPEC RESP CULT: NORMAL
GRAM STN SPEC: NORMAL
GRAM STN SPEC: NORMAL

## 2024-11-13 NOTE — SIGNIFICANT EVENT
Death Within 24 Hours of Soft Wrist Restraint Utilization    Death within 24 hours of soft wrist restraint logged at 11/13/2024 at 12:39 PM.    Lupe Montero RN  Date: 11/13/2024  Time: 12:39 PM

## 2024-11-19 LAB
LABORATORY COMMENT REPORT: NORMAL
PATH REPORT.FINAL DX SPEC: NORMAL
PATH REPORT.GROSS SPEC: NORMAL
PATH REPORT.RELEVANT HX SPEC: NORMAL
PATH REPORT.TOTAL CANCER: NORMAL

## 2024-11-19 NOTE — DOCUMENTATION CLARIFICATION NOTE
"    PATIENT:               VALERIE ROTHMAN  ACCT #:                  [id number]  MRN:                       [id number]  :                       1965  ADMIT DATE:       2024 4:08 PM  DISCH DATE:        11/10/2024 10:00 PM  RESPONDING PROVIDER #:        [id number]          PROVIDER RESPONSE TEXT:    Hypovolemic shock    CDI QUERY TEXT:    Clarification    Instruction:    Based on your assessment of the patient and the clinical information, please provide the requested documentation by clicking on the appropriate radio button and enter any additional information if prompted.    Question: Is there a diagnosis associated with the clinical information    When answering this query, please exercise your independent professional judgment. The fact that a question is being asked, does not imply that any particular answer is desired or expected.    The patient's clinical indicators include:  Clinical Information: 59y.o. M presents to ED with Pneumonia on home abx Doxycycline and Z-latha. Per H&P, admitted with Acute pulm embolism w/right heart strain, multifocal R-sided Pneumonia, NSTEMI, Acute Cholecystitis, Incidental Colitis, and moderate protein-calorie malnutrition.     H&P: \" This man with a sense of gurgling and coughed up a small amount of blood but this got him very anxious agitated tachypneic and tachycardic. \"    11/10 H&P: \" Patient then developed severe respiratory distress around 2 PM.  And transferred to the ICU.  Patient's initial ABG showed lactic acidosis with a lactate of 9.5. \" Toxic Metabolic Encephalopathy, Acute hypoxic respiratory failure, Undifferentiated cardiogenic shock vs Sepsis vs obstructive    11/10 Discharge Summary: Undifferentiate Shock with biventricular failure. Shock. CHRIS anuric. Respiratory Failure    Clinical Indicators:  VS: 36.8, 90, 18, 101/61 Map 74, 95% on 2L NC   Resp culture: Gram positive cocci, pairs, and chains    -11/10 WBCs: 17.2, 17.5, 15.0, 31.3, 44.3   Cr " 1.41, GFR 57, BUN 30, Na 133, Bicarb 33, BNP 1,095    Treatment:  Levophed drip on max dosage 20mcg  Epinephrine maxed dose  Vasostrict IV drip  Bicarb x3  NS boluses  Mechanical ventilation & ECMO    Zosyn  Azithromycin  Vancomycin  Meropenem    Risk Factors: Failed outpatient treatment for PNA, PMHx: Aortic valve disorder, Chronic systolic CHF, HTN, DM2, BMI: 22.26, A&O x3  Options provided:  -- Cardiogenic shock  -- Sepsis with Septic shock  -- Distributive shock 2/2 Acute PE  -- Hypovolemic shock  -- Other - I will add my own diagnosis  -- Refer to Clinical Documentation Reviewer    Query created by: Nay Walls on 11/16/2024 4:12 PM      Electronically signed by:  SARA GREEN MD 11/18/2024 7:49 PM

## 2024-11-26 ENCOUNTER — TELEPHONE (OUTPATIENT)
Dept: CARDIOVASCULAR ICU | Facility: HOSPITAL | Age: 59
End: 2024-11-26

## 2024-12-11 ENCOUNTER — APPOINTMENT (OUTPATIENT)
Dept: PULMONOLOGY | Facility: CLINIC | Age: 59
End: 2024-12-11
Payer: COMMERCIAL

## 2024-12-16 ENCOUNTER — APPOINTMENT (OUTPATIENT)
Dept: CARDIOLOGY | Facility: CLINIC | Age: 59
End: 2024-12-16
Payer: MEDICARE

## 2025-01-21 ENCOUNTER — APPOINTMENT (OUTPATIENT)
Dept: CARDIOLOGY | Facility: CLINIC | Age: 60
End: 2025-01-21
Payer: COMMERCIAL

## 2025-01-27 ENCOUNTER — APPOINTMENT (OUTPATIENT)
Dept: CARDIOLOGY | Facility: CLINIC | Age: 60
End: 2025-01-27
Payer: COMMERCIAL